# Patient Record
Sex: MALE | Race: WHITE | NOT HISPANIC OR LATINO | URBAN - METROPOLITAN AREA
[De-identification: names, ages, dates, MRNs, and addresses within clinical notes are randomized per-mention and may not be internally consistent; named-entity substitution may affect disease eponyms.]

---

## 2018-07-06 ENCOUNTER — HOSPITAL ENCOUNTER (OUTPATIENT)
Dept: NON INVASIVE DIAGNOSTICS | Facility: HOSPITAL | Age: 83
Discharge: HOME | End: 2018-07-06
Attending: INTERNAL MEDICINE | Admitting: INTERNAL MEDICINE
Payer: MEDICARE

## 2018-07-06 VITALS
RESPIRATION RATE: 18 BRPM | HEART RATE: 61 BPM | DIASTOLIC BLOOD PRESSURE: 64 MMHG | WEIGHT: 270 LBS | SYSTOLIC BLOOD PRESSURE: 137 MMHG | HEIGHT: 72 IN | BODY MASS INDEX: 36.57 KG/M2 | TEMPERATURE: 98.4 F | OXYGEN SATURATION: 95 %

## 2018-07-06 DIAGNOSIS — I42.9 CARDIOMYOPATHY (HCC): ICD-10-CM

## 2018-07-06 PROCEDURE — 33284 HB REMOVE PAT-ACTIVE HT RECORD: CPT | Performed by: INTERNAL MEDICINE

## 2018-07-06 PROCEDURE — 33284 PR RMVL IMPLANTABLE PT-ACTIVATED CAR EVENT RECORDER: CPT | Performed by: INTERNAL MEDICINE

## 2018-07-06 RX ORDER — LIDOCAINE HYDROCHLORIDE 10 MG/ML
INJECTION, SOLUTION INFILTRATION; PERINEURAL CODE/TRAUMA/SEDATION MEDICATION
Status: COMPLETED | OUTPATIENT
Start: 2018-07-06 | End: 2018-07-06

## 2018-07-06 RX ORDER — ASPIRIN AND DIPYRIDAMOLE 25; 200 MG/1; MG/1
1 CAPSULE, EXTENDED RELEASE ORAL 2 TIMES DAILY
COMMUNITY
Start: 2017-12-19

## 2018-07-06 RX ORDER — RAMIPRIL 10 MG/1
5 CAPSULE ORAL DAILY
COMMUNITY
Start: 2011-03-21

## 2018-07-06 RX ORDER — HYDROCHLOROTHIAZIDE 12.5 MG/1
CAPSULE, GELATIN COATED ORAL DAILY
COMMUNITY
Start: 2011-06-01

## 2018-07-06 RX ORDER — SIMVASTATIN 40 MG
40 TABLET ORAL
COMMUNITY
Start: 2011-04-19

## 2018-07-06 RX ORDER — METOPROLOL SUCCINATE 25 MG/1
25 TABLET, EXTENDED RELEASE ORAL
COMMUNITY
Start: 2011-01-24

## 2018-07-06 RX ADMIN — LIDOCAINE HYDROCHLORIDE 10 ML: 10 INJECTION, SOLUTION INFILTRATION; PERINEURAL at 13:21

## 2018-07-06 NOTE — H&P
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% per echo 8/2017  4  Hypertension  5  Hyperlipidemia  6  ZOFIA    Plan:  1  Loop recorder explantation      History of Present Illness   HPI:  Ehsan Mccord is a 80y o  year old male with a history of CVA with loop recorder implantation 2015, CAD with prior MI and LAD stent, ICM with LVEF 40%, HTN, and HLD  He typically follows with Dr Casey Barnett as an outpatient, and presents today for loop recorder explantation  Per reports, his device reached RRT 4/2018  Review of Systems  ROS as noted above, otherwise 12 point review of systems was performed and is negative  Historical Information   No past medical history on file  No past surgical history on file  Family History: No family history on file  Social History   History   Alcohol use Not on file     History   Drug use: Unknown     History   Smoking Status    Not on file   Smokeless Tobacco    Not on file         Meds/Allergies   all medications and allergies reviewed  Home Medications:   Prescriptions Prior to Admission   Medication    aspirin-dipyridamole (AGGRENOX)  mg per 12 hr capsule    folic acid-pyridoxine-cyanocobalamin (FOLBIC) 2 5-25-2 mg    hydrochlorothiazide (MICROZIDE) 12 5 mg capsule    metoprolol succinate (TOPROL-XL) 25 mg 24 hr tablet    ramipril (ALTACE) 10 MG capsule    sertraline (ZOLOFT) 50 mg tablet    simvastatin (ZOCOR) 40 mg tablet       No Known Allergies    Objective   Vitals: Blood pressure 158/66, pulse 68, temperature 97 5 °F (36 4 °C), temperature source Oral, resp  rate 18, height 6' (1 829 m), weight 122 kg (270 lb), SpO2 94 %    Orthostatic Blood Pressures      Most Recent Value   Blood Pressure  158/66 filed at 07/06/2018 1202   Patient Position - Orthostatic VS  Sitting filed at 07/06/2018 1202          No intake or output data in the 24 hours ending 07/06/18 1318    Invasive Devices          No matching active lines, drains, or airways          Physical Exam  GEN: NAD, alert and oriented, well appearing  SKIN: dry without significant lesions or rashes  HEENT: NCAT, PERRL, EOMs intact  NECK: No JVD appreciated  CARDIOVASCULAR: RRR with occasional ectopy, normal S1, S2 without rubs or gallops appreciated; soft MICAH  LUNGS: Clear to auscultation bilaterally without wheezes, rhonchi, or rales  ABDOMEN: Soft, nontender, nondistended  EXTREMITIES/VASCULAR: perfused without clubbing, cyanosis, or edema b/l  PSYCH: Normal mood and affect  NEURO: CN ll-Xll grossly intact      Lab Results: I have personally reviewed pertinent lab results  None noted              Imaging: I have personally reviewed pertinent reports  No results found for this or any previous visit      EKG/telemetry: NSR with PVCs        Code Status: No Order

## 2018-07-06 NOTE — DISCHARGE INSTRUCTIONS
Remove outermost pressure dressing tomorrow, 7/7/2018  Keep loop recorder incision dry for one week  Do not use lotions/powders/creams on incision  Remove remaining bandage 48 hours after procedure - if present, leave underlying steri-strips in place, they will either fall off on their own or will be removed at 2 week follow up appointment  Please call the office if you notice redness, swelling, bleeding, or drainage from incision or if you develop fevers

## 2018-10-01 ENCOUNTER — APPOINTMENT (OUTPATIENT)
Dept: LAB | Facility: HOSPITAL | Age: 83
End: 2018-10-01
Payer: MEDICARE

## 2018-10-01 ENCOUNTER — TRANSCRIBE ORDERS (OUTPATIENT)
Dept: ADMINISTRATIVE | Facility: HOSPITAL | Age: 83
End: 2018-10-01

## 2018-10-01 DIAGNOSIS — E66.9 OBESITY, UNSPECIFIED CLASSIFICATION, UNSPECIFIED OBESITY TYPE, UNSPECIFIED WHETHER SERIOUS COMORBIDITY PRESENT: ICD-10-CM

## 2018-10-01 DIAGNOSIS — E78.5 HYPERLIPIDEMIA, UNSPECIFIED HYPERLIPIDEMIA TYPE: ICD-10-CM

## 2018-10-01 DIAGNOSIS — I25.10 CAD IN NATIVE ARTERY: ICD-10-CM

## 2018-10-01 DIAGNOSIS — I10 ESSENTIAL HYPERTENSION, MALIGNANT: ICD-10-CM

## 2018-10-01 DIAGNOSIS — I25.10 CAD IN NATIVE ARTERY: Primary | ICD-10-CM

## 2018-10-01 LAB
25(OH)D3 SERPL-MCNC: 25.9 NG/ML (ref 30–100)
ALBUMIN SERPL BCP-MCNC: 4.1 G/DL (ref 3.5–5.7)
ALP SERPL-CCNC: 61 U/L (ref 55–165)
ALT SERPL W P-5'-P-CCNC: 15 U/L (ref 7–52)
ANION GAP SERPL CALCULATED.3IONS-SCNC: 8 MMOL/L (ref 4–13)
AST SERPL W P-5'-P-CCNC: 15 U/L (ref 13–39)
BASOPHILS # BLD AUTO: 0 THOUSANDS/ΜL (ref 0–0.1)
BASOPHILS NFR BLD AUTO: 1 % (ref 0–1)
BILIRUB SERPL-MCNC: 0.8 MG/DL (ref 0.2–1)
BNP SERPL-MCNC: 262 PG/ML (ref 1–100)
BUN SERPL-MCNC: 14 MG/DL (ref 7–25)
CALCIUM SERPL-MCNC: 9.5 MG/DL (ref 8.6–10.5)
CHLORIDE SERPL-SCNC: 101 MMOL/L (ref 98–107)
CHOLEST SERPL-MCNC: 139 MG/DL (ref 0–200)
CK SERPL-CCNC: 61 U/L (ref 30–308)
CO2 SERPL-SCNC: 33 MMOL/L (ref 21–31)
CREAT SERPL-MCNC: 1.04 MG/DL (ref 0.7–1.3)
EOSINOPHIL # BLD AUTO: 0.3 THOUSAND/ΜL (ref 0–0.61)
EOSINOPHIL NFR BLD AUTO: 5 % (ref 0–6)
ERYTHROCYTE [DISTWIDTH] IN BLOOD BY AUTOMATED COUNT: 13.7 % (ref 11.6–15.1)
ERYTHROCYTE [SEDIMENTATION RATE] IN BLOOD: 17 MM/HOUR (ref 0–10)
EST. AVERAGE GLUCOSE BLD GHB EST-MCNC: 128 MG/DL
GFR SERPL CREATININE-BSD FRML MDRD: 66 ML/MIN/1.73SQ M
GLUCOSE P FAST SERPL-MCNC: 118 MG/DL (ref 65–99)
HBA1C MFR BLD: 6.1 % (ref 4.2–6.3)
HCT VFR BLD AUTO: 44 % (ref 42–52)
HDLC SERPL-MCNC: 45 MG/DL (ref 40–60)
HGB BLD-MCNC: 14.6 G/DL (ref 12–17)
LDLC SERPL CALC-MCNC: 65 MG/DL (ref 0–100)
LDLC SERPL DIRECT ASSAY-MCNC: 80 MG/DL (ref 75–193)
LYMPHOCYTES # BLD AUTO: 1.5 THOUSANDS/ΜL (ref 0.6–4.47)
LYMPHOCYTES NFR BLD AUTO: 22 % (ref 14–44)
MAGNESIUM SERPL-MCNC: 1.8 MG/DL (ref 1.9–2.7)
MCH RBC QN AUTO: 30.6 PG (ref 26.8–34.3)
MCHC RBC AUTO-ENTMCNC: 33.1 G/DL (ref 31.4–37.4)
MCV RBC AUTO: 93 FL (ref 82–98)
MONOCYTES # BLD AUTO: 0.4 THOUSAND/ΜL (ref 0.17–1.22)
MONOCYTES NFR BLD AUTO: 7 % (ref 4–12)
NEUTROPHILS # BLD AUTO: 4.5 THOUSANDS/ΜL (ref 1.85–7.62)
NEUTS SEG NFR BLD AUTO: 66 % (ref 43–75)
NONHDLC SERPL-MCNC: 94 MG/DL
NRBC BLD AUTO-RTO: 0 /100 WBCS
PLATELET # BLD AUTO: 248 THOUSANDS/UL (ref 149–390)
PMV BLD AUTO: 7.3 FL (ref 8.9–12.7)
POTASSIUM SERPL-SCNC: 4.1 MMOL/L (ref 3.5–5.5)
PROT SERPL-MCNC: 7.3 G/DL (ref 6.4–8.9)
PSA SERPL-MCNC: 1.5 NG/ML (ref 0–4)
RBC # BLD AUTO: 4.76 MILLION/UL (ref 3.88–5.62)
SODIUM SERPL-SCNC: 142 MMOL/L (ref 134–143)
TRIGL SERPL-MCNC: 143 MG/DL (ref 44–166)
TSH SERPL DL<=0.05 MIU/L-ACNC: 0.93 UIU/ML (ref 0.45–5.33)
WBC # BLD AUTO: 6.8 THOUSAND/UL (ref 4.31–10.16)

## 2018-10-01 PROCEDURE — 80061 LIPID PANEL: CPT

## 2018-10-01 PROCEDURE — 82550 ASSAY OF CK (CPK): CPT

## 2018-10-01 PROCEDURE — 36415 COLL VENOUS BLD VENIPUNCTURE: CPT

## 2018-10-01 PROCEDURE — 85652 RBC SED RATE AUTOMATED: CPT

## 2018-10-01 PROCEDURE — 83735 ASSAY OF MAGNESIUM: CPT

## 2018-10-01 PROCEDURE — 84443 ASSAY THYROID STIM HORMONE: CPT

## 2018-10-01 PROCEDURE — 82306 VITAMIN D 25 HYDROXY: CPT

## 2018-10-01 PROCEDURE — 83036 HEMOGLOBIN GLYCOSYLATED A1C: CPT

## 2018-10-01 PROCEDURE — 84153 ASSAY OF PSA TOTAL: CPT

## 2018-10-01 PROCEDURE — 80053 COMPREHEN METABOLIC PANEL: CPT

## 2018-10-01 PROCEDURE — 85025 COMPLETE CBC W/AUTO DIFF WBC: CPT

## 2018-10-01 PROCEDURE — 83880 ASSAY OF NATRIURETIC PEPTIDE: CPT

## 2018-10-01 PROCEDURE — 83721 ASSAY OF BLOOD LIPOPROTEIN: CPT

## 2022-10-13 ENCOUNTER — APPOINTMENT (OUTPATIENT)
Dept: RADIOLOGY | Facility: HOSPITAL | Age: 87
DRG: 291 | End: 2022-10-13
Payer: MEDICARE

## 2022-10-13 ENCOUNTER — HOSPITAL ENCOUNTER (INPATIENT)
Facility: HOSPITAL | Age: 87
LOS: 4 days | Discharge: HOME HEALTH | DRG: 291 | End: 2022-10-17
Attending: EMERGENCY MEDICINE | Admitting: INTERNAL MEDICINE
Payer: MEDICARE

## 2022-10-13 DIAGNOSIS — I50.9 ACUTE EXACERBATION OF CHF (CONGESTIVE HEART FAILURE) (HCC): Primary | ICD-10-CM

## 2022-10-13 DIAGNOSIS — I50.21 ACUTE SYSTOLIC CONGESTIVE HEART FAILURE (HCC): ICD-10-CM

## 2022-10-13 DIAGNOSIS — I48.91 ATRIAL FIBRILLATION WITH RVR (HCC): ICD-10-CM

## 2022-10-13 DIAGNOSIS — I48.91 ATRIAL FIBRILLATION WITH RAPID VENTRICULAR RESPONSE (HCC): ICD-10-CM

## 2022-10-13 PROBLEM — I25.10 CAD (CORONARY ARTERY DISEASE), NATIVE CORONARY ARTERY: Status: ACTIVE | Noted: 2022-10-13

## 2022-10-13 PROBLEM — I10 BENIGN ESSENTIAL HTN: Status: ACTIVE | Noted: 2022-10-13

## 2022-10-13 PROBLEM — J96.11 CHRONIC RESPIRATORY FAILURE WITH HYPOXIA (HCC): Status: ACTIVE | Noted: 2022-10-13

## 2022-10-13 PROBLEM — Z86.73 HISTORY OF CVA (CEREBROVASCULAR ACCIDENT): Status: ACTIVE | Noted: 2022-10-13

## 2022-10-13 PROBLEM — R44.3 HALLUCINATIONS: Status: ACTIVE | Noted: 2022-10-13

## 2022-10-13 LAB
2HR DELTA HS TROPONIN: 3 NG/L
4HR DELTA HS TROPONIN: 0 NG/L
ALBUMIN SERPL BCP-MCNC: 3.7 G/DL (ref 3.5–5)
ALP SERPL-CCNC: 67 U/L (ref 34–104)
ALT SERPL W P-5'-P-CCNC: 136 U/L (ref 7–52)
ANION GAP SERPL CALCULATED.3IONS-SCNC: 11 MMOL/L (ref 4–13)
APTT PPP: 29 SECONDS (ref 23–37)
AST SERPL W P-5'-P-CCNC: 97 U/L (ref 13–39)
ATRIAL RATE: 144 BPM
BACTERIA UR QL AUTO: ABNORMAL /HPF
BASOPHILS # BLD AUTO: 0.04 THOUSANDS/ÂΜL (ref 0–0.1)
BASOPHILS NFR BLD AUTO: 1 % (ref 0–1)
BILIRUB SERPL-MCNC: 1.57 MG/DL (ref 0.2–1)
BILIRUB UR QL STRIP: NEGATIVE
BNP SERPL-MCNC: 1272 PG/ML (ref 0–100)
BUN SERPL-MCNC: 28 MG/DL (ref 5–25)
CALCIUM SERPL-MCNC: 9.3 MG/DL (ref 8.4–10.2)
CARDIAC TROPONIN I PNL SERPL HS: 24 NG/L
CARDIAC TROPONIN I PNL SERPL HS: 24 NG/L
CARDIAC TROPONIN I PNL SERPL HS: 27 NG/L
CHLORIDE SERPL-SCNC: 105 MMOL/L (ref 96–108)
CLARITY UR: CLEAR
CO2 SERPL-SCNC: 28 MMOL/L (ref 21–32)
COLOR UR: YELLOW
CREAT SERPL-MCNC: 1.29 MG/DL (ref 0.6–1.3)
EOSINOPHIL # BLD AUTO: 0.05 THOUSAND/ÂΜL (ref 0–0.61)
EOSINOPHIL NFR BLD AUTO: 1 % (ref 0–6)
ERYTHROCYTE [DISTWIDTH] IN BLOOD BY AUTOMATED COUNT: 14.8 % (ref 11.6–15.1)
FLUAV RNA RESP QL NAA+PROBE: NEGATIVE
FLUBV RNA RESP QL NAA+PROBE: NEGATIVE
GFR SERPL CREATININE-BSD FRML MDRD: 49 ML/MIN/1.73SQ M
GLUCOSE SERPL-MCNC: 137 MG/DL (ref 65–140)
GLUCOSE UR STRIP-MCNC: NEGATIVE MG/DL
HCT VFR BLD AUTO: 48.8 % (ref 36.5–49.3)
HGB BLD-MCNC: 15.2 G/DL (ref 12–17)
HGB UR QL STRIP.AUTO: NEGATIVE
HYALINE CASTS #/AREA URNS LPF: ABNORMAL /LPF
IMM GRANULOCYTES # BLD AUTO: 0.02 THOUSAND/UL (ref 0–0.2)
IMM GRANULOCYTES NFR BLD AUTO: 0 % (ref 0–2)
INR PPP: 1.32 (ref 0.84–1.19)
KETONES UR STRIP-MCNC: NEGATIVE MG/DL
LACTATE SERPL-SCNC: 0.9 MMOL/L (ref 0.5–2)
LACTATE SERPL-SCNC: 2.4 MMOL/L (ref 0.5–2)
LEUKOCYTE ESTERASE UR QL STRIP: ABNORMAL
LYMPHOCYTES # BLD AUTO: 1.06 THOUSANDS/ÂΜL (ref 0.6–4.47)
LYMPHOCYTES NFR BLD AUTO: 12 % (ref 14–44)
MAGNESIUM SERPL-MCNC: 1.8 MG/DL (ref 1.9–2.7)
MCH RBC QN AUTO: 31.1 PG (ref 26.8–34.3)
MCHC RBC AUTO-ENTMCNC: 31.1 G/DL (ref 31.4–37.4)
MCV RBC AUTO: 100 FL (ref 82–98)
MONOCYTES # BLD AUTO: 0.7 THOUSAND/ÂΜL (ref 0.17–1.22)
MONOCYTES NFR BLD AUTO: 8 % (ref 4–12)
MUCOUS THREADS UR QL AUTO: ABNORMAL
NEUTROPHILS # BLD AUTO: 6.91 THOUSANDS/ÂΜL (ref 1.85–7.62)
NEUTS SEG NFR BLD AUTO: 78 % (ref 43–75)
NITRITE UR QL STRIP: NEGATIVE
NON-SQ EPI CELLS URNS QL MICRO: ABNORMAL /HPF
NRBC BLD AUTO-RTO: 0 /100 WBCS
PH UR STRIP.AUTO: 5.5 [PH]
PLATELET # BLD AUTO: 235 THOUSANDS/UL (ref 149–390)
PMV BLD AUTO: 9.9 FL (ref 8.9–12.7)
POTASSIUM SERPL-SCNC: 3.9 MMOL/L (ref 3.5–5.3)
PROCALCITONIN SERPL-MCNC: <0.05 NG/ML
PROT SERPL-MCNC: 6.5 G/DL (ref 6.4–8.4)
PROT UR STRIP-MCNC: ABNORMAL MG/DL
PROTHROMBIN TIME: 16.3 SECONDS (ref 11.6–14.5)
QRS AXIS: -69 DEGREES
QRSD INTERVAL: 150 MS
QT INTERVAL: 396 MS
QTC INTERVAL: 582 MS
RBC # BLD AUTO: 4.89 MILLION/UL (ref 3.88–5.62)
RBC #/AREA URNS AUTO: ABNORMAL /HPF
RSV RNA RESP QL NAA+PROBE: NEGATIVE
SARS-COV-2 RNA RESP QL NAA+PROBE: NEGATIVE
SODIUM SERPL-SCNC: 144 MMOL/L (ref 135–147)
SP GR UR STRIP.AUTO: 1.02 (ref 1–1.03)
T WAVE AXIS: 115 DEGREES
UROBILINOGEN UR QL STRIP.AUTO: 0.2 E.U./DL
VENTRICULAR RATE: 130 BPM
WBC # BLD AUTO: 8.78 THOUSAND/UL (ref 4.31–10.16)
WBC #/AREA URNS AUTO: ABNORMAL /HPF

## 2022-10-13 PROCEDURE — 85610 PROTHROMBIN TIME: CPT | Performed by: EMERGENCY MEDICINE

## 2022-10-13 PROCEDURE — 0241U HB NFCT DS VIR RESP RNA 4 TRGT: CPT | Performed by: EMERGENCY MEDICINE

## 2022-10-13 PROCEDURE — 93010 ELECTROCARDIOGRAM REPORT: CPT | Performed by: INTERNAL MEDICINE

## 2022-10-13 PROCEDURE — 81001 URINALYSIS AUTO W/SCOPE: CPT | Performed by: EMERGENCY MEDICINE

## 2022-10-13 PROCEDURE — 84300 ASSAY OF URINE SODIUM: CPT | Performed by: NURSE PRACTITIONER

## 2022-10-13 PROCEDURE — 83880 ASSAY OF NATRIURETIC PEPTIDE: CPT | Performed by: EMERGENCY MEDICINE

## 2022-10-13 PROCEDURE — 87154 CUL TYP ID BLD PTHGN 6+ TRGT: CPT | Performed by: EMERGENCY MEDICINE

## 2022-10-13 PROCEDURE — 99223 1ST HOSP IP/OBS HIGH 75: CPT | Performed by: INTERNAL MEDICINE

## 2022-10-13 PROCEDURE — 83735 ASSAY OF MAGNESIUM: CPT | Performed by: EMERGENCY MEDICINE

## 2022-10-13 PROCEDURE — 93005 ELECTROCARDIOGRAM TRACING: CPT

## 2022-10-13 PROCEDURE — 83605 ASSAY OF LACTIC ACID: CPT | Performed by: EMERGENCY MEDICINE

## 2022-10-13 PROCEDURE — 85025 COMPLETE CBC W/AUTO DIFF WBC: CPT | Performed by: EMERGENCY MEDICINE

## 2022-10-13 PROCEDURE — 71045 X-RAY EXAM CHEST 1 VIEW: CPT

## 2022-10-13 PROCEDURE — 96374 THER/PROPH/DIAG INJ IV PUSH: CPT

## 2022-10-13 PROCEDURE — 80053 COMPREHEN METABOLIC PANEL: CPT | Performed by: EMERGENCY MEDICINE

## 2022-10-13 PROCEDURE — 82570 ASSAY OF URINE CREATININE: CPT | Performed by: NURSE PRACTITIONER

## 2022-10-13 PROCEDURE — 99285 EMERGENCY DEPT VISIT HI MDM: CPT

## 2022-10-13 PROCEDURE — 84540 ASSAY OF URINE/UREA-N: CPT | Performed by: NURSE PRACTITIONER

## 2022-10-13 PROCEDURE — 96375 TX/PRO/DX INJ NEW DRUG ADDON: CPT

## 2022-10-13 PROCEDURE — 87040 BLOOD CULTURE FOR BACTERIA: CPT | Performed by: EMERGENCY MEDICINE

## 2022-10-13 PROCEDURE — 84484 ASSAY OF TROPONIN QUANT: CPT | Performed by: EMERGENCY MEDICINE

## 2022-10-13 PROCEDURE — 85730 THROMBOPLASTIN TIME PARTIAL: CPT | Performed by: EMERGENCY MEDICINE

## 2022-10-13 PROCEDURE — 36415 COLL VENOUS BLD VENIPUNCTURE: CPT | Performed by: EMERGENCY MEDICINE

## 2022-10-13 PROCEDURE — 84145 PROCALCITONIN (PCT): CPT | Performed by: EMERGENCY MEDICINE

## 2022-10-13 PROCEDURE — 99291 CRITICAL CARE FIRST HOUR: CPT | Performed by: EMERGENCY MEDICINE

## 2022-10-13 RX ORDER — PRAVASTATIN SODIUM 40 MG
80 TABLET ORAL
Status: DISCONTINUED | OUTPATIENT
Start: 2022-10-13 | End: 2022-10-17 | Stop reason: HOSPADM

## 2022-10-13 RX ORDER — DILTIAZEM HYDROCHLORIDE 5 MG/ML
15 INJECTION INTRAVENOUS ONCE
Status: COMPLETED | OUTPATIENT
Start: 2022-10-13 | End: 2022-10-13

## 2022-10-13 RX ORDER — QUETIAPINE FUMARATE 25 MG/1
50 TABLET, FILM COATED ORAL
Status: DISCONTINUED | OUTPATIENT
Start: 2022-10-13 | End: 2022-10-17 | Stop reason: HOSPADM

## 2022-10-13 RX ORDER — ONDANSETRON 2 MG/ML
4 INJECTION INTRAMUSCULAR; INTRAVENOUS EVERY 6 HOURS PRN
Status: DISCONTINUED | OUTPATIENT
Start: 2022-10-13 | End: 2022-10-17 | Stop reason: HOSPADM

## 2022-10-13 RX ORDER — METOPROLOL TARTRATE 5 MG/5ML
5 INJECTION INTRAVENOUS
Status: DISCONTINUED | OUTPATIENT
Start: 2022-10-13 | End: 2022-10-13

## 2022-10-13 RX ORDER — METOPROLOL SUCCINATE 50 MG/1
100 TABLET, EXTENDED RELEASE ORAL DAILY
Status: DISCONTINUED | OUTPATIENT
Start: 2022-10-13 | End: 2022-10-15

## 2022-10-13 RX ORDER — FOLIC ACID 1 MG/1
1 TABLET ORAL DAILY
COMMUNITY

## 2022-10-13 RX ORDER — QUETIAPINE FUMARATE 25 MG/1
50 TABLET, FILM COATED ORAL
COMMUNITY
Start: 2022-08-23

## 2022-10-13 RX ORDER — FUROSEMIDE 10 MG/ML
40 INJECTION INTRAMUSCULAR; INTRAVENOUS ONCE
Status: COMPLETED | OUTPATIENT
Start: 2022-10-13 | End: 2022-10-13

## 2022-10-13 RX ORDER — PANTOPRAZOLE SODIUM 40 MG/1
40 TABLET, DELAYED RELEASE ORAL
Status: DISCONTINUED | OUTPATIENT
Start: 2022-10-14 | End: 2022-10-17 | Stop reason: HOSPADM

## 2022-10-13 RX ORDER — LISINOPRIL 20 MG/1
40 TABLET ORAL DAILY
Status: DISCONTINUED | OUTPATIENT
Start: 2022-10-13 | End: 2022-10-14

## 2022-10-13 RX ORDER — METOPROLOL TARTRATE 5 MG/5ML
5 INJECTION INTRAVENOUS ONCE
Status: COMPLETED | OUTPATIENT
Start: 2022-10-13 | End: 2022-10-13

## 2022-10-13 RX ORDER — FUROSEMIDE 10 MG/ML
40 INJECTION INTRAMUSCULAR; INTRAVENOUS 2 TIMES DAILY
Status: DISCONTINUED | OUTPATIENT
Start: 2022-10-13 | End: 2022-10-15

## 2022-10-13 RX ORDER — PANTOPRAZOLE SODIUM 40 MG/1
40 TABLET, DELAYED RELEASE ORAL DAILY
COMMUNITY

## 2022-10-13 RX ORDER — ACETAMINOPHEN 325 MG/1
650 TABLET ORAL EVERY 4 HOURS PRN
Status: DISCONTINUED | OUTPATIENT
Start: 2022-10-13 | End: 2022-10-17 | Stop reason: HOSPADM

## 2022-10-13 RX ORDER — ASPIRIN AND DIPYRIDAMOLE 25; 200 MG/1; MG/1
1 CAPSULE, EXTENDED RELEASE ORAL 2 TIMES DAILY
Status: DISCONTINUED | OUTPATIENT
Start: 2022-10-13 | End: 2022-10-14

## 2022-10-13 RX ORDER — METOPROLOL TARTRATE 5 MG/5ML
5 INJECTION INTRAVENOUS
Status: DISCONTINUED | OUTPATIENT
Start: 2022-10-13 | End: 2022-10-14

## 2022-10-13 RX ADMIN — LISINOPRIL 40 MG: 20 TABLET ORAL at 14:41

## 2022-10-13 RX ADMIN — PRAVASTATIN SODIUM 80 MG: 40 TABLET ORAL at 16:45

## 2022-10-13 RX ADMIN — ASPIRIN AND EXTENDED-RELEASE DIPYRIDAMOLE 1 CAPSULE: 25; 200 CAPSULE ORAL at 18:13

## 2022-10-13 RX ADMIN — FUROSEMIDE 40 MG: 10 INJECTION, SOLUTION INTRAMUSCULAR; INTRAVENOUS at 12:33

## 2022-10-13 RX ADMIN — METOPROLOL SUCCINATE 100 MG: 50 TABLET, EXTENDED RELEASE ORAL at 14:41

## 2022-10-13 RX ADMIN — FUROSEMIDE 40 MG: 10 INJECTION, SOLUTION INTRAMUSCULAR; INTRAVENOUS at 18:14

## 2022-10-13 RX ADMIN — METOPROLOL TARTRATE 5 MG: 1 INJECTION, SOLUTION INTRAVENOUS at 12:06

## 2022-10-13 RX ADMIN — QUETIAPINE FUMARATE 50 MG: 25 TABLET ORAL at 21:18

## 2022-10-13 RX ADMIN — FOLIC ACID-PYRIDOXINE-CYANOCOBALAMIN TAB 2.5-25-2 MG 1 TABLET: 2.5-25-2 TAB at 16:45

## 2022-10-13 RX ADMIN — APIXABAN 5 MG: 5 TABLET, FILM COATED ORAL at 18:13

## 2022-10-13 RX ADMIN — SERTRALINE HYDROCHLORIDE 100 MG: 50 TABLET ORAL at 14:41

## 2022-10-13 RX ADMIN — METOPROLOL TARTRATE 5 MG: 5 INJECTION INTRAVENOUS at 15:37

## 2022-10-13 RX ADMIN — DILTIAZEM HYDROCHLORIDE 15 MG: 5 INJECTION, SOLUTION INTRAVENOUS at 16:09

## 2022-10-13 NOTE — NURSING NOTE
Pt HR sustaining 120's  EKG shows Afib w/ BBB and left anterior fascicular block  Pt denies chest pain, palpations and SOB  PRN IV 5 mg Lopressor administered as ordered with no effect  [address] DO made aware  One time dose 15 mg IV Cardizem ordered and administered

## 2022-10-13 NOTE — ASSESSMENT & PLAN NOTE
· Patient notes that he is on 3L NC QHS chronically  · This was given to him by his cardiologist several years ago who he has not seen in several years as he retired  · He notes that he was using it more frequently (during the day) for symptoms relief but oxygen saturations remained in the 90s

## 2022-10-13 NOTE — ASSESSMENT & PLAN NOTE
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

## 2022-10-13 NOTE — ASSESSMENT & PLAN NOTE
Wt Readings from Last 3 Encounters:   10/13/22 111 kg (245 lb)   07/06/18 122 kg (270 lb)     · History of anterior MI  · RANJITH (2015): LVEF 60%; no more recent echos available for review   · NT=Pro-BNP: 1272  · CXR (10/13):  Moderate pulmonary edema, moderate right and small left pleural effusions  · Substitute Lisinopril for home Ramipril and continue Toprol as above  · Update echocardiogram as above  · Begin Lasix 40mg IV BID  · Monitor Is and Os and daily weights  · Fluid and Na restricted diet

## 2022-10-13 NOTE — ASSESSMENT & PLAN NOTE
· History of PAF with RANJITH and CVN in 2015  · Atrial fibrillation with HR as high as 132 noted in the ED  · [id number]: 6-7   · Check TSH and continue to trend HsTn  · Monitor on telemetry and check Echocardiogram   · Increase home Toprol to 100mg daily  · Lopressor 5mg IV PRN  · Discussed the risks and benefits of [address] and the patient and his son were in agreement  · Begin Eliquis 5mg BID  · Cardiology consult requested

## 2022-10-13 NOTE — ASSESSMENT & PLAN NOTE
· History of visual hallucinations- being followed by Neurology with [facility] outpatient  · Continue home Seroquel 50mg QHS

## 2022-10-13 NOTE — ED PROVIDER NOTES
History  Chief Complaint   Patient presents with   • Fatigue   • Weakness - Generalized   • Shortness of Breath     Patient is an 77-year-old male with history of CAD, mild ischemic cardiomyopathy, hypertension, who presents for evaluation of generalized weakness, shortness of breath  Patient says the symptoms have been progressing over the past week  The patient says that he wears 3 L of oxygen at home and has not had to increase it  He denies any chest pain, lightheadedness, dizziness, nausea, vomiting, abdominal pain  He denies any leg swelling  Denies any cough, fevers or chills  Prior to Admission Medications   Prescriptions Last Dose Informant Patient Reported? Taking? Cholecalciferol 50 MCG (2000 UT) CAPS   Yes No   Sig: Take 1 capsule by mouth 2 (two) times a day   QUEtiapine (SEROquel) 25 mg tablet   Yes Yes   Sig: Take 50 mg by mouth   aspirin-dipyridamole (AGGRENOX)  mg per 12 hr capsule   Yes No   Sig: Take 1 capsule by mouth 2 (two) times a day   folic acid (FOLVITE) 1 mg tablet   Yes No   Sig: Take 1 mg by mouth daily   folic acid-pyridoxine-cyanocobalamin (FOLBIC) 2 5-25-2 mg   Yes No   Sig: Take 1 tablet by mouth daily   hydrochlorothiazide (MICROZIDE) 12 5 mg capsule   Yes No   Sig: Take by mouth daily   metoprolol succinate (TOPROL-XL) 25 mg 24 hr tablet   Yes No   Sig: Take 25 mg by mouth   pantoprazole (PROTONIX) 40 mg tablet   Yes No   Sig: Take 40 mg by mouth daily   ramipril (ALTACE) 10 MG capsule   Yes No   Sig: Take 5 mg by mouth daily   sertraline (ZOLOFT) 50 mg tablet   Yes No   Sig: Take 100 mg by mouth daily   simvastatin (ZOCOR) 40 mg tablet   Yes No   Sig: Take 40 mg by mouth      Facility-Administered Medications: None       No past medical history on file  No past surgical history on file  No family history on file  I have reviewed and agree with the history as documented      E-Cigarette/Vaping     E-Cigarette/Vaping Substances     Social History Tobacco Use   • Smoking status: Never Smoker   • Smokeless tobacco: Never Used   Substance Use Topics   • Alcohol use: Not Currently   • Drug use: Never       Review of Systems   Constitutional: Negative for chills, fever and unexpected weight change  HENT: Negative for congestion, sore throat and trouble swallowing  Eyes: Negative for pain, discharge and itching  Respiratory: Positive for shortness of breath  Negative for cough, chest tightness and wheezing  Cardiovascular: Negative for chest pain, palpitations and leg swelling  Gastrointestinal: Negative for abdominal pain, blood in stool, diarrhea, nausea and vomiting  Endocrine: Negative for polyuria  Genitourinary: Negative for difficulty urinating, dysuria, frequency and hematuria  Musculoskeletal: Negative for arthralgias and back pain  Neurological: Positive for weakness (generalized)  Negative for dizziness, syncope, light-headedness and headaches  Physical Exam  Physical Exam  Vitals and nursing note reviewed  Constitutional:       General: He is not in acute distress  Appearance: He is well-developed  HENT:      Head: Normocephalic and atraumatic  Right Ear: External ear normal       Left Ear: External ear normal    Eyes:      Conjunctiva/sclera: Conjunctivae normal       Pupils: Pupils are equal, round, and reactive to light  Cardiovascular:      Rate and Rhythm: Tachycardia present  Rhythm irregular  Heart sounds: Normal heart sounds  No murmur heard  No friction rub  No gallop  Pulmonary:      Effort: Pulmonary effort is normal  No respiratory distress  Breath sounds: Normal breath sounds  No wheezing or rales  Abdominal:      General: Bowel sounds are normal  There is no distension  Palpations: Abdomen is soft  Tenderness: There is no abdominal tenderness  There is no guarding  Musculoskeletal:         General: No tenderness or deformity  Normal range of motion        Cervical back: Normal range of motion  Lymphadenopathy:      Cervical: No cervical adenopathy  Skin:     General: Skin is warm and dry  Neurological:      General: No focal deficit present  Mental Status: He is alert and oriented to person, place, and time  Mental status is at baseline  Cranial Nerves: No cranial nerve deficit  Sensory: No sensory deficit  Motor: No weakness or abnormal muscle tone     Psychiatric:         Behavior: Behavior normal          Vital Signs  ED Triage Vitals   Temperature Pulse Respirations Blood Pressure SpO2   10/13/22 1046 10/13/22 1046 10/13/22 1046 10/13/22 1047 10/13/22 1046   98 7 °F (37 1 °C) (!) 132 20 140/65 96 %      Temp Source Heart Rate Source Patient Position - Orthostatic VS BP Location FiO2 (%)   10/13/22 1046 10/13/22 1046 10/13/22 1100 10/13/22 1100 --   Oral Monitor Lying Left arm       Pain Score       10/13/22 1046       No Pain           Vitals:    10/13/22 1047 10/13/22 1100 10/13/22 1208 10/13/22 1230   BP: 140/65 130/75 123/97 (!) 133/115   Pulse:  (!) 122 (!) 127 (!) 118   Patient Position - Orthostatic VS:  Lying Lying Lying         Visual Acuity      ED Medications  Medications   metoprolol (LOPRESSOR) injection 5 mg (5 mg Intravenous Given 10/13/22 1206)   furosemide (LASIX) injection 40 mg (40 mg Intravenous Given 10/13/22 1233)       Diagnostic Studies  Results Reviewed     Procedure Component Value Units Date/Time    Lactic acid [[id number]] Updated: 10/13/22 1236    Lab Status: No result Specimen: Blood from Arm, Right     Magnesium [[id number]]     Lab Status: No result Specimen: Blood     FLU/RSV/COVID - if FLU/RSV clinically relevant [[id number]]  (Normal) Collected: 10/13/22 1133    Lab Status: Final result Specimen: Nares from Nasopharyngeal Swab Updated: 10/13/22 1221     SARS-CoV-2 Negative     INFLUENZA A PCR Negative     INFLUENZA B PCR Negative     RSV PCR Negative    Narrative:      FOR PEDIATRIC PATIENTS - copy/paste COVID Guidelines URL to browser: [email or web address]    SARS-CoV-2 assay is a Nucleic Acid Amplification assay intended for the  qualitative detection of nucleic acid from SARS-CoV-2 in nasopharyngeal  swabs  Results are for the presumptive identification of SARS-CoV-2 RNA  Positive results are indicative of infection with SARS-CoV-2, the virus  causing COVID-19, but do not rule out bacterial infection or co-infection  with other viruses  Laboratories within the [place] and its  territories are required to report all positive results to the appropriate  public health authorities  Negative results do not preclude SARS-CoV-2  infection and should not be used as the sole basis for treatment or other  patient management decisions  Negative results must be combined with  clinical observations, patient history, and epidemiological information  This test has not been FDA cleared or approved  This test has been authorized by FDA under an Emergency Use Authorization  (EUA)  This test is only authorized for the duration of time the  declaration that circumstances exist justifying the authorization of the  emergency use of an in vitro diagnostic tests for detection of SARS-CoV-2  virus and/or diagnosis of COVID-19 infection under section 564(b)(1) of  the Act, 21 U  S C  113CXC-2(I)(7), unless the authorization is terminated  or revoked sooner  The test has been validated but independent review by FDA  and CLIA is pending  Test performed using [employer] Gene[employer]pert: This RT-PCR assay targets N2,  a region unique to SARS-CoV-2  A conserved region in the E-gene was chosen  for pan-Sarbecovirus detection which includes SARS-CoV-2  According to CMS-2020-01-R, this platform meets the definition of high-throughput technology      Protime-INR [[id number]]  (Abnormal) Collected: 10/13/22 1133    Lab Status: Final result Specimen: Blood from Arm, Right Updated: 10/13/22 1212 Protime 16 3 seconds      INR 1 32    APTT [[id number]]  (Normal) Collected: 10/13/22 1133    Lab Status: Final result Specimen: Blood from Arm, Right Updated: 10/13/22 1212     PTT 29 seconds     HS Troponin I 2hr [[id number]]     Lab Status: No result Specimen: Blood     HS Troponin I 4hr [[id number]]     Lab Status: No result Specimen: Blood     HS Troponin 0hr (reflex protocol) [[id number]]  (Normal) Collected: 10/13/22 1133    Lab Status: Final result Specimen: Blood from Arm, Right Updated: 10/13/22 1208     hs TnI 0hr 24 ng/L     B-Type Natriuretic Peptide(BNP) AN, CA, EA Camp[facility] Only [[id number]]  (Abnormal) Collected: 10/13/22 1133    Lab Status: Final result Specimen: Blood from Arm, Right Updated: 10/13/22 1208     BNP 1,272 pg/mL     Comprehensive metabolic panel [[id number]]  (Abnormal) Collected: 10/13/22 1133    Lab Status: Final result Specimen: Blood from Arm, Right Updated: 10/13/22 1202     Sodium 144 mmol/L      Potassium 3 9 mmol/L      Chloride 105 mmol/L      CO2 28 mmol/L      ANION GAP 11 mmol/L      BUN 28 mg/dL      Creatinine 1 29 mg/dL      Glucose 137 mg/dL      Calcium 9 3 mg/dL      AST 97 U/L       U/L      Alkaline Phosphatase 67 U/L      Total Protein 6 5 g/dL      Albumin 3 7 g/dL      Total Bilirubin 1 57 mg/dL      eGFR 49 ml/min/1 73sq m     Narrative:      [place] guidelines for Chronic Kidney Disease (CKD):   •  Stage 1 with normal or high GFR (GFR > 90 mL/min/1 73 square meters)  •  Stage 2 Mild CKD (GFR = 60-89 mL/min/1 73 square meters)  •  Stage 3A Moderate CKD (GFR = 45-59 mL/min/1 73 square meters)  •  Stage 3B Moderate CKD (GFR = 30-44 mL/min/1 73 square meters)  •  Stage 4 Severe CKD (GFR = 15-29 mL/min/1 73 square meters)  •  Stage 5 End Stage CKD (GFR <15 mL/min/1 73 square meters)  Note: GFR calculation is accurate only with a steady state creatinine    Blood culture #2 [[id number]] Collected: 10/13/22 1144    Lab Status:  In process Specimen: Blood from Arm, Right Updated: 10/13/22 1147    CBC and differential [[id number]]  (Abnormal) Collected: 10/13/22 1133    Lab Status: Final result Specimen: Blood from Arm, Right Updated: 10/13/22 1146     WBC 8 78 Thousand/uL      RBC 4 89 Million/uL      Hemoglobin 15 2 g/dL      Hematocrit 48 8 %       fL      MCH 31 1 pg      MCHC 31 1 g/dL      RDW 14 8 %      MPV 9 9 fL      Platelets 750 Thousands/uL      nRBC 0 /100 WBCs      Neutrophils Relative 78 %      Immat GRANS % 0 %      Lymphocytes Relative 12 %      Monocytes Relative 8 %      Eosinophils Relative 1 %      Basophils Relative 1 %      Neutrophils Absolute 6 91 Thousands/µL      Immature Grans Absolute 0 02 Thousand/uL      Lymphocytes Absolute 1 06 Thousands/µL      Monocytes Absolute 0 70 Thousand/µL      Eosinophils Absolute 0 05 Thousand/µL      Basophils Absolute 0 04 Thousands/µL     Procalcitonin [[id number]] Collected: 10/13/22 1133    Lab Status: In process Specimen: Blood from Arm, Right Updated: 10/13/22 1138    Blood culture #1 [[id number]] Collected: 10/13/22 1133    Lab Status: In process Specimen: Blood from Arm, Right Updated: 10/13/22 1138    UA (URINE) with reflex to Scope [[id number]]     Lab Status: No result Specimen: Urine                  XR chest 1 view portable   Final Result by Giselle Baugh MD (10/13 1141)      Moderate pulmonary edema with moderate right and small left effusion                    Workstation performed: [id number]                    Procedures  ECG 12 Lead Documentation Only    Date/Time: 10/13/2022 11:21 AM  Performed by: Ashley Pettit DO  Authorized by: Ashley Pettit DO     Indications / Diagnosis:  Sob, weakness  ECG reviewed by me, the ED Provider: yes    Patient location:  ED  Previous ECG:     Previous ECG:  Compared to current    Comparison ECG info:  A fib, RBBB    Similarity:  Changes noted    Comparison to cardiac monitor: Yes    Interpretation:     Interpretation: abnormal Rate:     ECG rate:  130    ECG rate assessment: tachycardic    Rhythm:     Rhythm: atrial fibrillation    Ectopy:     Ectopy: none    QRS:     QRS axis:  Normal  Conduction:     Conduction: normal    ST segments:     ST segments:  Normal  T waves:     T waves: normal      CriticalCare Time  Performed by: Brendan Ahumada DO  Authorized by: Brendan Ahumada DO     Critical care provider statement:     Critical care time (minutes):  35    Critical care time was exclusive of:  Separately billable procedures and treating other patients and teaching time    Critical care was necessary to treat or prevent imminent or life-threatening deterioration of the following conditions:  Cardiac failure    Critical care was time spent personally by me on the following activities:  Blood draw for specimens, obtaining history from patient or surrogate, discussions with consultants, development of treatment plan with patient or surrogate, evaluation of patient's response to treatment, examination of patient, review of old charts, re-evaluation of patient's condition, ordering and review of laboratory studies, ordering and performing treatments and interventions, interpretation of cardiac output measurements and ordering and review of radiographic studies    I assumed direction of critical care for this patient from another provider in my specialty: no               ED Course                               SBIRT 22yo+    Flowsheet Row Most Recent Value   SBIRT (25 yo +)    In order to provide better care to our patients, we are screening all of our patients for alcohol and drug use  Would it be okay to ask you these screening questions? Yes Filed at: 10/13/2022 1058   Initial Alcohol Screen: US AUDIT-C     1  How often do you have a drink containing alcohol? 1 Filed at: 10/13/2022 1058   2  How many drinks containing alcohol do you have on a typical day you are drinking? 0 Filed at: 10/13/2022 1058   3a  Male UNDER 65:  How often do you have five or more drinks on one occasion? 0 Filed at: 10/13/2022 1058   3b  FEMALE Any Age, or MALE 65+: How often do you have 4 or more drinks on one occassion? 0 Filed at: 10/13/2022 1058   Audit-C Score 1 Filed at: 10/13/2022 1058   JUAN: How many times in the past year have you    Used an illegal drug or used a prescription medication for non-medical reasons? Never Filed at: 10/13/2022 1058                    MDM  Number of Diagnoses or Management Options  Acute exacerbation of CHF (congestive heart failure) (HCC)  Atrial fibrillation with rapid ventricular response ([facility] )  Diagnosis management comments: 80-year-old male presenting for generalized weakness, shortness of breath  Progressing over the past week  Has been requiring supplemental oxygen at home, usually only uses it at night  No chest pain  No cough, fevers, chills, abdominal pain  Patient in AFib with RVR on arrival   No history of AFib  Not on Lasix at home  Believe symptoms are likely secondary to CHF exacerbation  Will obtain cardiac workup, BMP, chest x-ray  Chest x-ray shows pulmonary edema, BMP elevated at 1700  EKG no ischemic changes    Patient admitted to step-down level to for AFib with RVR, CHF exacerbation      Disposition  Final diagnoses:   Acute exacerbation of CHF (congestive heart failure) ([facility] )   Atrial fibrillation with rapid ventricular response ([facility] )     Time reflects when diagnosis was documented in both MDM as applicable and the Disposition within this note     Time User Action Codes Description Comment    10/13/2022 12:43 PM Yaritza Vela [I50 9] Acute exacerbation of CHF (congestive heart failure) ([facility] )     10/13/2022 12:44 PM Vipul Smith [I48 91] Atrial fibrillation with rapid ventricular response [facility])       ED Disposition     ED Disposition   Admit    Condition   Stable    Date/Time   u Oct 13, 2022 12:43 PM    Comment   Case was discussed with DIXON and the patient's admission status was agreed to be Admission Status: inpatient status to the service of Dr Morris Nipple   Follow-up Information    None         Patient's Medications   Discharge Prescriptions    No medications on file       No discharge procedures on file      PDMP Review     None          ED Provider  Electronically Signed by           Luis Antonio Gar DO  10/13/22 3530

## 2022-10-13 NOTE — PLAN OF CARE
Problem: MOBILITY - ADULT  Goal: Maintain or return to baseline ADL function  Description: INTERVENTIONS:  -  Assess patient's ability to carry out ADLs; assess patient's baseline for ADL function and identify physical deficits which impact ability to perform ADLs (bathing, care of mouth/teeth, toileting, grooming, dressing, etc )  - Assess/evaluate cause of self-care deficits   - Assess range of motion  - Assess patient's mobility; develop plan if impaired  - Assess patient's need for assistive devices and provide as appropriate  - Encourage maximum independence but intervene and supervise when necessary  - Involve family in performance of ADLs  - Assess for home care needs following discharge   - Consider OT consult to assist with ADL evaluation and planning for discharge  - Provide patient education as appropriate  Outcome: Progressing  Goal: Maintains/Returns to pre admission functional level  Description: INTERVENTIONS:  - Perform BMAT or MOVE assessment daily    - Set and communicate daily mobility goal to care team and patient/family/caregiver     - Collaborate with rehabilitation services on mobility goals if consulted  - Perform Range of Motion 2  Problem: PAIN - ADULT  Goal: Verbalizes/displays adequate comfort level or baseline comfort level  Description: Interventions:  - Encourage patient to monitor pain and request assistance  - Assess pain using appropriate pain scale  - Administer analgesics based on type and severity of pain and evaluate response  - Implement non-pharmacological measures as appropriate and evaluate response  - Consider cultural and social influences on pain and pain management  - Notify physician/advanced practitioner if interventions unsuccessful or patient reports new pain  Outcome: Progressing     Problem: INFECTION - ADULT  Goal: Absence or prevention of progression during hospitalization  Description: INTERVENTIONS:  - Assess and monitor for signs and symptoms of infection  - Monitor lab/diagnostic results  - Monitor all insertion sites, i e  indwelling lines, tubes, and drains  - Monitor endotracheal if appropriate and nasal secretions for changes in amount and color  - [place] appropriate cooling/warming therapies per order  - Administer medications as ordered  - Instruct and encourage patient and family to use good hand hygiene technique  - Identify and instruct in appropriate isolation precautions for identified infection/condition  Outcome: Progressing  Goal: Absence of fever/infection during neutropenic period  Description: INTERVENTIONS:  - Monitor WBC    Outcome: Progressing     Problem: SAFETY ADULT  Goal: Maintain or return to baseline ADL function  Description: INTERVENTIONS:  -  Assess patient's ability to carry out ADLs; assess patient's baseline for ADL function and identify physical deficits which impact ability to perform ADLs (bathing, care of mouth/teeth, toileting, grooming, dressing, etc )  - Assess/evaluate cause of self-care deficits   - Assess range of motion  - Assess patient's mobility; develop plan if impaired  - Assess patient's need for assistive devices and provide as appropriate  - Encourage maximum independence but intervene and supervise when necessary  - Involve family in performance of ADLs  - Assess for home care needs following discharge   - Consider OT consult to assist with ADL evaluation and planning for discharge  - Provide patient education as appropriate  Outcome: Progressing  Goal: Maintains/Returns to pre admission functional level  Description: INTERVENTIONS:  - Perform BMAT or MOVE assessment daily    - Set and communicate daily mobility goal to care team and patient/family/caregiver     - Collaborate with rehabilitation services on mobility goals if consulted  - Perform Range of Motion 2  Problem: DISCHARGE PLANNING  Goal: Discharge to home or other facility with appropriate resources  Description: INTERVENTIONS:  - Identify barriers to discharge w/patient and caregiver  - Arrange for needed discharge resources and transportation as appropriate  - Identify discharge learning needs (meds, wound care, etc )  - Arrange for interpretive services to assist at discharge as needed  - Refer to Case Management Department for coordinating discharge planning if the patient needs post-hospital services based on physician/advanced practitioner order or complex needs related to functional status, cognitive ability, or social support system  Outcome: Progressing     Problem: Knowledge Deficit  Goal: Patient/family/caregiver demonstrates understanding of disease process, treatment plan, medications, and discharge instructions  Description: Complete learning assessment and assess knowledge base  Interventions:  - Provide teaching at level of understanding  - Provide teaching via preferred learning methods  Outcome: Progressing    times a day  - Reposition patient every 2 hours    - Dangle patient 3 times a day  - Stand patient 3 times a day  - Ambulate patient 3 times a day  - Out of bed to chair 3 times a day   - Out of bed for meals 3 times a day  - Out of bed for toileting  - Record patient progress and toleration of activity level   Outcome: Progressing  Goal: Patient will remain free of falls  Description: INTERVENTIONS:  - Educate patient/family on patient safety including physical limitations  - Instruct patient to call for assistance with activity   - Consult OT/PT to assist with strengthening/mobility   - Keep Call bell within reach  - Keep bed low and locked with side rails adjusted as appropriate  - Keep care items and personal belongings within reach  - Initiate and maintain comfort rounds  - Make Fall Risk Sign visible to staff  - Offer Toileting every 2 Hours, in advance of need  - Initiate/Maintain bed alarm  - Obtain necessary fall risk management equipment:   - Apply yellow socks and bracelet for high fall risk patients  - Consider moving patient to room near nurses station  Outcome: Progressing    times a day  - Reposition patient every 2 hours    - Dangle patient 3 times a day  - Stand patient 3 times a day  - Ambulate patient 3 times a day  - Out of bed to chair 3 times a day   - Out of bed for meals 3 times a day  - Out of bed for toileting  - Record patient progress and toleration of activity level   Outcome: Progressing

## 2022-10-13 NOTE — H&P
[address]  H&P- Maddie Gates 1935, 80 y o  male MRN: [id number]  Unit/Bed#: ED 24 Encounter: [id number]  Primary Care Provider: Werner Horn DO   Date and time admitted to hospital: 10/13/2022 10:51 AM    * Atrial fibrillation with RVR ([facility] [address] )  Assessment & Plan  · History of PAF with RANJITH and CVN in 2015  · Was previously on Warfarin but this was discontinued several years ago  · Atrial fibrillation with HR as high as 132 noted in the ED  · [id number]: 6-7   · Check TSH and continue to trend HsTn  · Monitor on telemetry and check Echocardiogram   · Increase home Toprol to 100mg daily  · Lopressor 5mg IV PRN  · Discussed the risks and benefits of [facility] and the patient and his son were in agreement  · Begin Eliquis 5mg BID  · Cardiology consult requested    Acute congestive heart failure ([facility] )  Assessment & Plan  Wt Readings from Last 3 Encounters:   10/13/22 111 kg (245 lb)   07/06/18 122 kg (270 lb)     · History of anterior MI  · RANJITH (2015): LVEF 60%; no more recent echos available for review   · NT-Pro-BNP: 1272  · CXR (10/13):  Moderate pulmonary edema, moderate right and small left pleural effusions  · Substitute Lisinopril for home Ramipril and continue Toprol as above  · Update echocardiogram as above  · Begin Lasix 40mg IV BID  · Monitor Is and Os and daily weights  · Fluid and Na restricted diet    Chronic respiratory failure with hypoxia (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  · Continue Aggrenox and Pravastatin as above    Benign essential HTN  Assessment & Plan  · Will home hold HCTZ as initiating Lasix  · Continue Lisinopril and Toprol as above     Hallucinations  Assessment & Plan  · History of visual hallucinations- being followed by Neurology with [facility] outpatient  · Continue home Seroquel 50mg QHS      VTE Pharmacologic Prophylaxis: VTE Score: 4 Moderate Risk (Score 3-4) - Pharmacological DVT Prophylaxis Ordered: apixaban (Eliquis)  Code Status: Level 1 - Full Code   Discussion with family: Updated  (son) via phone  Anticipated Length of Stay: Patient will be admitted on an inpatient basis with an anticipated length of stay of greater than 2 midnights secondary to atriall fibrillation with RVR and acute CHF  Total Time for Visit, including Counseling / Coordination of Care: 70 minutes Greater than 50% of this total time spent on direct patient counseling and coordination of care  Chief Complaint: Fatigue    History of Present Illness:  Faythe Hatchet is a 80 y o  male with a PMH of L MCA CVA, CAD, and PAF s/p RANJITH and CVN who presents for evaluation of fatigue  The patient and his son not persistent fatigue that has been progressively worsening over the last week  The patient has a history of visual hallucinations and is on Seroquel/ following with Neurology for this; as his Seroquel was recently increased to 75mg QHS, it was believed that his symptoms were related to this  Unfortunately, despite dose reduction, the patient had no relief of his symptoms  Aside from fatigue, the patient reported shortness of breath  He uses 3L NC chronically at bedtime but has been using this throughout the day for symptom relief  Though he had the sensation of shortness of breath, his oxygen saturations have remained in the 90s at home   He denies any associated chest pain or lower extremity swelling  His symptoms were constant and he did not associate exacerbation with lying flat or ambulation  In the ED, he was found to be in rapid atrial fibrillation with a HR in the 130s  Additionally, he was noted to have elevated BNP with associated pulmonary edema and pleural effusions on CXR  He was given 1 dose of IV Lopressor and Lasix and ultimately admitted to the medical floor for further observation and management  Review of Systems:  Review of Systems   Constitutional: Positive for fatigue  Negative for chills and fever  HENT: Negative for ear pain, sinus pressure and sore throat  Eyes: Negative for pain and visual disturbance  Respiratory: Positive for shortness of breath  Negative for cough and wheezing  Cardiovascular: Negative for chest pain, palpitations and leg swelling  Gastrointestinal: Negative for abdominal pain, constipation, diarrhea, nausea and vomiting  Genitourinary: Negative for dysuria and hematuria  Musculoskeletal: Negative for arthralgias, back pain and myalgias  Skin: Negative for color change and rash  Neurological: Negative for dizziness, seizures and syncope  Psychiatric/Behavioral: Positive for hallucinations  Negative for agitation and confusion  All other systems reviewed and are negative  Past Medical and Surgical History:   No past medical history on file  No past surgical history on file  Meds/Allergies:  Prior to Admission medications    Medication Sig Start Date End Date Taking?  Authorizing Provider   QUEtiapine (SEROquel) 25 mg tablet Take 50 mg by mouth 8/23/22  Yes Historical Provider, MD   aspirin-dipyridamole (AGGRENOX)  mg per 12 hr capsule Take 1 capsule by mouth 2 (two) times a day 12/19/17   Historical Provider, MD   Cholecalciferol 50 MCG (2000 UT) CAPS Take 1 capsule by mouth 2 (two) times a day    Historical Provider, MD   folic acid (FOLVITE) 1 mg tablet Take 1 mg by mouth daily    Historical Provider, MD   folic acid-pyridoxine-cyanocobalamin (FOLBIC) 2 5-25-2 mg Take 1 tablet by mouth daily 8/29/12   Historical Provider, MD   hydrochlorothiazide (MICROZIDE) 12 5 mg capsule Take by mouth daily 6/1/11   Historical Provider, MD   metoprolol succinate (TOPROL-XL) 25 mg 24 hr tablet Take 25 mg by mouth 1/24/11   Historical Provider, MD   pantoprazole (PROTONIX) 40 mg tablet Take 40 mg by mouth daily    Historical Provider, MD   ramipril (ALTACE) 10 MG capsule Take 5 mg by mouth daily 3/21/11   Historical Provider, MD   sertraline (ZOLOFT) 50 mg tablet Take 100 mg by mouth daily 7/5/11   Historical Provider, MD   simvastatin (ZOCOR) 40 mg tablet Take 40 mg by mouth 4/19/11   Historical Provider, MD     I have reviewed home medications with patient family member  Allergies: No Known Allergies    Social History:  Marital Status: /Civil Union   Patient Pre-hospital Living Situation: Home, With other family member: sons  Patient Pre-hospital Level of Mobility: walks with cane  Patient Pre-hospital Diet Restrictions: None    Substance Use History:   Social History     Substance and Sexual Activity   Alcohol Use Not Currently     Social History     Tobacco Use   Smoking Status Never Smoker   Smokeless Tobacco Never Used     Social History     Substance and Sexual Activity   Drug Use Never       Family History:  No family history on file  Physical Exam:     Vitals:   Blood Pressure: (!) 133/115 (10/13/22 1230)  Pulse: (!) 118 (10/13/22 1230)  Temperature: 98 7 °F (37 1 °C) (10/13/22 1046)  Temp Source: Oral (10/13/22 1046)  Respirations: (!) 25 (10/13/22 1230)  Height: 6' (182 9 cm) (10/13/22 1046)  Weight - Scale: 111 kg (245 lb) (10/13/22 1046)  SpO2: 92 % (10/13/22 1230)    Physical Exam  Vitals and nursing note reviewed  Constitutional:       General: He is not in acute distress  Appearance: Normal appearance  He is obese  HENT:      Head: Normocephalic and atraumatic        Mouth/Throat:      Mouth: Mucous membranes are moist       Pharynx: Oropharynx is clear  Eyes:      Extraocular Movements: Extraocular movements intact  Conjunctiva/sclera: Conjunctivae normal    Cardiovascular:      Rate and Rhythm: Tachycardia present  Rhythm irregular  Pulses: Normal pulses  Heart sounds: Normal heart sounds  Pulmonary:      Effort: Pulmonary effort is normal       Breath sounds: Rhonchi present  No wheezing  Abdominal:      General: Bowel sounds are normal  There is no distension  Palpations: Abdomen is soft  Tenderness: There is no abdominal tenderness  Musculoskeletal:         General: Normal range of motion  Cervical back: Normal range of motion and neck supple  Right lower leg: No edema  Left lower leg: No edema  Skin:     General: Skin is warm and dry  Neurological:      General: No focal deficit present  Mental Status: He is alert and oriented to person, place, and time  Mental status is at baseline     Psychiatric:         Mood and Affect: Mood normal          Behavior: Behavior normal          Judgment: Judgment normal         Lab Results:  Results from last 7 days   Lab Units 10/13/22  1133   WBC Thousand/uL 8 78   HEMOGLOBIN g/dL 15 2   HEMATOCRIT % 48 8   PLATELETS Thousands/uL 235   NEUTROS PCT % 78*   LYMPHS PCT % 12*   MONOS PCT % 8   EOS PCT % 1     Results from last 7 days   Lab Units 10/13/22  1133   SODIUM mmol/L 144   POTASSIUM mmol/L 3 9   CHLORIDE mmol/L 105   CO2 mmol/L 28   BUN mg/dL 28*   CREATININE mg/dL 1 29   ANION GAP mmol/L 11   CALCIUM mg/dL 9 3   ALBUMIN g/dL 3 7   TOTAL BILIRUBIN mg/dL 1 57*   ALK PHOS U/L 67   ALT U/L 136*   AST U/L 97*   GLUCOSE RANDOM mg/dL 137     Results from last 7 days   Lab Units 10/13/22  1133   INR  1 32*             Results from last 7 days   Lab Units 10/13/22  1133   PROCALCITONIN ng/ml <0 05       Imaging: Reviewed radiology reports from this admission including: chest xray  XR chest 1 view portable Final Result by Lacie Landrum MD (10/13 1141)      Moderate pulmonary edema with moderate right and small left effusion  Workstation performed: [id number]             EKG and Other Studies Reviewed on Admission:   · EKG: Atrial fibrillation    ** Please Note: This note has been constructed using a voice recognition system   **

## 2022-10-14 ENCOUNTER — APPOINTMENT (INPATIENT)
Dept: NON INVASIVE DIAGNOSTICS | Facility: HOSPITAL | Age: 87
DRG: 291 | End: 2022-10-14
Payer: MEDICARE

## 2022-10-14 PROBLEM — N17.9 AKI (ACUTE KIDNEY INJURY) (HCC): Status: ACTIVE | Noted: 2022-10-14

## 2022-10-14 PROBLEM — I50.21 ACUTE SYSTOLIC CONGESTIVE HEART FAILURE (HCC): Status: ACTIVE | Noted: 2022-10-13

## 2022-10-14 LAB
ANION GAP SERPL CALCULATED.3IONS-SCNC: 13 MMOL/L (ref 4–13)
AORTIC ROOT: 3.6 CM
AORTIC VALVE MEAN VELOCITY: 10.7 M/S
APICAL FOUR CHAMBER EJECTION FRACTION: 12 %
ASCENDING AORTA: 3.4 CM
ATRIAL RATE: 105 BPM
AV AREA BY CONTINUOUS VTI: 1.9 CM2
AV AREA PEAK VELOCITY: 1.8 CM2
AV LVOT MEAN GRADIENT: 1 MMHG
AV LVOT PEAK GRADIENT: 4 MMHG
AV MEAN GRADIENT: 6 MMHG
AV PEAK GRADIENT: 11 MMHG
AV VALVE AREA: 1.94 CM2
AV VELOCITY RATIO: 0.56
BUN SERPL-MCNC: 33 MG/DL (ref 5–25)
CALCIUM SERPL-MCNC: 9.1 MG/DL (ref 8.4–10.2)
CHLORIDE SERPL-SCNC: 105 MMOL/L (ref 96–108)
CO2 SERPL-SCNC: 26 MMOL/L (ref 21–32)
CREAT SERPL-MCNC: 1.6 MG/DL (ref 0.6–1.3)
CREAT UR-MCNC: 64.2 MG/DL
DOP CALC AO PEAK VEL: 1.69 M/S
DOP CALC AO VTI: 24.76 CM
DOP CALC LVOT AREA: 3.14 CM2
DOP CALC LVOT DIAMETER: 2 CM
DOP CALC LVOT PEAK VEL VTI: 15.26 CM
DOP CALC LVOT PEAK VEL: 0.95 M/S
DOP CALC LVOT STROKE INDEX: 20.1 ML/M2
DOP CALC LVOT STROKE VOLUME: 47.92
ERYTHROCYTE [DISTWIDTH] IN BLOOD BY AUTOMATED COUNT: 14.8 % (ref 11.6–15.1)
FRACTIONAL SHORTENING: 3 (ref 28–44)
GFR SERPL CREATININE-BSD FRML MDRD: 38 ML/MIN/1.73SQ M
GLUCOSE SERPL-MCNC: 105 MG/DL (ref 65–140)
HCT VFR BLD AUTO: 48.2 % (ref 36.5–49.3)
HGB BLD-MCNC: 15.1 G/DL (ref 12–17)
INTERVENTRICULAR SEPTUM IN DIASTOLE (PARASTERNAL SHORT AXIS VIEW): 1.1 CM
INTERVENTRICULAR SEPTUM: 1.1 CM (ref 0.6–1.1)
LAAS-AP2: 30.9 CM2
LAAS-AP4: 38.2 CM2
LEFT ATRIUM SIZE: 4.5 CM
LEFT INTERNAL DIMENSION IN SYSTOLE: 6.1 CM (ref 2.1–4)
LEFT VENTRICULAR INTERNAL DIMENSION IN DIASTOLE: 6.3 CM (ref 3.5–6)
LEFT VENTRICULAR POSTERIOR WALL IN END DIASTOLE: 1.1 CM
LEFT VENTRICULAR STROKE VOLUME: 20 ML
LVSV (TEICH): 20 ML
MAGNESIUM SERPL-MCNC: 1.7 MG/DL (ref 1.9–2.7)
MCH RBC QN AUTO: 31.5 PG (ref 26.8–34.3)
MCHC RBC AUTO-ENTMCNC: 31.3 G/DL (ref 31.4–37.4)
MCV RBC AUTO: 101 FL (ref 82–98)
PLATELET # BLD AUTO: 243 THOUSANDS/UL (ref 149–390)
PMV BLD AUTO: 10.3 FL (ref 8.9–12.7)
POTASSIUM SERPL-SCNC: 4 MMOL/L (ref 3.5–5.3)
QRS AXIS: -36 DEGREES
QRSD INTERVAL: 154 MS
QT INTERVAL: 350 MS
QTC INTERVAL: 498 MS
RBC # BLD AUTO: 4.79 MILLION/UL (ref 3.88–5.62)
RIGHT ATRIUM AREA SYSTOLE A4C: 29.6 CM2
RIGHT VENTRICLE ID DIMENSION: 4.9 CM
SL CV LEFT ATRIUM LENGTH A2C: 5.9 CM
SL CV LV EF: 10
SL CV PED ECHO LEFT VENTRICLE DIASTOLIC VOLUME (MOD BIPLANE) 2D: 205 ML
SL CV PED ECHO LEFT VENTRICLE SYSTOLIC VOLUME (MOD BIPLANE) 2D: 185 ML
SODIUM 24H UR-SCNC: 79 MOL/L
SODIUM SERPL-SCNC: 144 MMOL/L (ref 135–147)
T WAVE AXIS: 145 DEGREES
TSH SERPL DL<=0.05 MIU/L-ACNC: 0.92 UIU/ML (ref 0.45–4.5)
UUN 24H UR-MCNC: 417 MG/DL
VENTRICULAR RATE: 122 BPM
WBC # BLD AUTO: 8.93 THOUSAND/UL (ref 4.31–10.16)

## 2022-10-14 PROCEDURE — 99223 1ST HOSP IP/OBS HIGH 75: CPT | Performed by: INTERNAL MEDICINE

## 2022-10-14 PROCEDURE — 93010 ELECTROCARDIOGRAM REPORT: CPT | Performed by: INTERNAL MEDICINE

## 2022-10-14 PROCEDURE — 93306 TTE W/DOPPLER COMPLETE: CPT

## 2022-10-14 PROCEDURE — 99233 SBSQ HOSP IP/OBS HIGH 50: CPT | Performed by: INTERNAL MEDICINE

## 2022-10-14 PROCEDURE — 80048 BASIC METABOLIC PNL TOTAL CA: CPT | Performed by: INTERNAL MEDICINE

## 2022-10-14 PROCEDURE — 93306 TTE W/DOPPLER COMPLETE: CPT | Performed by: INTERNAL MEDICINE

## 2022-10-14 PROCEDURE — 85027 COMPLETE CBC AUTOMATED: CPT | Performed by: INTERNAL MEDICINE

## 2022-10-14 PROCEDURE — 99223 1ST HOSP IP/OBS HIGH 75: CPT | Performed by: NURSE PRACTITIONER

## 2022-10-14 PROCEDURE — 83735 ASSAY OF MAGNESIUM: CPT | Performed by: INTERNAL MEDICINE

## 2022-10-14 PROCEDURE — 84443 ASSAY THYROID STIM HORMONE: CPT | Performed by: INTERNAL MEDICINE

## 2022-10-14 RX ORDER — ASPIRIN 81 MG/1
81 TABLET ORAL DAILY
Status: DISCONTINUED | OUTPATIENT
Start: 2022-10-15 | End: 2022-10-17

## 2022-10-14 RX ORDER — MAGNESIUM SULFATE HEPTAHYDRATE 40 MG/ML
2 INJECTION, SOLUTION INTRAVENOUS ONCE
Status: COMPLETED | OUTPATIENT
Start: 2022-10-14 | End: 2022-10-14

## 2022-10-14 RX ADMIN — APIXABAN 2.5 MG: 5 TABLET, FILM COATED ORAL at 09:00

## 2022-10-14 RX ADMIN — METOPROLOL TARTRATE 5 MG: 5 INJECTION INTRAVENOUS at 02:32

## 2022-10-14 RX ADMIN — METOPROLOL SUCCINATE 100 MG: 50 TABLET, EXTENDED RELEASE ORAL at 10:08

## 2022-10-14 RX ADMIN — APIXABAN 2.5 MG: 5 TABLET, FILM COATED ORAL at 17:46

## 2022-10-14 RX ADMIN — SERTRALINE HYDROCHLORIDE 100 MG: 50 TABLET ORAL at 09:01

## 2022-10-14 RX ADMIN — DILTIAZEM HYDROCHLORIDE 2.5 MG/HR: 5 INJECTION, SOLUTION INTRAVENOUS at 07:52

## 2022-10-14 RX ADMIN — PANTOPRAZOLE SODIUM 40 MG: 40 TABLET, DELAYED RELEASE ORAL at 05:04

## 2022-10-14 RX ADMIN — QUETIAPINE FUMARATE 50 MG: 25 TABLET ORAL at 22:04

## 2022-10-14 RX ADMIN — FUROSEMIDE 40 MG: 10 INJECTION, SOLUTION INTRAMUSCULAR; INTRAVENOUS at 17:46

## 2022-10-14 RX ADMIN — PRAVASTATIN SODIUM 80 MG: 40 TABLET ORAL at 17:46

## 2022-10-14 RX ADMIN — MAGNESIUM SULFATE HEPTAHYDRATE 2 G: 40 INJECTION, SOLUTION INTRAVENOUS at 09:19

## 2022-10-14 RX ADMIN — FOLIC ACID-PYRIDOXINE-CYANOCOBALAMIN TAB 2.5-25-2 MG 1 TABLET: 2.5-25-2 TAB at 10:08

## 2022-10-14 RX ADMIN — FUROSEMIDE 40 MG: 10 INJECTION, SOLUTION INTRAMUSCULAR; INTRAVENOUS at 09:02

## 2022-10-14 NOTE — PROGRESS NOTES
[address]  Progress Note Shawnee Butt 1935, 80 y o  male MRN: [id number]  Unit/Bed#: ICU 05-01 Encounter: [id number]  Primary Care Provider: Jose Alejandro Grimes DO   Date and time admitted to hospital: 10/13/2022 10:51 AM    * Atrial fibrillation with RVR ([facility] [address] )  Assessment & Plan  · History of PAF with RANJITH and CVN in 2015  · Atrial fibrillation with HR that remains in the 120s despite up-titration of home beta-blocker  · [id number]: 6-7   · Continue Toprol to 100mg daily  · Initiate Diltiazem gtt for tighter HR control  · Will decrease Eliquis to 2 5mg BID given renal impairment  · Cardiology following    Acute systolic congestive heart failure [facility])  Assessment & Plan  Wt Readings from Last 3 Encounters:   10/14/22 113 kg (249 lb)   07/06/18 122 kg (270 lb)     · History of anterior MI  · BNP: 1272  · CXR (10/13): Moderate pulmonary edema, moderate right and small left pleural effusions  · TTE (10/14): LVEF 10-15%  There is severe global hypokinesis  Unable to assess diastolic function due to atrial fibrillation  Right ventricular cavity size is mildly dilated  The left atrium is moderately dilated  The right atrium is moderately dilated    · Continue Toprol as above; Hold ACE-I given DANIEL (resume when appropriate)  · Continue Lasix 40mg IV BID  · Monitor Is and Os and daily weights; Fluid and Na restricted diet  · Cardiology following        DANIEL (acute kidney injury) ([facility] )  Assessment & Plan  · Creatinine has increased to 1 6 this AM  · This is in the setting of rapid atrial fibrillation and acute systolic heart failure with severely reduced EF  · Will continue diuresis with careful monitoring of labs  · Discontinue Lisinopril at this time  · Nephrology following     CAD (coronary artery disease), native coronary artery  Assessment & Plan  · Continue ASA 81mg, Toprol as above, and substitute Pravastatin for home Simvastatin     History of CVA (cerebrovascular accident)  Assessment & Plan  · 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  · Continue ASA, Eliquis, and Pravastatin as above    Chronic respiratory failure with hypoxia (HCC)  Assessment & Plan  · Patient notes that he is on 3L NC QHS chronically  · Continue to monitor respiratory status/oxygen requirements while admitted  · Management for CHF as documented above       VTE Pharmacologic Prophylaxis: VTE Score: 4 Moderate Risk (Score 3-4) - Pharmacological DVT Prophylaxis Ordered: apixaban (Eliquis)  Patient Centered Rounds: I performed bedside rounds with nursing staff today  Discussions with Specialists or Other Care Team Provider: Cardiology, Nursing, and CM  Education and Discussions with Family / Patient: Updated  (son) at bedside  Time Spent for Care: 45 minutes  More than 50% of total time spent on counseling and coordination of care as described above  Current Length of Stay: 1 day(s)  Current Patient Status: Inpatient   Certification Statement: The patient will continue to require additional inpatient hospital stay due to atrial fibrillation with RVR and acute systolic HF  Discharge Plan: TBD based on clinical improvement  Code Status: Level 1 - Full Code    Subjective:   Patient resting in bed  He denies significant shortness of breath but appears to have increased work of breathing  Remains in rapid a fib  No further significant over night events reported  Objective:     Vitals:   Temp (24hrs), Av 5 °F (36 4 °C), Min:97 2 °F (36 2 °C), Max:97 6 °F (36 4 °C)    Temp:  [97 2 °F (36 2 °C)-97 6 °F (36 4 °C)] 97 6 °F (36 4 °C)  HR:  [] 94  Resp:  [15-40] 22  BP: ()/(60-99) 121/80  SpO2:  [89 %-95 %] 93 %  Body mass index is 33 77 kg/m²  Input and Output Summary (last 24 hours):      Intake/Output Summary (Last 24 hours) at 10/14/2022 1444  Last data filed at 10/14/2022 1201  Gross per 24 hour   Intake 718 ml   Output 1460 ml   Net -742 ml       Physical Exam:   Physical Exam  Vitals and nursing note reviewed  Constitutional:       Appearance: Normal appearance  He is obese  HENT:      Head: Normocephalic and atraumatic  Mouth/Throat:      Mouth: Mucous membranes are moist       Pharynx: Oropharynx is clear  Eyes:      Extraocular Movements: Extraocular movements intact  Conjunctiva/sclera: Conjunctivae normal    Cardiovascular:      Rate and Rhythm: Tachycardia present  Rhythm irregular  Pulses: Normal pulses  Heart sounds: Normal heart sounds  Pulmonary:      Effort: Pulmonary effort is normal       Breath sounds: Rhonchi present  No wheezing  Comments: 3L NC  Abdominal:      General: Bowel sounds are normal  There is no distension  Palpations: Abdomen is soft  Tenderness: There is no abdominal tenderness  Musculoskeletal:         General: Normal range of motion  Cervical back: Normal range of motion and neck supple  Skin:     General: Skin is warm and dry  Neurological:      General: No focal deficit present  Mental Status: He is alert and oriented to person, place, and time  Mental status is at baseline     Psychiatric:         Mood and Affect: Mood normal          Behavior: Behavior normal          Judgment: Judgment normal           Labs:  Results from last 7 days   Lab Units 10/14/22  0514 10/13/22  1133   WBC Thousand/uL 8 93 8 78   HEMOGLOBIN g/dL 15 1 15 2   HEMATOCRIT % 48 2 48 8   PLATELETS Thousands/uL 243 235   NEUTROS PCT %  --  78*   LYMPHS PCT %  --  12*   MONOS PCT %  --  8   EOS PCT %  --  1     Results from last 7 days   Lab Units 10/14/22  0514 10/13/22  1133   SODIUM mmol/L 144 144   POTASSIUM mmol/L 4 0 3 9   CHLORIDE mmol/L 105 105   CO2 mmol/L 26 28   BUN mg/dL 33* 28*   CREATININE mg/dL 1 60* 1 29   ANION GAP mmol/L 13 11   CALCIUM mg/dL 9 1 9 3   ALBUMIN g/dL  --  3 7   TOTAL BILIRUBIN mg/dL  --  1 57*   ALK PHOS U/L  --  67   ALT U/L  --  136*   AST U/L  --  97*   GLUCOSE RANDOM mg/dL 105 137 Results from last 7 days   Lab Units 10/13/22  1133   INR  1 32*             Results from last 7 days   Lab Units 10/13/22  1644 10/13/22  1416 10/13/22  1133   LACTIC ACID mmol/L 0 9 2 4*  --    PROCALCITONIN ng/ml  --   --  <0 05       Lines/Drains:  Invasive Devices  Report    Peripheral Intravenous Line  Duration           Peripheral IV 10/13/22 Left Hand 1 day                  Telemetry:  Telemetry Orders (From admission, onward)             48 Hour Telemetry Monitoring  Continuous x 48 hours        References:    Telemetry Guidelines   Question:  Reason for 48 Hour Telemetry  Answer:  Acute Decompensated CHF (continuous diuretic infusion or total diuretic dose > 200 mg daily, associated electrolyte derangement, ionotropic drip, history of ventricular arrhythmia, or new EF <35%)                 Telemetry Reviewed: Atrial fibrillation  HR averaging 120  Indication for Continued Telemetry Use: Arrthymias requiring medical therapy             Imaging: Reviewed radiology reports from this admission including: ECHO    Recent Cultures (last 7 days):   Results from last 7 days   Lab Units 10/13/22  1144 10/13/22  1133   BLOOD CULTURE  Received in Microbiology Lab  Culture in Progress  Received in Microbiology Lab  Culture in Progress         Last 24 Hours Medication List:   Current Facility-Administered Medications   Medication Dose Route Frequency Provider Last Rate   • acetaminophen  650 mg Oral Q4H PRN Lana Wenin Witt, DO     • apixaban  2 5 mg Oral BID Marilu Gilmore, DO     • [START ON 10/15/2022] aspirin  81 mg Oral Daily Marilu Gilmore, DO     • diltiazem  1-15 mg/hr Intravenous Titrated [facility], DO 10 mg/hr (11/03/73 9861)   • folic acid-pyridoxine-cyanocobalamin  1 tablet Oral Daily [facility], DO     • furosemide  40 mg Intravenous BID Lanabernabe Wenin Witt, DO     • metoprolol succinate  100 mg Oral Daily [facility], DO     • ondansetron  4 mg Intravenous Q6H PRN Lana Bloom Witt, DO     • pantoprazole  40 mg Oral Early Morning Hudson Lord Witt, DO     • pravastatin  80 mg Oral Daily With [address] Marin, DO     • QUEtiapine  50 mg Oral HS Bob Lantigua, DO     • sertraline  100 mg Oral Daily Bob Lantigua, DO          Today, Patient Was Seen By: Bob Lantigua    **Please Note: This note may have been constructed using a voice recognition system  **

## 2022-10-14 NOTE — ASSESSMENT & PLAN NOTE
· Creatinine has increased to 1 6 this AM  · This is in the setting of rapid atrial fibrillation and acute systolic heart failure with severely reduced EF  · Will continue diuresis with careful monitoring of labs  · Discontinue Lisinopril at this time  · Nephrology following

## 2022-10-14 NOTE — ASSESSMENT & PLAN NOTE
-thought possibly related to atrial fibrillation in the past however loop explantation in 2018  -qualifies for oral anticoagulation at this time however with newly elevated creatinine and age greater than 80 will reduce Eliquis to 2 5 mg daily at this time but if renal function improves will reinitiate a 5 mg twice daily at that time

## 2022-10-14 NOTE — ASSESSMENT & PLAN NOTE
-rates still appear on controlled in the 120s on telemetry  -will initiate Cardizem infusion continue metoprolol therapy at this time however will need to be careful and monitor on telemetry with uptitration  - As patient's creatinine is 1 6 today will need to reduce Eliquis to 2 5 mg twice daily however if patient's renal function does improve can then consider re-initiation of 5 mg twice daily at that time

## 2022-10-14 NOTE — ASSESSMENT & PLAN NOTE
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

## 2022-10-14 NOTE — CONSULTS
[address] [facility] 1935, 80 y o  male MRN: [id number]  Unit/Bed#: ICU 05-01 Encounter: [id number]  Primary Care Provider: Raudel Arias DO   Date and time admitted to hospital: 10/13/2022 10:51 AM    Inpatient consult to Cardiology  Consult performed by: Tiffanie Connell DO  Consult ordered by: Jah Packer DO          Benign essential HTN  Assessment & Plan  -continue monitor on medical therapy    History of CVA (cerebrovascular accident)  Assessment & Plan  -thought possibly related to atrial fibrillation in the past however loop explantation in 2018  -qualifies for oral anticoagulation at this time however with newly elevated creatinine and age greater than 80 will reduce Eliquis to 2 5 mg daily at this time but if renal function improves will reinitiate a 5 mg twice daily at that time  CAD (coronary artery disease), native coronary artery  Assessment & Plan  -with history of MI and LAD stenting in the past  -as patient is going to be on anticoagulation with Eliquis going forward could discontinue Aggrenox and consider aspirin 81 mg daily pending how patient does and overall bleeding risk  -can continue metoprolol however patient's blood pressure she does not tolerate as he appeared to improve with heart rates after Cardizem bolusing may be a better candidate for Cardizem at that time      Acute congestive heart failure (HCC)  Assessment & Plan  Wt Readings from Last 3 Encounters:   10/14/22 113 kg (249 lb)   07/06/18 122 kg (270 lb)       -LVEF significantly reduced 10-15% on transthoracic echocardiogram 10/14/2022  -unfortunately as patient's renal function increase significantly with diuresis would recommend holding until patient can be seen and evaluated by Nephrology at this time  -as there is concern that component of his significant cardiomyopathy he is tachy mediated will attempt better rate control strategy initiate Cardizem infusion however can continue metoprolol therapy as well   -will recommend patient be on salt fluid restricted diet and would monitor strict I&Os if patient allows  -will need to continue monitor patient very closely at this time  * Atrial fibrillation with RVR (HCC)  Assessment & Plan  -rates still appear on controlled in the 120s on telemetry  -will initiate Cardizem infusion continue metoprolol therapy at this time however will need to be careful and monitor on telemetry with uptitration  - As patient's creatinine is 1 6 today will need to reduce Eliquis to 2 5 mg twice daily however if patient's renal function does improve can then consider re-initiation of 5 mg twice daily at that time      Other summary comments:   -in the setting of transthoracic echocardiogram showing severely reduced LVEF approximately 10-15% concern for possible tachy mediated cardiomyopathy however cannot completely exclude component of ischemic cardiomyopathy as patient has had issues with this in the past with known coronary artery disease   -will recommend initiation of Cardizem infusion to assist with better rate control strategy at this time and would continue anticoagulation   -as patient's renal function worsened with  IV diuresis will recommend holding at this time until patient can be seen and evaluated by Nephrology and will appreciate their assistance however patient does still appear to have JVD on exam however only minimal lower extremity edema is present   -I was able to have long discussion with patient's son Heidi Acevedo by phone and he is going to speak with his sister about overall long-term plans but is still okay with less proceeding with medical therapy at this time   -would recommend monitoring patient on telemetry, continue to follow strict I&Os if patient allows and monitoring renal function electrolytes with goal potassium 4 0, magnesium 2 0       HPI: Suhas Auguste is a 80y o  year old male with questionable history of atrial fibrillation, CVA in 2015 with loop recorder implantation at that time, prior CAD with MI in LAD stenting to that with documented ischemic cardiomyopathy however EF initially 40% that improved on RANJITH in 2018 along with hypertension, hyperlipidemia, loop recorder explantation in April of 2018 as it was and does life battery who has not followed with a cardiologist in many years after Dr Yohana Adler retired and presented to [place] [id number] for evaluation of fatigue and generalized weakness that have been worsening over the last several weeks  -per patient at this time he denies any active chest pain, palpitations, lightheadedness or dizziness, loss of consciousness or shortness of breath  He states that apart from being fatigued from not sleeping well last night he feels reasonably well and notes that his son brought him to the hospital due to fatigue and weakness over the last couple days   -I was able to call and speak with patient's son Bereket Guerra who notes that the patient has been having issues with visual hallucinations and is placed on Seroquel by Neurology over the last couple weeks and has been having issues with worsening fatigue and shortness of breath on exertion over the last several weeks as well  They had been checking his oxygen numbers and to assist with his shortness of breath had been using 3 L nasal cannula at home which had not been improving his symptoms in the patient's son notes that over the last week or so he has been sleeping approximately 16 hours a day   -emergency department patient was found to be in atrial fibrillation with rapid ventricular response with bundle-branch block and was admitted along with chest x-ray showing pulmonary edema pleural effusions with elevated BNP was given IV diuretics        EKG:   -currently atrial fibrillation on telemetry with bundle-branch block heart rate 120 beats per minute    MOST  RECENT CARDIAC IMAGING:   -transthoracic echocardiogram 10/14/2022 showing left ventricular systolic function severely reduced estimated LVEF 10-15%      Review of Systems:   Review of Systems   Constitutional: Positive for fatigue  Negative for chills, diaphoresis and fever  HENT: Negative for trouble swallowing and voice change  Eyes: Negative for pain and redness  Respiratory: Positive for shortness of breath (Dyspnea on exertion)  Negative for wheezing  Cardiovascular: Negative for chest pain, palpitations and leg swelling  Gastrointestinal: Negative for abdominal pain, constipation, diarrhea, nausea and vomiting  Genitourinary: Negative for dysuria  Musculoskeletal: Positive for arthralgias  Negative for neck pain  Skin: Negative for rash  Neurological: Positive for weakness ( generalized weakness)  Negative for dizziness, syncope and headaches  Psychiatric/Behavioral: Positive for hallucinations ( intermittent)  Negative for agitation  Historical Information   History reviewed  No pertinent past medical history  History reviewed  No pertinent surgical history  Social History     Substance and Sexual Activity   Alcohol Use Not Currently     Social History     Substance and Sexual Activity   Drug Use Never     Social History     Tobacco Use   Smoking Status Never Smoker   Smokeless Tobacco Never Used       Family History:   History reviewed  No pertinent family history      Meds/Allergies   all current active meds have been reviewed  Medications Prior to Admission   Medication   • QUEtiapine (SEROquel) 25 mg tablet   • aspirin-dipyridamole (AGGRENOX)  mg per 12 hr capsule   • Cholecalciferol 50 MCG (5138 UT) CAPS   • folic acid (FOLVITE) 1 mg tablet   • folic acid-pyridoxine-cyanocobalamin (FOLBIC) 2 5-25-2 mg   • hydrochlorothiazide (MICROZIDE) 12 5 mg capsule   • metoprolol succinate (TOPROL-XL) 25 mg 24 hr tablet   • pantoprazole (PROTONIX) 40 mg tablet   • ramipril (ALTACE) 10 MG capsule   • sertraline (ZOLOFT) 50 mg tablet   • simvastatin (ZOCOR) 40 mg tablet       No Known Allergies    Objective   Vitals: Blood pressure 137/94, pulse (!) 122, temperature (!) 97 4 °F (36 3 °C), temperature source Oral, resp  rate (!) 32, height 6' (1 829 m), weight 113 kg (249 lb), SpO2 92 %  , Body mass index is 33 77 kg/m² ,   Orthostatic Blood Pressures    Flowsheet Row Most Recent Value   Blood Pressure 137/94 filed at 10/14/2022 0736   Patient Position - Orthostatic VS Lying filed at 10/13/2022 8737          Systolic (32SQI), OVZ:054 , Min:96 , ZMA:448     Diastolic (67RQO), WLU:96, Min:62, Max:115      Physical Exam:  Physical Exam  Vitals reviewed  Constitutional:       General: He is not in acute distress  Appearance: He is obese  He is not diaphoretic  HENT:      Head: Normocephalic and atraumatic  Comments: Nasal cannula oxygen in place  Eyes:      General:         Right eye: No discharge  Left eye: No discharge  Neck:      Comments: Trachea midline, JVD present  Cardiovascular:      Rate and Rhythm: Tachycardia present  Comments: Irregularly irregular rate and rhythm  Pulmonary:      Effort: No respiratory distress  Comments: Decreased breath sounds bilaterally, crackles in lung bases  Chest:      Chest wall: No tenderness  Musculoskeletal:      Right lower leg: Edema (trace) present  Left lower leg: Edema (trace) present  Skin:     General: Skin is warm and dry  Neurological:      Mental Status: He is alert        Comments: Awake, alert, able to answer simple questions questions appropriately   Psychiatric:      Comments: Pleasant mood, pleasant affect           Lab Results:     Troponins:    Results from last 7 days   Lab Units 10/13/22  1644 10/13/22  1416 10/13/22  1133   HS TNI 0HR ng/L  --   --  24   HS TNI 2HR ng/L  --  27  --    HSTNI D2 ng/L  --  3  --    HS TNI 4HR ng/L 24  --   --    HSTNI D4 ng/L 0  --   --      BNP:   Results from last 6 Months   Lab Units 10/13/22  1133 BNP pg/mL 1,272*       CBC :   Results from last 7 days   Lab Units 10/14/22  0514 10/13/22  1133   WBC Thousand/uL 8 93 8 78   HEMOGLOBIN g/dL 15 1 15 2   HEMATOCRIT % 48 2 48 8   MCV fL 101* 100*   PLATELETS Thousands/uL 243 235     TSH:     CMP:   Results from last 7 days   Lab Units 10/14/22  0514 10/13/22  1133   POTASSIUM mmol/L 4 0 3 9   CHLORIDE mmol/L 105 105   CO2 mmol/L 26 28   BUN mg/dL 33* 28*   CREATININE mg/dL 1 60* 1 29   AST U/L  --  97*   ALT U/L  --  136*   EGFR ml/min/1 73sq m 38 49     Lipid Profile:     Coags:   Results from last 7 days   Lab Units 10/13/22  1133   INR  1 32*

## 2022-10-14 NOTE — ASSESSMENT & PLAN NOTE
-with history of MI and LAD stenting in the past  -as patient is going to be on anticoagulation with Eliquis going forward could discontinue Aggrenox and consider aspirin 81 mg daily pending how patient does and overall bleeding risk  -can continue metoprolol however patient's blood pressure she does not tolerate as he appeared to improve with heart rates after Cardizem bolusing may be a better candidate for Cardizem at that time

## 2022-10-14 NOTE — ASSESSMENT & PLAN NOTE
Wt Readings from Last 3 Encounters:   10/14/22 113 kg (249 lb)   07/06/18 122 kg (270 lb)     · History of anterior MI  · BNP: 1272  · CXR (10/13): Moderate pulmonary edema, moderate right and small left pleural effusions  · TTE (10/14): LVEF 10-15%  There is severe global hypokinesis  Unable to assess diastolic function due to atrial fibrillation  Right ventricular cavity size is mildly dilated  The left atrium is moderately dilated  The right atrium is moderately dilated    · Continue Toprol as above; Hold ACE-I given DANIEL (resume when appropriate)  · Continue Lasix 40mg IV BID  · Monitor Is and Os and daily weights; Fluid and Na restricted diet  · Cardiology following

## 2022-10-14 NOTE — CASE MANAGEMENT
Case Management Assessment    Patient name Edilson Díaz  Location ICU 05/ICU 05- MRN [id number]  : 1935 Date 10/14/2022       Current Admission Date: 10/13/2022  Current Admission Diagnosis:Atrial fibrillation with RVR [facility])   Patient Active Problem List    Diagnosis Date Noted   • DANIEL (acute kidney injury) ([facility] [address] ) 10/14/2022   • Atrial fibrillation with RVR ([facility] [address] )    • Acute systolic congestive heart failure ([facility] [address] ) 10/13/2022   • CAD (coronary artery disease), native coronary artery 10/13/2022   • History of CVA (cerebrovascular accident) 10/13/2022   • Benign essential HTN 10/13/2022   • Hallucinations 10/13/2022   • Chronic respiratory failure with hypoxia ([facility] [address] ) 10/13/2022      LOS (days): 1  Geometric Mean LOS (GMLOS) (days): 2 30  Days to GMLOS:1 2     OBJECTIVE:    Risk of Unplanned Readmission Score: 15 09     Current admission status: Inpatient    Preferred Pharmacy:   [address] [place] [address] [id number] [facility] [address]  [address] [place] [id number]  Phone: [phone] Fax: [phone]    Primary Care Provider: Kristina Caceres DO    Primary Insurance: MEDICARE  Secondary Insurance: [address] [place] [address]    ASSESSMENT:  Active Health Care Proxies    There are no active Health Care Proxies on file  Advance Directives  Does patient have a Health Care POA?: Yes  Does patient have Advance Directives?: Yes  Advance Directives: Living will, Power of  for health care  Primary Contact: Tae Shelton son    Readmission Root Cause  30 Day Readmission: No    Patient Information  Admitted from[de-identified] Home  Mental Status: Alert  During Assessment patient was accompanied by: Not accompanied during assessment  Assessment information provided by[de-identified] Patient  Primary Caregiver: Self  Support Systems: Andrew  [place] of Residence: [address] [place] [address] do you live in?: [email or web address] Street entry access options  Select all that apply : Stairs  Number of steps to enter home  : 1  Do the steps have railings?: No  Type of Current Residence: [place]  In the last 12 months, was there a time when you were not able to pay the mortgage or rent on time?: No  In the last 12 months, how many places have you lived?: 1  In the last 12 months, was there a time when you did not have a steady place to sleep or slept in a shelter (including now)?: No  Homeless/housing insecurity resource given?: N/A  Living Arrangements: Lives w/ Son  Is patient a ?: No    Activities of Daily Living Prior to Admission  Functional Status: Independent  Completes ADLs independently?: Yes  Ambulates independently?: Yes  Does patient use assisted devices?: Yes  Assisted Devices (DME) used: Straight Cane  Does patient currently own DME?: Yes  What DME does the patient currently own?: Portable Oxygen concentrator, Home Oxygen concentrator (Levonne Craw)  Does patient have a history of Outpatient Therapy (PT/OT)?: No  Does the patient have a history of Short-Term Rehab?: No  Does patient have a history of HHC?: No  [facility] No    Patient Information Continued  Income Source: Pension/correction  Does patient have prescription coverage?: Yes  Within the past 12 months, you worried that your food would run out before you got the money to buy more : Never true  Within the past 12 months, the food you bought just didn't last and you didn't have money to get more : Never true  Food insecurity resource given?: N/A  Does patient receive dialysis treatments?: No  Does patient have a history of Mental Health Diagnosis?: No    PHQ 2/9 Screening   Reviewed PHQ 2/9 Depression Screening Score?: No    Means of Transportation  Means of Transport to Appts[de-identified] Family transport  In the past 12 months, has lack of transportation kept you from medical appointments or from getting medications?: No  In the past 12 months, has lack of transportation kept you from meetings, work, or from getting things needed for daily living?: No  Was application for public transport provided?: N/A

## 2022-10-14 NOTE — CONSULTS
CONSULTATION-NEPHROLOGY   Lin Tay 80 y o  male MRN: [id number]  Unit/Bed#: ICU 05-01 Encounter: [id number]        Assessment and Plan:    1  Acute kidney injury (POA) atop chronic kidney disease  · Etiology is likely prerenal due to decreased perfusion from atrial fibrillation with rapid ventricular response and volume overloaded state, decompensated heart failure in at risk for cardiorenal syndrome  Obstruction less likely but has not been ruled out  · Check urine chemistries  · Check PVR and follow urinary retention protocol  · Continue diuresis with Lasix 40 mg IV b i d  and escalate if necessary  Patient has not lost any weight  · Appreciate cardiology's assistance with maximizing hemodynamics  · Continue to avoid potential nephrotoxins  2  Stage 2 chronic kidney disease with baseline creatinine around 0 9-1 1 mg/dL  · Etiology likely age-related nephron loss, underlying cardiorenal syndrome, hypertensive nephrosclerosis  3  Acute on chronic heart failure reduced ejection fraction  · New finding EF 10-15% on echocardiogram   Monitor response with Lasix 40 mg IV b i d   Goal net-1 5-2 L in 24 hours  Borderline hypernatremia, will liberalize fluid restriction and add sodium restriction  Continue daily weight and strict I&O  · Currently not a candidate for Ace/Arb  4  Atrial fibrillation with rapid ventricular response  · On Cardizem infusion per Cardiology  5  Hypertension the setting of CKD  · Blood pressure low normal   Maintain mean arterial pressure greater than 65 mmHg  6   Hypomagnesemia  · To receive magnesium per primary team    HPI:    Lin Tay is a 80 y o  male with CKD 2, CVA in 2015 status post loop recorder, CAD with history of MI and THUAN, previous EF 40% attributed to ischemic cardiomyopathy, questionable history of atrial fibrillation, hypertension, obesity presented 10/13 for fatigue and hallucinations admitted to ICU for atrial fibrillation with rapid ventricular response, pulmonary edema and pleural effusions on chest x-ray, acute kidney injury  Updated echocardiogram revealed significantly reduced LVEF 10-15%  He was evaluated by Cardiology  A nephrology consultation was requested to assist with management  Upon discussion with the patient he is a poor historian  He states he was feeling strange and confused prior to coming in  He admits to some shortness of breath on exam   He has no other physical complaints  From a Nephrology perspective, does not follow with a nephrologist   Has stage 2 chronic kidney disease with baseline creatinine around 0 9-1 1 mg/dL  Reason for Consult:  Acute kidney injury atop chronic kidney disease    Review of Systems:  A complete 10-point review of systems was performed  Aside from what was mentioned in the HPI, it is otherwise negative  Historical Information   History reviewed  No pertinent past medical history  History reviewed  No pertinent surgical history  Social History   Social History     Substance and Sexual Activity   Alcohol Use Not Currently     Social History     Substance and Sexual Activity   Drug Use Never     Social History     Tobacco Use   Smoking Status Never Smoker   Smokeless Tobacco Never Used       Family History:   History reviewed  No pertinent family history      Medications:  Pertinent medications were reviewed  Current Facility-Administered Medications   Medication Dose Route Frequency Provider Last Rate   • acetaminophen  650 mg Oral Q4H PRN [facility], DO     • apixaban  2 5 mg Oral BID Corey Monterroso, DO     • [START ON 10/15/2022] aspirin  81 mg Oral Daily Corey Monterroso DO     • diltiazem  1-15 mg/hr Intravenous Titrated [facility], DO 10 mg/hr (72/18/27 1073)   • folic acid-pyridoxine-cyanocobalamin  1 tablet Oral Daily [facility], DO     • furosemide  40 mg Intravenous BID [facility], DO     • magnesium sulfate  2 g Intravenous Once Cholo Hooks DO     • metoprolol succinate  100 mg Oral Daily [facility], DO     • ondansetron  4 mg Intravenous Q6H PRN [facility], DO     • pantoprazole  40 mg Oral Early Morning [facility], DO     • pravastatin  80 mg Oral Daily [address], DO     • QUEtiapine  50 mg Oral HS [facility], DO     • sertraline  100 mg Oral Daily [facility], DO           No Known Allergies      Vitals:   /60 (BP Location: Right arm)   Pulse (!) 122   Temp (!) 97 4 °F (36 3 °C) (Oral)   Resp (!) 32   Ht 6' (1 829 m)   Wt 113 kg (249 lb)   SpO2 92%   BMI 33 77 kg/m²   Body mass index is 33 77 kg/m²  SpO2: 92 %,   SpO2 Activity: At Rest,   O2 Device: Nasal cannula      Intake/Output Summary (Last 24 hours) at 10/14/2022 0941  Last data filed at 10/14/2022 0800  Gross per 24 hour   Intake 420 ml   Output 1460 ml   Net -1040 ml     Invasive Devices  Report    Peripheral Intravenous Line  Duration           Peripheral IV 10/13/22 Left Hand 1 day                Physical Exam:  General: conscious, cooperative, in no acute distress  Eyes: conjunctivae pink, anicteric sclerae  ENT: lips and mucous membranes moist  Neck: supple, no JVD, no masses  Chest:  Diminished breath sounds bilateral, no crackles, ronchus or wheezings  CVS: S1 & S2, tachycardic rate, regular rhythm  Abdomen: soft, non-tender, non-distended, normoactive bowel sounds obese  Extremities:  Moderate edema of both legs  Skin: no rash  Neuro: awake, alert, oriented hard of hearing      Diagnostic Data:  Lab: I have personally reviewed pertinent lab results  ,   CBC:  Results from last 7 days   Lab Units 10/14/22  0514   WBC Thousand/uL 8 93   HEMOGLOBIN g/dL 15 1   HEMATOCRIT % 48 2   PLATELETS Thousands/uL 243      CMP:   Lab Results   Component Value Date    SODIUM 144 10/14/2022    K 4 0 10/14/2022     10/14/2022    CO2 26 10/14/2022    BUN 33 (H) 10/14/2022    CREATININE 1 60 (H) 10/14/2022    CALCIUM 9 1 10/14/2022    AST 97 (H) 10/13/2022     (H) 10/13/2022    ALKPHOS 67 10/13/2022    EGFR 38 10/14/2022   ,   PT/INR:   Lab Results   Component Value Date    INR 1 32 (H) 10/13/2022   ,   Magnesium: No components found for: MAG,  Phosphorous: No results found for: PHOS    Microbiology:  @LABRCNTIP,(urinecx:7)@        Kelly Hay    Portions of the record may have been created with voice recognition software  Occasional wrong word or "sound a like" substitutions may have occurred due to the inherent limitations of voice recognition software  Read the chart carefully and recognize, using context, where substitutions have occurred

## 2022-10-14 NOTE — ASSESSMENT & PLAN NOTE
· History of PAF with RANJITH and CVN in 2015  · Atrial fibrillation with HR that remains in the 120s despite up-titration of home beta-blocker  · [id number]: 6-7   · Continue Toprol to 100mg daily  · Initiate Diltiazem gtt for tighter HR control  · Will decrease Eliquis to 2 5mg BID given renal impairment  · Cardiology following

## 2022-10-14 NOTE — ASSESSMENT & PLAN NOTE
Wt Readings from Last 3 Encounters:   10/14/22 113 kg (249 lb)   07/06/18 122 kg (270 lb)       -LVEF significantly reduced 10-15% on transthoracic echocardiogram 10/14/2022  -unfortunately as patient's renal function increase significantly with diuresis would recommend holding until patient can be seen and evaluated by Nephrology at this time  -as there is concern that component of his significant cardiomyopathy he is tachy mediated will attempt better rate control strategy initiate Cardizem infusion however can continue metoprolol therapy as well   -will recommend patient be on salt fluid restricted diet and would monitor strict I&Os if patient allows  -will need to continue monitor patient very closely at this time

## 2022-10-14 NOTE — PLAN OF CARE
Problem: MOBILITY - ADULT  Goal: Maintain or return to baseline ADL function  Description: INTERVENTIONS:  -  Assess patient's ability to carry out ADLs; assess patient's baseline for ADL function and identify physical deficits which impact ability to perform ADLs (bathing, care of mouth/teeth, toileting, grooming, dressing, etc )  - Assess/evaluate cause of self-care deficits   - Assess range of motion  - Assess patient's mobility; develop plan if impaired  - Assess patient's need for assistive devices and provide as appropriate  - Encourage maximum independence but intervene and supervise when necessary  - Involve family in performance of ADLs  - Assess for home care needs following discharge   - Consider OT consult to assist with ADL evaluation and planning for discharge  - Provide patient education as appropriate  Outcome: Progressing  Goal: Maintains/Returns to pre admission functional level  Description: INTERVENTIONS:  - Perform BMAT or MOVE assessment daily    - Set and communicate daily mobility goal to care team and patient/family/caregiver  - Collaborate with rehabilitation services on mobility goals if consulted  - Perform Range of Motion 3 times a day  - Reposition patient every 3 hours    - Dangle patient 3 times a day  - Stand patient 3 times a day  - Ambulate patient 3 times a day  - Out of bed to chair 3 times a day   - Out of bed for meals 3 times a day  - Out of bed for toileting  - Record patient progress and toleration of activity level   Outcome: Progressing     Problem: PAIN - ADULT  Goal: Verbalizes/displays adequate comfort level or baseline comfort level  Description: Interventions:  - Encourage patient to monitor pain and request assistance  - Assess pain using appropriate pain scale  - Administer analgesics based on type and severity of pain and evaluate response  - Implement non-pharmacological measures as appropriate and evaluate response  - Consider cultural and social influences on pain and pain management  - Notify physician/advanced practitioner if interventions unsuccessful or patient reports new pain  Outcome: Progressing     Problem: INFECTION - ADULT  Goal: Absence or prevention of progression during hospitalization  Description: INTERVENTIONS:  - Assess and monitor for signs and symptoms of infection  - Monitor lab/diagnostic results  - Monitor all insertion sites, i e  indwelling lines, tubes, and drains  - Monitor endotracheal if appropriate and nasal secretions for changes in amount and color  - Jackson appropriate cooling/warming therapies per order  - Administer medications as ordered  - Instruct and encourage patient and family to use good hand hygiene technique  - Identify and instruct in appropriate isolation precautions for identified infection/condition  Outcome: Progressing  Goal: Absence of fever/infection during neutropenic period  Description: INTERVENTIONS:  - Monitor WBC    Outcome: Progressing     Problem: SAFETY ADULT  Goal: Maintain or return to baseline ADL function  Description: INTERVENTIONS:  -  Assess patient's ability to carry out ADLs; assess patient's baseline for ADL function and identify physical deficits which impact ability to perform ADLs (bathing, care of mouth/teeth, toileting, grooming, dressing, etc )  - Assess/evaluate cause of self-care deficits   - Assess range of motion  - Assess patient's mobility; develop plan if impaired  - Assess patient's need for assistive devices and provide as appropriate  - Encourage maximum independence but intervene and supervise when necessary  - Involve family in performance of ADLs  - Assess for home care needs following discharge   - Consider OT consult to assist with ADL evaluation and planning for discharge  - Provide patient education as appropriate  Outcome: Progressing  Goal: Maintains/Returns to pre admission functional level  Description: INTERVENTIONS:  - Perform BMAT or MOVE assessment daily    - Set and communicate daily mobility goal to care team and patient/family/caregiver  - Collaborate with rehabilitation services on mobility goals if consulted  - Perform Range of Motion 3 times a day  - Reposition patient every 3 hours    - Dangle patient 3 times a day  - Stand patient 3 times a day  - Ambulate patient 3 times a day  - Out of bed to chair 3 times a day   - Out of bed for meals 3 times a day  - Out of bed for toileting  - Record patient progress and toleration of activity level   Outcome: Progressing  Goal: Patient will remain free of falls  Description: INTERVENTIONS:  - Educate patient/family on patient safety including physical limitations  - Instruct patient to call for assistance with activity   - Consult OT/PT to assist with strengthening/mobility   - Keep Call bell within reach  - Keep bed low and locked with side rails adjusted as appropriate  - Keep care items and personal belongings within reach  - Initiate and maintain comfort rounds  - Make Fall Risk Sign visible to staff  - Offer Toileting every 2 Hours, in advance of need  - Initiate/Maintain chair alarm  - Obtain necessary fall risk management equipment alarms   - video monitor   - Apply yellow socks and bracelet for high fall risk patients  - Consider moving patient to room near nurses station  Outcome: Progressing     Problem: DISCHARGE PLANNING  Goal: Discharge to home or other facility with appropriate resources  Description: INTERVENTIONS:  - Identify barriers to discharge w/patient and caregiver  - Arrange for needed discharge resources and transportation as appropriate  - Identify discharge learning needs (meds, wound care, etc )  - Arrange for interpretive services to assist at discharge as needed  - Refer to Case Management Department for coordinating discharge planning if the patient needs post-hospital services based on physician/advanced practitioner order or complex needs related to functional status, cognitive ability, or social support system  Outcome: Progressing     Problem: Knowledge Deficit  Goal: Patient/family/caregiver demonstrates understanding of disease process, treatment plan, medications, and discharge instructions  Description: Complete learning assessment and assess knowledge base    Interventions:  - Provide teaching at level of understanding  - Provide teaching via preferred learning methods  Outcome: Progressing     Problem: Prexisting or High Potential for Compromised Skin Integrity  Goal: Skin integrity is maintained or improved  Description: INTERVENTIONS:  - Identify patients at risk for skin breakdown  - Assess and monitor skin integrity  - Assess and monitor nutrition and hydration status  - Monitor labs   - Assess for incontinence   - Turn and reposition patient  - Assist with mobility/ambulation  - Relieve pressure over bony prominences  - Avoid friction and shearing  - Provide appropriate hygiene as needed including keeping skin clean and dry  - Evaluate need for skin moisturizer/barrier cream  - Collaborate with interdisciplinary team   - Patient/family teaching  - Consider wound care consult   Outcome: Progressing     Problem: Potential for Falls  Goal: Patient will remain free of falls  Description: INTERVENTIONS:  - Educate patient/family on patient safety including physical limitations  - Instruct patient to call for assistance with activity   - Consult OT/PT to assist with strengthening/mobility   - Keep Call bell within reach  - Keep bed low and locked with side rails adjusted as appropriate  - Keep care items and personal belongings within reach  - Initiate and maintain comfort rounds  - Make Fall Risk Sign visible to staff  - Apply yellow socks and bracelet for high fall risk patients  - Consider moving patient to room near nurses station  Outcome: Progressing     Problem: Nutrition/Hydration-ADULT  Goal: Nutrient/Hydration intake appropriate for improving, restoring or maintaining nutritional needs  Description: Monitor and assess patient's nutrition/hydration status for malnutrition  Collaborate with interdisciplinary team and initiate plan and interventions as ordered  Monitor patient's weight and dietary intake as ordered or per policy  Utilize nutrition screening tool and intervene as necessary  Determine patient's food preferences and provide high-protein, high-caloric foods as appropriate       INTERVENTIONS:  - Monitor oral intake, urinary output, labs, and treatment plans  - Assess nutrition and hydration status and recommend course of action  - Evaluate amount of meals eaten  - Assist patient with eating if necessary   - Allow adequate time for meals  - Recommend/ encourage appropriate diets, oral nutritional supplements, and vitamin/mineral supplements  - Order, calculate, and assess calorie counts as needed  - Recommend, monitor, and adjust tube feedings and TPN/PPN based on assessed needs  - Assess need for intravenous fluids  - Provide specific nutrition/hydration education as appropriate  - Include patient/family/caregiver in decisions related to nutrition  Outcome: Progressing

## 2022-10-14 NOTE — ASSESSMENT & PLAN NOTE
· Patient notes that he is on 3L NC QHS chronically  · Continue to monitor respiratory status/oxygen requirements while admitted  · Management for CHF as documented above

## 2022-10-14 NOTE — NURSING NOTE
Awake and generally oriented  Does not know exact date  Periods of confusion with hearing deficit  Denies hallucinations at this time  Denies pain  Appears mildly distressed with labored respirations and upper airway wheezes  However, when asked pt states he feels good  Poor historian  Defers decisions to family  Moves fair in bed with moderate to normal strength  C/o mild dizziness  Heart tones are distant with no atrial kick  Pulses are weak with no peripheral edema noted  Skin is warm with moisture to myron area  Lungs are decreased with fine crackles throughout  Respirations are labored at rest   Tolerating O2 via NC with good SpO2  Abdomen is large and soft with hypoactive bowel sounds  No BM  Poor appetite  Refused breakfast   No void  Bladder scan unable to find bladder  IV site intact  Myron area is moist and reddened  Cleansed with bath wipes

## 2022-10-15 LAB
ANION GAP SERPL CALCULATED.3IONS-SCNC: 9 MMOL/L (ref 4–13)
ATRIAL RATE: 326 BPM
BUN SERPL-MCNC: 41 MG/DL (ref 5–25)
CALCIUM SERPL-MCNC: 9.2 MG/DL (ref 8.4–10.2)
CHLORIDE SERPL-SCNC: 103 MMOL/L (ref 96–108)
CO2 SERPL-SCNC: 33 MMOL/L (ref 21–32)
CREAT SERPL-MCNC: 1.88 MG/DL (ref 0.6–1.3)
ERYTHROCYTE [DISTWIDTH] IN BLOOD BY AUTOMATED COUNT: 14.8 % (ref 11.6–15.1)
GFR SERPL CREATININE-BSD FRML MDRD: 31 ML/MIN/1.73SQ M
GLUCOSE SERPL-MCNC: 109 MG/DL (ref 65–140)
HCT VFR BLD AUTO: 47.4 % (ref 36.5–49.3)
HGB BLD-MCNC: 14.8 G/DL (ref 12–17)
MAGNESIUM SERPL-MCNC: 2 MG/DL (ref 1.9–2.7)
MCH RBC QN AUTO: 31.4 PG (ref 26.8–34.3)
MCHC RBC AUTO-ENTMCNC: 31.2 G/DL (ref 31.4–37.4)
MCV RBC AUTO: 100 FL (ref 82–98)
PLATELET # BLD AUTO: 219 THOUSANDS/UL (ref 149–390)
PMV BLD AUTO: 10.3 FL (ref 8.9–12.7)
POTASSIUM SERPL-SCNC: 3.4 MMOL/L (ref 3.5–5.3)
QRS AXIS: -71 DEGREES
QRSD INTERVAL: 160 MS
QT INTERVAL: 480 MS
QTC INTERVAL: 495 MS
RBC # BLD AUTO: 4.72 MILLION/UL (ref 3.88–5.62)
S EPIDERMIDIS DNA BLD POS QL NAA+NON-PRB: DETECTED
SODIUM SERPL-SCNC: 145 MMOL/L (ref 135–147)
T WAVE AXIS: 116 DEGREES
VENTRICULAR RATE: 64 BPM
WBC # BLD AUTO: 8.52 THOUSAND/UL (ref 4.31–10.16)

## 2022-10-15 PROCEDURE — 93010 ELECTROCARDIOGRAM REPORT: CPT | Performed by: INTERNAL MEDICINE

## 2022-10-15 PROCEDURE — 85027 COMPLETE CBC AUTOMATED: CPT | Performed by: INTERNAL MEDICINE

## 2022-10-15 PROCEDURE — 99233 SBSQ HOSP IP/OBS HIGH 50: CPT | Performed by: INTERNAL MEDICINE

## 2022-10-15 PROCEDURE — 80048 BASIC METABOLIC PNL TOTAL CA: CPT | Performed by: NURSE PRACTITIONER

## 2022-10-15 PROCEDURE — 83735 ASSAY OF MAGNESIUM: CPT | Performed by: INTERNAL MEDICINE

## 2022-10-15 RX ORDER — FUROSEMIDE 40 MG/1
40 TABLET ORAL DAILY
Status: DISCONTINUED | OUTPATIENT
Start: 2022-10-16 | End: 2022-10-17 | Stop reason: HOSPADM

## 2022-10-15 RX ORDER — FUROSEMIDE 10 MG/ML
40 INJECTION INTRAMUSCULAR; INTRAVENOUS
Status: DISCONTINUED | OUTPATIENT
Start: 2022-10-15 | End: 2022-10-15

## 2022-10-15 RX ORDER — METOPROLOL TARTRATE 5 MG/5ML
5 INJECTION INTRAVENOUS EVERY 6 HOURS PRN
Status: DISCONTINUED | OUTPATIENT
Start: 2022-10-15 | End: 2022-10-17 | Stop reason: HOSPADM

## 2022-10-15 RX ORDER — METOPROLOL SUCCINATE 50 MG/1
200 TABLET, EXTENDED RELEASE ORAL DAILY
Status: DISCONTINUED | OUTPATIENT
Start: 2022-10-16 | End: 2022-10-16

## 2022-10-15 RX ORDER — POTASSIUM CHLORIDE 20 MEQ/1
40 TABLET, EXTENDED RELEASE ORAL ONCE
Status: COMPLETED | OUTPATIENT
Start: 2022-10-15 | End: 2022-10-15

## 2022-10-15 RX ADMIN — ASPIRIN 81 MG: 81 TABLET, COATED ORAL at 08:19

## 2022-10-15 RX ADMIN — POTASSIUM CHLORIDE 40 MEQ: 1500 TABLET, EXTENDED RELEASE ORAL at 08:19

## 2022-10-15 RX ADMIN — QUETIAPINE FUMARATE 50 MG: 25 TABLET ORAL at 21:36

## 2022-10-15 RX ADMIN — APIXABAN 2.5 MG: 5 TABLET, FILM COATED ORAL at 17:08

## 2022-10-15 RX ADMIN — FOLIC ACID-PYRIDOXINE-CYANOCOBALAMIN TAB 2.5-25-2 MG 1 TABLET: 2.5-25-2 TAB at 08:22

## 2022-10-15 RX ADMIN — PANTOPRAZOLE SODIUM 40 MG: 40 TABLET, DELAYED RELEASE ORAL at 05:49

## 2022-10-15 RX ADMIN — APIXABAN 2.5 MG: 5 TABLET, FILM COATED ORAL at 08:19

## 2022-10-15 RX ADMIN — SERTRALINE HYDROCHLORIDE 100 MG: 50 TABLET ORAL at 08:19

## 2022-10-15 RX ADMIN — FUROSEMIDE 40 MG: 10 INJECTION, SOLUTION INTRAMUSCULAR; INTRAVENOUS at 11:00

## 2022-10-15 RX ADMIN — PRAVASTATIN SODIUM 80 MG: 40 TABLET ORAL at 17:08

## 2022-10-15 RX ADMIN — METOPROLOL SUCCINATE 100 MG: 50 TABLET, EXTENDED RELEASE ORAL at 08:19

## 2022-10-15 RX ADMIN — METOPROLOL TARTRATE 5 MG: 5 INJECTION INTRAVENOUS at 23:29

## 2022-10-15 NOTE — ASSESSMENT & PLAN NOTE
· Creatinine has increased to 1 88 this AM  · This is in the setting of rapid atrial fibrillation and acute systolic heart failure with severely reduced EF  · Continue to hold home Lisinopril  · Lasix de-escalated to PO as above  · Nephrology following

## 2022-10-15 NOTE — NURSING NOTE
Pt noted on monitor to have an eight run of vtach  Dr Rafael Delgadillo notified and aware  No new orders at this time  Vitals entered in Epic  Pt reported no c/o chest pain or discomfort  Pt resting in bed  Call bell with in reach, will continue to monitor

## 2022-10-15 NOTE — PLAN OF CARE
Problem: MOBILITY - ADULT  Goal: Maintain or return to baseline ADL function  Description: INTERVENTIONS:  -  Assess patient's ability to carry out ADLs; assess patient's baseline for ADL function and identify physical deficits which impact ability to perform ADLs (bathing, care of mouth/teeth, toileting, grooming, dressing, etc )  - Assess/evaluate cause of self-care deficits   - Assess range of motion  - Assess patient's mobility; develop plan if impaired  - Assess patient's need for assistive devices and provide as appropriate  - Encourage maximum independence but intervene and supervise when necessary  - Involve family in performance of ADLs  - Assess for home care needs following discharge   - Consider OT consult to assist with ADL evaluation and planning for discharge  - Provide patient education as appropriate  Outcome: Progressing     Problem: INFECTION - ADULT  Goal: Absence or prevention of progression during hospitalization  Description: INTERVENTIONS:  - Assess and monitor for signs and symptoms of infection  - Monitor lab/diagnostic results  - Monitor all insertion sites, i e  indwelling lines, tubes, and drains  - Monitor endotracheal if appropriate and nasal secretions for changes in amount and color  - [place] appropriate cooling/warming therapies per order  - Administer medications as ordered  - Instruct and encourage patient and family to use good hand hygiene technique  - Identify and instruct in appropriate isolation precautions for identified infection/condition  Outcome: Progressing     Problem: SAFETY ADULT  Goal: Maintain or return to baseline ADL function  Description: INTERVENTIONS:  -  Assess patient's ability to carry out ADLs; assess patient's baseline for ADL function and identify physical deficits which impact ability to perform ADLs (bathing, care of mouth/teeth, toileting, grooming, dressing, etc )  - Assess/evaluate cause of self-care deficits   - Assess range of motion  - Assess patient's mobility; develop plan if impaired  - Assess patient's need for assistive devices and provide as appropriate  - Encourage maximum independence but intervene and supervise when necessary  - Involve family in performance of ADLs  - Assess for home care needs following discharge   - Consider OT consult to assist with ADL evaluation and planning for discharge  - Provide patient education as appropriate  Outcome: Progressing

## 2022-10-15 NOTE — PROGRESS NOTES
2300: Assumed care of patient from [address] [place] [address]  Pt sleeping comfortably, call bell within reach  2400: Assessment completed  Pt denies pain  No requests at this time  0130: Pt rang call bell, needs assistance with using urinal  Pt voided approx 75 ml clear yellow urine in urinal while sitting on side of bed  Assisted back to bed & repositioned for comfort  0209: Received critical lab result of positive blood culture 1 of 2  Notified ELANA Wise  0550: Pt attempted to use urinal, but could only void a few ml's  Bladder scanned for 475      0625: Pt straight cathed for 675 ml clear yellow urine  Pt tolerated procedure well

## 2022-10-15 NOTE — PROGRESS NOTES
Cardiology Progress Note - Karen Mays 80 y o  male MRN: [id number]    Unit/Bed#: ICU 05-01 Encounter: [id number]          Subjective:   Patient seen and examined  Resting comfortably  No complaints this AM      Hospital Course:   Karen Mays is a 80y o  year old male with a history of Atrial Fibrillation, CVA (2015), CAD with MI and PCI of LAD with documented ICM and EF of 40% (2015 in [facility] TTE), HTN, HLD, Loop Recorder explantation (2018) who presented to [place] for evaluation of fatigue, generalized weakness, SOB/NIELSEN which had been progressively worsening over the past few weeks PTA  Echo during this admission was notable for significantly reduced EF of 10-15% raising concern for tachy-mediated cardiomyopathy given presence of AF RVR on admission  Patient states that he has not seen a Cardiologists in a number of years  Vitals: Blood pressure 126/77, pulse (!) 114, temperature (!) 97 1 °F (36 2 °C), temperature source Tympanic, resp  rate (!) 27, height 6' (1 829 m), weight 112 kg (247 lb 12 8 oz), SpO2 96 %  , Body mass index is 33 61 kg/m² ,   Orthostatic Blood Pressures      Flowsheet Row Most Recent Value   Blood Pressure 126/77 filed at 10/15/2022 0700   Patient Position - Orthostatic VS Lying filed at 10/13/2022 1800              Intake/Output Summary (Last 24 hours) at 10/15/2022 0926  Last data filed at 10/15/2022 5965  Gross per 24 hour   Intake 829 04 ml   Output 1500 ml   Net -670 96 ml       Review of System:  Review of system was conducted and was negative except for as stated in the subjective course      Physical Exam:    GEN: Karen Mays appears well, alert and oriented x 3, pleasant and cooperative   HEENT:  Normocephalic, atraumatic, anicteric, moist mucous membranes  NECK: +JVD  HEART: irreg rhythm, irreg rate, normal S1 and S2, no murmurs, clicks, gallops or rubs   LUNGS: crackles in bilateral LLFs, respiration nonlabored on NC  ABDOMEN:  Normoactive bowel sounds, soft, no tenderness, no distention  EXTREMITIES: peripheral pulses palpable; 1+ edema  NEURO: no gross focal findings; cranial nerves grossly intact   SKIN:  Dry, intact, warm to touch      Current Facility-Administered Medications:     acetaminophen (TYLENOL) tablet 650 mg, 650 mg, Oral, Q4H PRN, [facility],     apixaban Odalys Awais) tablet 2 5 mg, 2 5 mg, Oral, BID, [facility], , 2 5 mg at 10/15/22 2219    aspirin (ECOTRIN LOW STRENGTH) EC tablet 81 mg, 81 mg, Oral, Daily, [facility], 81 mg at 10/15/22 0819    diltiazem (CARDIZEM) 125 mg in sodium chloride 0 9 % 125 mL infusion, 1-15 mg/hr, Intravenous, Titrated, [facility], Stopped at 98/54/97 6258    folic acid-pyridoxine-cyanocobalamin 2 5-25-2 mg per tablet 1 tablet, 1 tablet, Oral, Daily, [facility], 1 tablet at 10/15/22 0182    metoprolol succinate (TOPROL-XL) 24 hr tablet 100 mg, 100 mg, Oral, Daily, [facility], 100 mg at 10/15/22 0819    ondansetron (ZOFRAN) injection 4 mg, 4 mg, Intravenous, Q6H PRN, [facility]    pantoprazole (PROTONIX) EC tablet 40 mg, 40 mg, Oral, Early Morning, Kathy Padilla [facility], 40 mg at 10/15/22 0549    pravastatin (PRAVACHOL) tablet 80 mg, 80 mg, Oral, Daily With Dinner, Mick Breanna, DO, 80 mg at 10/14/22 1746    QUEtiapine (SEROquel) tablet 50 mg, 50 mg, Oral, HS, [facility], 50 mg at 10/14/22 2204    sertraline (ZOLOFT) tablet 100 mg, 100 mg, Oral, Daily, [facility], 100 mg at 10/15/22 7080    Labs & Results:  Results from last 7 days   Lab Units 10/13/22  1644 10/13/22  1416 10/13/22  1133   HS TNI 0HR ng/L  --   --  24   HS TNI 2HR ng/L  --  27  --    HS TNI 4HR ng/L 24  --   --          Results from last 7 days   Lab Units 10/15/22  0604 10/14/22  0514 10/13/22  1133   POTASSIUM mmol/L 3 4* 4 0 3 9   CO2 mmol/L 33* 26 28   CHLORIDE mmol/L 103 105 105   BUN mg/dL 41* 33* 28*   CREATININE mg/dL 1 88* 1 60* 1 29     Results from last 7 days   Lab Units 10/15/22  0604 10/14/22  0514 10/13/22  1133   HEMOGLOBIN g/dL 14 8 15 1 15 2   HEMATOCRIT % 47 4 48 2 48 8   PLATELETS Thousands/uL 219 243 235           Telemetry:   Personally reviewed by Evaristo Garcia:   Atrial Fibrillation with rates in 90s-100s    VTE Prophylaxis: Heparin        Assessment:  Principal Problem:    Atrial fibrillation with RVR (HCC)  Active Problems:    Acute systolic congestive heart failure (HCC)    CAD (coronary artery disease), native coronary artery    History of CVA (cerebrovascular accident)    Chronic respiratory failure with hypoxia ([facility] )    DANIEL (acute kidney injury) ([facility] )        1  Acute Biventricular HFrEF (10-15%) Exacerbation (unclear chronicity), NYHA Class III, ACC/AHA Stage C  --> Etiology: Presumably tachy-mediated in setting of underlying atrial fibrillation vs  Component of progressive CAD  2  Atrial Fibrillation with RVR  --> [id number]: 7 (Age, CHF, HTN, CVA, MI)  2  History of CAD with MI s/p PCI of LAD  3  CVA s/p ILR (2015) which was subsequently explanted in 2018 due to end of battery life  --> Presumably 2/2 AF  4  Hypertension  5  Acute Kidney Injury on CKD2 presumably 2/2 Cardiorenal Syndrome    Plan:  - c/w Eliquis 2 5mg PO BID  - c/w Aspirin 81mg pO Daily  - c/w Metoprolol Succ  100mg PO Daily  - c/w Pravachol 80mg PO QHS  - Given severely reduced EF, would avoid Cardizem due to risk of further cardiac decompensation  Currently off ggt  --> If requiring additional Afib control, would opt for Amiodarone instread  - Replete electrolytes PRN with Mg/Phos/K goals of 2/3/4 respectively  - GDMT as tolerated once renal function improves (Entresto)  - Daily Weights and I/O's Q6H  - Start Lasix 40mg PO Daily  - Can consider repeating TTE prior to discharger or in outpatient setting to evaluate improvement in EF now that rates are better controlled    Thank you for involving us in the care of your patient        Evaristo Jose, MD  Cardiology      [facility]/ [facility] Everywhere records reviewed:     ** Please Note: Fluency DirectDictation voice to text software may have been used in the creation of this document   **

## 2022-10-15 NOTE — PROGRESS NOTES
[address]  Progress Note [place] Beam 1935, 80 y o  male MRN: [id number]  Unit/Bed#: ICU 05-01 Encounter: [id number]  Primary Care Provider: Juanito Casper DO   Date and time admitted to hospital: 10/13/2022 10:51 AM    * Atrial fibrillation with RVR ([facility] )  Assessment & Plan  · History of PAF with RANJITH and CVN in 2015  · Atrial fibrillation with HR better controlled  · [id number]: 6-7   · Diltiazem gtt discontinued with HR between   · Plan to increase Toprol to 200mg daily tomorrow  · If further rate control is needed, Amiodarone recommended given reduced EF  · Continue Eliquis to 2 5mg BID given renal impairment  · Cardiology following    Acute systolic congestive heart failure [facility])  Assessment & Plan  Wt Readings from Last 3 Encounters:   10/15/22 112 kg (247 lb 12 8 oz)   07/06/18 122 kg (270 lb)     · History of anterior MI  · BNP: 1272  · CXR (10/13): Moderate pulmonary edema, moderate right and small left pleural effusions  · TTE (10/14): LVEF 10-15%  There is severe global hypokinesis  Unable to assess diastolic function due to atrial fibrillation  Right ventricular cavity size is mildly dilated  The left atrium is moderately dilated  The right atrium is moderately dilated    · Continue Toprol as above; Hold ACE-I given DANIEL (resume when appropriate)  · De-escalate Lasix to 40mg PO daily  · Monitor Is and Os and daily weights; Fluid and Na restricted diet  · Cardiology following        DANIEL (acute kidney injury) ([facility] )  Assessment & Plan  · Creatinine has increased to 1 88 this AM  · This is in the setting of rapid atrial fibrillation and acute systolic heart failure with severely reduced EF  · Continue to hold home Lisinopril  · Lasix de-escalated to PO as above  · Nephrology following     CAD (coronary artery disease), native coronary artery  Assessment & Plan  · Continue ASA 81mg, Toprol as above, and substitute Pravastatin for home Simvastatin     History of CVA (cerebrovascular accident)  Assessment & Plan  · 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  · Continue ASA, Eliquis, and Pravastatin as above    Chronic respiratory failure with hypoxia (HCC)  Assessment & Plan  · Patient notes that he is on 3L NC QHS chronically  · Continue to monitor respiratory status/oxygen requirements while admitted  · Management for CHF as documented above       VTE Pharmacologic Prophylaxis: VTE Score: 4 Moderate Risk (Score 3-4) - Pharmacological DVT Prophylaxis Ordered: apixaban (Eliquis)  Patient Centered Rounds: I performed bedside rounds with nursing staff today  Discussions with Specialists or Other Care Team Provider: Cardiology, Nephrology, Nursing    Education and Discussions with Family / Patient: Updated  (son) via phone  Time Spent for Care: 30 minutes  More than 50% of total time spent on counseling and coordination of care as described above  Current Length of Stay: 2 day(s)  Current Patient Status: Inpatient   Certification Statement: The patient will continue to require additional inpatient hospital stay due to atrial fibrillation with RVR and acute decompensated HF  Discharge Plan: Anticipate discharge in 24-48 hrs to home  Code Status: Level 1 - Full Code    Subjective:   Patient resting comfortably in bed  He reports significant improvement in his symptoms since admission  No significant over night events reported by nursing  Objective:     Vitals:   Temp (24hrs), Av 4 °F (36 3 °C), Min:97 1 °F (36 2 °C), Max:97 9 °F (36 6 °C)    Temp:  [97 1 °F (36 2 °C)-97 9 °F (36 6 °C)] 97 1 °F (36 2 °C)  HR:  [] 95  Resp:  [17-36] 21  BP: ()/() 115/54  SpO2:  [90 %-97 %] 97 %  Body mass index is 33 61 kg/m²  Input and Output Summary (last 24 hours):      Intake/Output Summary (Last 24 hours) at 10/15/2022 1528  Last data filed at 10/15/2022 1200  Gross per 24 hour   Intake 927 21 ml   Output 1500 ml   Net -572 79 ml Physical Exam:   Physical Exam  Vitals and nursing note reviewed  Constitutional:       Appearance: Normal appearance  He is obese  HENT:      Head: Normocephalic and atraumatic  Mouth/Throat:      Mouth: Mucous membranes are moist       Pharynx: Oropharynx is clear  Eyes:      Extraocular Movements: Extraocular movements intact  Conjunctiva/sclera: Conjunctivae normal    Cardiovascular:      Rate and Rhythm: Tachycardia present  Rhythm irregular  Pulses: Normal pulses  Heart sounds: Normal heart sounds  Pulmonary:      Effort: Pulmonary effort is normal       Breath sounds: Normal breath sounds  No wheezing  Comments: 3L NC  Abdominal:      General: Bowel sounds are normal  There is no distension  Palpations: Abdomen is soft  Tenderness: There is no abdominal tenderness  Musculoskeletal:         General: Normal range of motion  Cervical back: Normal range of motion and neck supple  Skin:     General: Skin is warm and dry  Neurological:      General: No focal deficit present  Mental Status: He is alert and oriented to person, place, and time  Psychiatric:         Mood and Affect: Mood normal          Behavior: Behavior normal         Labs:  Results from last 7 days   Lab Units 10/15/22  0604 10/14/22  0514 10/13/22  1133   WBC Thousand/uL 8 52   < > 8 78   HEMOGLOBIN g/dL 14 8   < > 15 2   HEMATOCRIT % 47 4   < > 48 8   PLATELETS Thousands/uL 219   < > 235   NEUTROS PCT %  --   --  78*   LYMPHS PCT %  --   --  12*   MONOS PCT %  --   --  8   EOS PCT %  --   --  1    < > = values in this interval not displayed       Results from last 7 days   Lab Units 10/15/22  0604 10/14/22  0514 10/13/22  1133   SODIUM mmol/L 145   < > 144   POTASSIUM mmol/L 3 4*   < > 3 9   CHLORIDE mmol/L 103   < > 105   CO2 mmol/L 33*   < > 28   BUN mg/dL 41*   < > 28*   CREATININE mg/dL 1 88*   < > 1 29   ANION GAP mmol/L 9   < > 11   CALCIUM mg/dL 9 2   < > 9 3   ALBUMIN g/dL --   --  3 7   TOTAL BILIRUBIN mg/dL  --   --  1 57*   ALK PHOS U/L  --   --  67   ALT U/L  --   --  136*   AST U/L  --   --  97*   GLUCOSE RANDOM mg/dL 109   < > 137    < > = values in this interval not displayed  Results from last 7 days   Lab Units 10/13/22  1133   INR  1 32*             Results from last 7 days   Lab Units 10/13/22  1644 10/13/22  1416 10/13/22  1133   LACTIC ACID mmol/L 0 9 2 4*  --    PROCALCITONIN ng/ml  --   --  <0 05       Lines/Drains:  Invasive Devices  Report    Peripheral Intravenous Line  Duration           Peripheral IV 10/13/22 Left Hand 2 days                  Telemetry:  Telemetry Orders (From admission, onward)             48 Hour Telemetry Monitoring  Continuous x 48 hours        References:    Telemetry Guidelines   Question:  Reason for 48 Hour Telemetry  Answer:  Arrhythmias Requiring Medical Therapy (eg  SVT, Vtach/fib, Bradycardia, Uncontrolled A-fib)                 Telemetry Reviewed: Atrial fibrillation  HR averaging   Indication for Continued Telemetry Use: Arrthymias requiring medical therapy      Imaging: No new imaging       Recent Cultures (last 7 days):   Results from last 7 days   Lab Units 10/13/22  1144 10/13/22  1133   BLOOD CULTURE  No Growth at 24 hrs   --    GRAM STAIN RESULT   --  Gram positive cocci in clusters*       Last 24 Hours Medication List:   Current Facility-Administered Medications   Medication Dose Route Frequency Provider Last Rate   • acetaminophen  650 mg Oral Q4H PRN [facility], DO     • apixaban  2 5 mg Oral BID Grisel Alberto, DO     • aspirin  81 mg Oral Daily Grisel Alberto, DO     • folic acid-pyridoxine-cyanocobalamin  1 tablet Oral Daily [facility] Yandy Burch, DO     • [START ON 10/16/2022] furosemide  40 mg Oral Daily [facility] Yandy Burch DO     • [START ON 10/16/2022] metoprolol succinate  200 mg Oral Daily [facility] Risa Witt, DO     • ondansetron  4 mg Intravenous Q6H PRN [facility], DO     • pantoprazole  40 mg Oral Early Morning Tami Stuart Witt, DO     • pravastatin  80 mg Oral Daily With [address], DO     • QUEtiapine  50 mg Oral HS [address] [facility], DO     • sertraline  100 mg Oral Daily [address] [facility], DO          Today, Patient Was Seen By: [address] [facility]    **Please Note: This note may have been constructed using a voice recognition system  **

## 2022-10-15 NOTE — PROGRESS NOTES
Progress Note - Nephrology   Comfort Weldon 80 y o  male MRN: [id number]  Unit/Bed#: ICU 05-01 Encounter: [id number]    A/P:  1  Acute kidney injury on top kidney disease   Creatinine continues to increase, this is permissive due to the patient's need for aggressive diuresis in setting of acute congestive heart failure  Agree with deescalation of diuretics at this time according to Cardiology recommendations  Continue avoid potential nephrotoxins and work on optimize the patient's overall hemodynamics  2  Chronic kidney disease stage 2 with baseline creatinine between 0 9-1 1 mg/dL  3  Hypokalemia   Patient provided with supplements, continue monitor potassium and magnesium levels and offer additional supplementation as indicated  4  Hypomagnesemia   Continue to monitor magnesium levels in at supplementation as indicated  5  Borderline hypernatremia   Patient is very close to being dehydrated in the setting of volume overload  Will discontinue fluid restriction given the clinical circumstances as this is no longer necessary or indicated when there is no hyponatremia  6  Acute on chronic systolic congestive heart failure with ejection fraction of 10-15%   As mentioned above, patient to have deescalation diuretics, continue to monitor, continue with low-sodium diet  7  Hypertension setting of chronic kidney disease   Blood pressures have been appropriate, continue current medications      Follow up reason for today's visit:  Acute kidney injury/chronic kidney disease/multiple electrolyte abnormalities    Atrial fibrillation with RVR [facility])    Patient Active Problem List   Diagnosis   • Atrial fibrillation with RVR (HCC)   • Acute systolic congestive heart failure (HCC)   • CAD (coronary artery disease), native coronary artery   • History of CVA (cerebrovascular accident)   • Benign essential HTN   • Hallucinations   • Chronic respiratory failure with hypoxia ([facility] [address] )   • DANIEL (acute kidney injury) ([facility] [address] ) Subjective:   Patient feeling significantly improved over last 24 hours  He is eating drinking appropriately no nausea vomiting, breathing is    Objective:     Vitals: Blood pressure 115/54, pulse 95, temperature (!) 97 1 °F (36 2 °C), temperature source Tympanic, resp  rate 21, height 6' (1 829 m), weight 112 kg (247 lb 12 8 oz), SpO2 97 %  ,Body mass index is 33 61 kg/m²  Weight (last 2 days)     Date/Time Weight    10/15/22 0600 112 (247 8)     Weight: everything off bed at 10/15/22 0600    10/14/22 0736 113 (249)    10/14/22 0515 113 (249 12)    10/13/22 1520 112 (247 8)    10/13/22 1046 111 (245)            Intake/Output Summary (Last 24 hours) at 10/15/2022 1205  Last data filed at 10/15/2022 0624  Gross per 24 hour   Intake 492 21 ml   Output 1500 ml   Net -1007 79 ml     I/O last 3 completed shifts: In: 1009 [P O :918;  I V :91]  Out: 1810 [Urine:1810]         Physical Exam: /54   Pulse 95   Temp (!) 97 1 °F (36 2 °C) (Tympanic)   Resp 21   Ht 6' (1 829 m)   Wt 112 kg (247 lb 12 8 oz) Comment: everything off bed  SpO2 97%   BMI 33 61 kg/m²     General Appearance:    Alert, cooperative, no distress, appears stated age   Head:    Normocephalic, without obvious abnormality, atraumatic   Eyes:    Conjunctiva/corneas clear   Ears:    Normal external ears   Nose:   Nares normal, septum midline, mucosa normal, no drainage    or sinus tenderness   Throat:   Lips, mucosa, and tongue normal; teeth and gums normal   Neck:   Supple   Back:     Symmetric, no curvature, ROM normal, no CVA tenderness   Lungs:     Reduced bilaterally but otherwise   Chest wall:    No tenderness or deformity   Heart:    Regular rate and rhythm, S1 and S2 normal, no murmur, rub   or gallop   Abdomen:     Soft, non-tender, bowel sounds active   Extremities:   Extremities normal, atraumatic, no cyanosis, mild bilateral lower extremity edema   Skin:   Skin color, texture, turgor normal, no rashes or lesions   Lymph nodes: Cervical normal   Neurologic:   CNII-XII intact            Lab, Imaging and other studies: I have personally reviewed pertinent labs  CBC:   Lab Results   Component Value Date    WBC 8 52 10/15/2022    HGB 14 8 10/15/2022    HCT 47 4 10/15/2022     (H) 10/15/2022     10/15/2022    MCH 31 4 10/15/2022    MCHC 31 2 (L) 10/15/2022    RDW 14 8 10/15/2022    MPV 10 3 10/15/2022     CMP:   Lab Results   Component Value Date    K 3 4 (L) 10/15/2022     10/15/2022    CO2 33 (H) 10/15/2022    BUN 41 (H) 10/15/2022    CREATININE 1 88 (H) 10/15/2022    CALCIUM 9 2 10/15/2022    EGFR 31 10/15/2022         Results from last 7 days   Lab Units 10/15/22  0604 10/14/22  0514 10/13/22  1133   POTASSIUM mmol/L 3 4* 4 0 3 9   CHLORIDE mmol/L 103 105 105   CO2 mmol/L 33* 26 28   BUN mg/dL 41* 33* 28*   CREATININE mg/dL 1 88* 1 60* 1 29   CALCIUM mg/dL 9 2 9 1 9 3   ALK PHOS U/L  --   --  67   ALT U/L  --   --  136*   AST U/L  --   --  97*         Phosphorus: No results found for: PHOS  Magnesium:   Lab Results   Component Value Date    MG 2 0 10/15/2022     Urinalysis: No results found for: COLORU, CLARITYU, SPECGRAV, PHUR, LEUKOCYTESUR, NITRITE, PROTEINUA, GLUCOSEU, KETONESU, BILIRUBINUR, BLOODU  Ionized Calcium: No results found for: CAION  Coagulation: No results found for: PT, INR, APTT  Troponin: No results found for: TROPONINI  ABG: No results found for: PHART, ZRT2GIZ, PO2ART, FMV0WTT, U2FZWZIQ, BEART, SOURCE  Radiology review:     IMAGING  Procedure: XR chest 1 view portable    Result Date: 10/13/2022  Narrative: CHEST INDICATION:   weakness  COMPARISON:  None EXAM PERFORMED/VIEWS:  XR CHEST PORTABLE FINDINGS: Heart at upper limit of normal in size  Moderate pulmonary edema with moderate right and small left effusion  No pneumothorax  Osseous structures appear within normal limits for patient age  Impression: Moderate pulmonary edema with moderate right and small left effusion   Workstation performed: [id number]     Procedure: Echo complete    Result Date: 10/14/2022  Narrative: •  Left Ventricle: Left ventricular cavity size is dilated  Wall thickness is normal  The left ventricular ejection fraction is 10-15%  Systolic function is severely reduced  There is severe global hypokinesis  Unable to assess diastolic function due to atrial fibrillation  •  Right Ventricle: Right ventricular cavity size is mildly dilated  Systolic function is reduced  Abnormal tricuspid annular plane systolic excursion (TAPSE) < 1 7 cm  •  Left Atrium: The atrium is moderately dilated  •  Right Atrium: The atrium is moderately dilated  •  Aortic Valve: The leaflets are thickened  The leaflets are moderately calcified  There is moderately reduced mobility  There is mild stenosis however may be underestimated in the setting of significantly reduced left ventricular function  •  Mitral Valve: There is annular calcification  There is mild regurgitation  •  Tricuspid Valve: There is mild regurgitation  •  Pulmonic Valve: There is mild regurgitation         Current Facility-Administered Medications   Medication Dose Route Frequency   • acetaminophen (TYLENOL) tablet 650 mg  650 mg Oral Q4H PRN   • apixaban (ELIQUIS) tablet 2 5 mg  2 5 mg Oral BID   • aspirin (ECOTRIN LOW STRENGTH) EC tablet 81 mg  81 mg Oral Daily   • diltiazem (CARDIZEM) 125 mg in sodium chloride 0 9 % 125 mL infusion  1-15 mg/hr Intravenous Titrated   • folic acid-pyridoxine-cyanocobalamin 2 5-25-2 mg per tablet 1 tablet  1 tablet Oral Daily   • furosemide (LASIX) injection 40 mg  40 mg Intravenous BID (diuretic)   • metoprolol succinate (TOPROL-XL) 24 hr tablet 100 mg  100 mg Oral Daily   • ondansetron (ZOFRAN) injection 4 mg  4 mg Intravenous Q6H PRN   • pantoprazole (PROTONIX) EC tablet 40 mg  40 mg Oral Early Morning   • pravastatin (PRAVACHOL) tablet 80 mg  80 mg Oral Daily With Dinner   • QUEtiapine (SEROquel) tablet 50 mg  50 mg Oral HS   • sertraline (ZOLOFT) tablet 100 mg  100 mg Oral Daily     Medications Discontinued During This Encounter   Medication Reason   • metoprolol (LOPRESSOR) injection 5 mg    • metoprolol (LOPRESSOR) injection 5 mg    • lisinopril (ZESTRIL) tablet 40 mg    • apixaban (ELIQUIS) tablet 5 mg    • aspirin-dipyridamole (AGGRENOX)  mg per 12 hr capsule 1 capsule    • furosemide (LASIX) injection 40 mg        Gareth Rae      This progress note was produced in part using a dictation device which may document imprecise wording from author's original intent

## 2022-10-15 NOTE — ASSESSMENT & PLAN NOTE
Wt Readings from Last 3 Encounters:   10/15/22 112 kg (247 lb 12 8 oz)   07/06/18 122 kg (270 lb)     · History of anterior MI  · BNP: 1272  · CXR (10/13): Moderate pulmonary edema, moderate right and small left pleural effusions  · TTE (10/14): LVEF 10-15%  There is severe global hypokinesis  Unable to assess diastolic function due to atrial fibrillation  Right ventricular cavity size is mildly dilated  The left atrium is moderately dilated  The right atrium is moderately dilated    · Continue Toprol as above; Hold ACE-I given DANIEL (resume when appropriate)  · De-escalate Lasix to 40mg PO daily  · Monitor Is and Os and daily weights; Fluid and Na restricted diet  · Cardiology following

## 2022-10-15 NOTE — ASSESSMENT & PLAN NOTE
· History of PAF with RANJITH and CVN in 2015  · Atrial fibrillation with HR better controlled  · [id number]: 6-7   · Diltiazem gtt discontinued with HR between   · Plan to increase Toprol to 200mg daily tomorrow  · If further rate control is needed, Amiodarone recommended given reduced EF  · Continue Eliquis to 2 5mg BID given renal impairment  · Cardiology following

## 2022-10-15 NOTE — PLAN OF CARE
Problem: MOBILITY - ADULT  Goal: Maintain or return to baseline ADL function  Description: INTERVENTIONS:  -  Assess patient's ability to carry out ADLs; assess patient's baseline for ADL function and identify physical deficits which impact ability to perform ADLs (bathing, care of mouth/teeth, toileting, grooming, dressing, etc )  - Assess/evaluate cause of self-care deficits   - Assess range of motion  - Assess patient's mobility; develop plan if impaired  - Assess patient's need for assistive devices and provide as appropriate  - Encourage maximum independence but intervene and supervise when necessary  - Involve family in performance of ADLs  - Assess for home care needs following discharge   - Consider OT consult to assist with ADL evaluation and planning for discharge  - Provide patient education as appropriate  Outcome: Not Progressing     Problem: INFECTION - ADULT  Goal: Absence or prevention of progression during hospitalization  Description: INTERVENTIONS:  - Assess and monitor for signs and symptoms of infection  - Monitor lab/diagnostic results  - Monitor all insertion sites, i e  indwelling lines, tubes, and drains  - Monitor endotracheal if appropriate and nasal secretions for changes in amount and color  - [place] appropriate cooling/warming therapies per order  - Administer medications as ordered  - Instruct and encourage patient and family to use good hand hygiene technique  - Identify and instruct in appropriate isolation precautions for identified infection/condition  Outcome: Not Progressing

## 2022-10-16 ENCOUNTER — HOME HEALTH ADMISSION (OUTPATIENT)
Dept: HOME HEALTH SERVICES | Facility: HOME HEALTHCARE | Age: 87
End: 2022-10-16

## 2022-10-16 PROBLEM — R78.81 BACTEREMIA: Status: ACTIVE | Noted: 2022-10-16

## 2022-10-16 LAB
ANION GAP SERPL CALCULATED.3IONS-SCNC: 6 MMOL/L (ref 4–13)
BUN SERPL-MCNC: 39 MG/DL (ref 5–25)
CALCIUM SERPL-MCNC: 9.3 MG/DL (ref 8.4–10.2)
CHLORIDE SERPL-SCNC: 104 MMOL/L (ref 96–108)
CO2 SERPL-SCNC: 37 MMOL/L (ref 21–32)
CREAT SERPL-MCNC: 1.83 MG/DL (ref 0.6–1.3)
ERYTHROCYTE [DISTWIDTH] IN BLOOD BY AUTOMATED COUNT: 15.1 % (ref 11.6–15.1)
GFR SERPL CREATININE-BSD FRML MDRD: 32 ML/MIN/1.73SQ M
GLUCOSE SERPL-MCNC: 104 MG/DL (ref 65–140)
HCT VFR BLD AUTO: 47.2 % (ref 36.5–49.3)
HGB BLD-MCNC: 14.5 G/DL (ref 12–17)
MCH RBC QN AUTO: 31.6 PG (ref 26.8–34.3)
MCHC RBC AUTO-ENTMCNC: 30.7 G/DL (ref 31.4–37.4)
MCV RBC AUTO: 103 FL (ref 82–98)
PLATELET # BLD AUTO: 214 THOUSANDS/UL (ref 149–390)
PMV BLD AUTO: 10.1 FL (ref 8.9–12.7)
POTASSIUM SERPL-SCNC: 3.5 MMOL/L (ref 3.5–5.3)
RBC # BLD AUTO: 4.59 MILLION/UL (ref 3.88–5.62)
SODIUM SERPL-SCNC: 147 MMOL/L (ref 135–147)
WBC # BLD AUTO: 7.06 THOUSAND/UL (ref 4.31–10.16)

## 2022-10-16 PROCEDURE — 99232 SBSQ HOSP IP/OBS MODERATE 35: CPT | Performed by: INTERNAL MEDICINE

## 2022-10-16 PROCEDURE — 97162 PT EVAL MOD COMPLEX 30 MIN: CPT

## 2022-10-16 PROCEDURE — 99232 SBSQ HOSP IP/OBS MODERATE 35: CPT | Performed by: STUDENT IN AN ORGANIZED HEALTH CARE EDUCATION/TRAINING PROGRAM

## 2022-10-16 PROCEDURE — 99233 SBSQ HOSP IP/OBS HIGH 50: CPT | Performed by: INTERNAL MEDICINE

## 2022-10-16 PROCEDURE — 80048 BASIC METABOLIC PNL TOTAL CA: CPT | Performed by: INTERNAL MEDICINE

## 2022-10-16 PROCEDURE — 85027 COMPLETE CBC AUTOMATED: CPT | Performed by: INTERNAL MEDICINE

## 2022-10-16 RX ORDER — DIGOXIN 0.25 MG/ML
250 INJECTION INTRAMUSCULAR; INTRAVENOUS ONCE
Status: DISCONTINUED | OUTPATIENT
Start: 2022-10-16 | End: 2022-10-16

## 2022-10-16 RX ORDER — METOPROLOL SUCCINATE 50 MG/1
100 TABLET, EXTENDED RELEASE ORAL DAILY
Status: DISCONTINUED | OUTPATIENT
Start: 2022-10-17 | End: 2022-10-17 | Stop reason: HOSPADM

## 2022-10-16 RX ORDER — METOPROLOL SUCCINATE 200 MG/1
200 TABLET, EXTENDED RELEASE ORAL DAILY
Qty: 30 TABLET | Refills: 0 | Status: SHIPPED | OUTPATIENT
Start: 2022-10-17 | End: 2022-10-16

## 2022-10-16 RX ORDER — POTASSIUM CHLORIDE 20 MEQ/1
40 TABLET, EXTENDED RELEASE ORAL ONCE
Status: COMPLETED | OUTPATIENT
Start: 2022-10-16 | End: 2022-10-16

## 2022-10-16 RX ORDER — ASPIRIN 81 MG/1
81 TABLET ORAL DAILY
Qty: 30 TABLET | Refills: 0 | Status: SHIPPED | OUTPATIENT
Start: 2022-10-17 | End: 2022-11-16

## 2022-10-16 RX ORDER — DIGOXIN 0.25 MG/ML
250 INJECTION INTRAMUSCULAR; INTRAVENOUS EVERY 6 HOURS
Status: COMPLETED | OUTPATIENT
Start: 2022-10-16 | End: 2022-10-17

## 2022-10-16 RX ORDER — FUROSEMIDE 40 MG/1
40 TABLET ORAL DAILY
Qty: 30 TABLET | Refills: 0 | Status: SHIPPED | OUTPATIENT
Start: 2022-10-17 | End: 2022-11-16

## 2022-10-16 RX ADMIN — APIXABAN 2.5 MG: 5 TABLET, FILM COATED ORAL at 17:48

## 2022-10-16 RX ADMIN — DIGOXIN 250 MCG: 0.25 INJECTION INTRAMUSCULAR; INTRAVENOUS at 15:00

## 2022-10-16 RX ADMIN — FOLIC ACID-PYRIDOXINE-CYANOCOBALAMIN TAB 2.5-25-2 MG 1 TABLET: 2.5-25-2 TAB at 08:36

## 2022-10-16 RX ADMIN — ASPIRIN 81 MG: 81 TABLET, COATED ORAL at 08:31

## 2022-10-16 RX ADMIN — QUETIAPINE FUMARATE 50 MG: 25 TABLET ORAL at 21:22

## 2022-10-16 RX ADMIN — SERTRALINE HYDROCHLORIDE 100 MG: 50 TABLET ORAL at 08:31

## 2022-10-16 RX ADMIN — METOPROLOL SUCCINATE 200 MG: 50 TABLET, EXTENDED RELEASE ORAL at 08:31

## 2022-10-16 RX ADMIN — PRAVASTATIN SODIUM 80 MG: 40 TABLET ORAL at 17:48

## 2022-10-16 RX ADMIN — PANTOPRAZOLE SODIUM 40 MG: 40 TABLET, DELAYED RELEASE ORAL at 06:24

## 2022-10-16 RX ADMIN — POTASSIUM CHLORIDE 40 MEQ: 1500 TABLET, EXTENDED RELEASE ORAL at 08:31

## 2022-10-16 RX ADMIN — DIGOXIN 250 MCG: 0.25 INJECTION INTRAMUSCULAR; INTRAVENOUS at 21:15

## 2022-10-16 RX ADMIN — FUROSEMIDE 40 MG: 40 TABLET ORAL at 08:31

## 2022-10-16 RX ADMIN — APIXABAN 2.5 MG: 5 TABLET, FILM COATED ORAL at 08:31

## 2022-10-16 NOTE — ASSESSMENT & PLAN NOTE
· Creatinine stable at 1 83  · This is in the setting of rapid atrial fibrillation and acute systolic heart failure with severely reduced EF  · Continue to hold home Lisinopril  · Continue diuretic therapy as above  · Nephrology following

## 2022-10-16 NOTE — ASSESSMENT & PLAN NOTE
Wt Readings from Last 3 Encounters:   10/16/22 114 kg (252 lb 5 1 oz)   07/06/18 122 kg (270 lb)     · History of anterior MI  · BNP: 1272  · CXR (10/13): Moderate pulmonary edema, moderate right and small left pleural effusions  · TTE (10/14): LVEF 10-15%  There is severe global hypokinesis  Unable to assess diastolic function due to atrial fibrillation  Right ventricular cavity size is mildly dilated  The left atrium is moderately dilated  The right atrium is moderately dilated    · Continue Toprol as above; Hold ACE-I given DANIEL (resume when appropriate)  · Continue Lasix to 40mg PO daily  · Monitor Is and Os and daily weights; Fluid and Na restricted diet  · Cardiology following

## 2022-10-16 NOTE — PHYSICAL THERAPY NOTE
Physical Therapy Evaluation   Time in: 1324  Time out: 1348  Total evaluation time: 24 minutes    Patient's Name: Comfort Weldon    Admitting Diagnosis  Fatigue [R53 83]  Weakness [R53 1]  Acute exacerbation of CHF (congestive heart failure) ([facility] [address] ) [I50 9]  Atrial fibrillation with rapid ventricular response ([facility] [address] ) [I48 91]    Problem List  Patient Active Problem List   Diagnosis    Atrial fibrillation with RVR ([facility] [address] )    Acute systolic congestive heart failure ([facility] [address] )    CAD (coronary artery disease), native coronary artery    History of CVA (cerebrovascular accident)    Benign essential HTN    Hallucinations    Chronic respiratory failure with hypoxia ([facility] [address] )    DANIEL (acute kidney injury) ([facility] [address] )    Bacteremia       Past Medical History  History reviewed  No pertinent past medical history  Past Surgical History  History reviewed  No pertinent surgical history  PT performed at least 2 patient identifiers during session: Name and wristband  10/16/22 1329   PT Last Visit   PT Visit Date 10/16/22   Note Type   Note type Evaluation   Pain Assessment   Pain Assessment Tool 0-10   Pain Score No Pain   Restrictions/Precautions   Weight Bearing Precautions Per Order No   Other Precautions Chair Alarm;Telemetry; Fall Risk;Multiple lines;Hard of hearing;Visual impairment   Home Living   Type of [address] [place] [address] One level;Performs ADLs on one level; Able to live on main level with bedroom/bathroom;Stairs to enter without rails  (1 GEENA)   Bathroom Shower/Tub Tub/shower unit   Bathroom Toilet Standard   Bathroom Equipment Grab bars in shower   [address]  (Hurry cane)   Prior Function   Level of [place] Independent with ADLs   Lives With [facility] Help From Family   IADLs Family/Friend/Other provides transportation; Family/Friend/Other provides meals; Family/Friend/Other provides medication management   Falls in the last 6 months 1 to 4  (1 mechanical fall)   Vocational Retired   Comments pt does not drive   General   Family/Caregiver Present Yes  (2 sons)   Cognition   Arousal/Participation Alert   Orientation Level Oriented to person;Oriented to place  (did not assess time or situation)   Following Commands Follows one step commands without difficulty  (increased volume required)   Comments pt agreeable to PT session   Subjective   Subjective "I feel better using the walker"   RLE Assessment   RLE Assessment WFL  (at least 3+/5 throughout witnessed with OOB activity)   LLE Assessment   LLE Assessment WFL  (at least 3+/5 throughout witnessed with OOB activity)   Vision-Basic Assessment   Current Vision   (pt's son reports pt not wearing glasses presently)   Visual History Cataracts  (L eye cataract surgery completed, R eye is scheduled for catarct surgery)   Coordination   Movements are Fluid and Coordinated 1   Sensation WFL   Bed Mobility   Supine to Sit Unable to assess  (DNT as pt seated OOB in chair upon arrival)   Additional Comments HR at rest on tele prior to mobility =  bpm; SpO2 on RA = 94-95%   Transfers   Sit to Stand 5  Supervision   Additional items Assist x 1; Armrests; Increased time required   Stand to Sit 5  Supervision   Additional items Assist x 1; Armrests   Toilet transfer 5  Supervision   Additional items Standard toilet; Increased time required  (+ grab bar usage on L, pt endorses countertop support could be used at home)   Ambulation/Elevation   Gait pattern Narrow COSME; Foward flexed;Knees flexed; Step through pattern   Gait Assistance 5  Supervision   Additional items Assist x 1   Assistive Device Rolling walker;SPC   Distance 110 ft c RW; 20 ft c SPC; remainder ~50 ft with RW usage  Pt demonstrated and also verbalized improved safety and comfort with level surface gt using RW  Pt encouraged to use RW upon return to home environment  PT endorses and understands RW at this time     Stair Management Assistance Not tested   Ambulation/Elevation Additional Comments Pt denies any lightheadedness, dizziness, chest pain or pressure during and following all activity; BP following ambulation 106/55, +   Balance   Static Sitting Good   Dynamic Sitting Fair +   Static Standing Fair   Dynamic Standing Fair -   Ambulatory Fair -  (improved dynamic and ambulatory balance noted with use of RW over [facility])   Endurance Deficit   Endurance Deficit No   Activity Tolerance   Activity Tolerance Patient tolerated treatment well;Treatment limited secondary to medical complications (Comment)  (increased HR)   Lillian Gomez made aware of outcomes/recs   Nurse Made Aware Yes, ANA Morrison verbalized pt appropriate for PT session, made aware of outcomes/recs   Assessment   Prognosis Good   Problem List Impaired balance;Decreased mobility; Impaired judgement;Decreased strength; Impaired vision; Impaired hearing   Assessment Pt is 80 y o  male seen for moderate-complexity PT evaluation on 10/16/2022 s/p admit to [facility] on 10/13/2022 w/ Atrial fibrillation with RVR ([address] )  PT was consulted to assess pt's functional mobility and d/c needs  Order placed for PT eval and tx, w/ up as tolerated order  PTA, pt lives c son in 1 SH c 1 GEENA, amb c cane at baseline, (I) ADL & mobility performance, support from son who lives local c med management  At time of eval, pt performs transfers from multiple surfaces at SUP level, amb x household distances c SUP, improved gt sequencing and safety using RW  Upon evaluation, pt presenting with impaired functional mobility d/t decreased strength, impaired balance, decreased mobility, impaired judgement, impaired vision and impaired hearing  Pertinent PMHx and current co-morbidities affecting pt's physical performance at time of assessment include: CHF, DANIEL, CAD, h/o CVA, chronic respiratory failure c hypoxia, AFib  Personal factors affecting pt at time of eval include: increased age   The following objective measures performed on IE also reveal limitations: AM-PAC 6-Clicks: 99/70  Pt's clinical presentation is currently evolving seen in pt's presentation of ongoing medical assessment and improving medical status since admission & HR improvement following administration of medications  Overall, pt's rehab potential and prognosis to return to PLOF is good as impacted by objective findings, warranting pt to receive further skilled PT interventions to address identified impairments, activity limitation(s), and participation restriction(s)  Goal for patient is to return home  Pt to benefit from continued PT tx to address deficits as defined above and maximize level of functional independent mobility and consistency in order for pt to prevent future falls and ensure safety in home environment  From PT/mobility standpoint, recommendation at time of d/c would be home with home health rehabilitation pending progress in order to facilitate return to PLOF  Barriers to Discharge None  (pt has good support from son in community)   Goals   Patient Goals "I would like to go home"   [facility] Expiration Date 10/26/22   Short Term Goal #1 In 7-10 days: Increase bilateral LE strength 1/2 grade to facilitate independent mobility, Perform all transfers modified independent to improve independence, Ambulate > 150 ft  with least restrictive assistive device modified independent w/o LOB and w/ normalized gait pattern 100% of the time, Navigate 1-2 stairs modified independent without handrail to facilitate return to previous living environment, Increase all balance 1/2 grade to decrease risk for falls and Complete exercise program independently   PT Treatment Day 0   Plan   Treatment/Interventions Functional transfer training;LE strengthening/ROM; Elevations; Therapeutic exercise;Patient/family training;Gait training;Spoke to nursing;Equipment eval/education;Spoke to MD   PT Frequency 3-5x/wk   Recommendation   PT Discharge Recommendation Home with home health rehabilitation   Equipment Recommended Eladio Duvall; Other (Comment)  ([facility])   Walker Package Recommended Wheeled walker   Change/add to Jogli? No   Additional Comments Pt's raw score on the AM-PAC Basic Mobility inpatient short form is 20, standardized score is 43 99  Patients at this level are likely to benefit from DC to home with no services, however, please refer to therapist recommendation for safe DC planning  AM-PAC Basic Mobility Inpatient   Turning in Bed Without Bedrails 4   Lying on Back to Sitting on Edge of Flat Bed 4   Moving Bed to Chair 3   Standing Up From Chair 3   Walk in Room 3   Climb 3-5 Stairs 3   Basic Mobility Inpatient Raw Score 20   Basic Mobility Standardized Score 43 99   Highest Level Of Mobility   JH-HLM Goal 6: Walk 10 steps or more   JH-HLM Achieved 7: Walk 25 feet or more   End of Consult   Patient Position at End of Consult Bedside chair; All needs within reach       Alec Saldana, PT, DPT

## 2022-10-16 NOTE — PROGRESS NOTES
[address]  Progress Note Odalis Spotted 1935, 80 y o  male MRN: [id number]  Unit/Bed#: ICU 05-01 Encounter: [id number]  Primary Care Provider: Senait Hodge DO   Date and time admitted to hospital: 10/13/2022 10:51 AM    * Atrial fibrillation with RVR ([facility] )  Assessment & Plan  · History of PAF with RANJITH and CVN in 2015  · Atrial fibrillation with HR better controlled  · [id number]: 6-7   · After further discussion with Cardiology, will decrease Toprol to 100mg daily and load with Digoxin 250mcg IV x4 doses then plan to transition to 125mcg daily   · Continue Eliquis to 2 5mg BID given renal impairment  · Cardiology following    Acute systolic congestive heart failure ([facility] )  Assessment & Plan  Wt Readings from Last 3 Encounters:   10/16/22 114 kg (252 lb 5 1 oz)   07/06/18 122 kg (270 lb)     · History of anterior MI  · BNP: 1272  · CXR (10/13): Moderate pulmonary edema, moderate right and small left pleural effusions  · TTE (10/14): LVEF 10-15%  There is severe global hypokinesis  Unable to assess diastolic function due to atrial fibrillation  Right ventricular cavity size is mildly dilated  The left atrium is moderately dilated  The right atrium is moderately dilated    · Continue Toprol as above; Hold ACE-I given DANIEL (resume when appropriate)  · Continue Lasix to 40mg PO daily  · Monitor Is and Os and daily weights; Fluid and Na restricted diet  · Cardiology following    Bacteremia  Assessment & Plan  · BClx (10/13): 1/2 sets positive for coagulase negative staph  · Likely a contaminant; no abx therapy is indicated at this time    DANIEL (acute kidney injury) ([facility] )  Assessment & Plan  · Creatinine stable at 1 83  · This is in the setting of rapid atrial fibrillation and acute systolic heart failure with severely reduced EF  · Continue to hold home Lisinopril  · Continue diuretic therapy as above  · Nephrology following     CAD (coronary artery disease), native coronary artery  Assessment & Plan  · Continue ASA 81mg, Toprol as above, and substitute Pravastatin for home Simvastatin     History of CVA (cerebrovascular accident)  Assessment & Plan  · 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  · Continue ASA, Eliquis, and Pravastatin as above    Chronic respiratory failure with hypoxia (HCC)  Assessment & Plan  · Patient notes that he is on 3L NC QHS chronically  · Continue to monitor respiratory status/oxygen requirements while admitted  · Management for CHF as documented above       VTE Pharmacologic Prophylaxis: VTE Score: 4 Moderate Risk (Score 3-4) - Pharmacological DVT Prophylaxis Ordered: apixaban (Eliquis)  Patient Centered Rounds: I performed bedside rounds with nursing staff today  Discussions with Specialists or Other Care Team Provider: Cardiology, Nephrology, PT/T, Nursing, and CM    Education and Discussions with Family / Patient: Updated  (son) at bedside  Time Spent for Care: 45 minutes  More than 50% of total time spent on counseling and coordination of care as described above  Current Length of Stay: 3 day(s)  Current Patient Status: Inpatient   Certification Statement: The patient will continue to require additional inpatient hospital stay due to awaiting [facility] set-up and titrating medications for atrial fibrillation  Discharge Plan: Anticipate discharge tomorrow to home with home services  Code Status: Level 1 - Full Code    Subjective:   Patient sitting up in bedside chair without any acute complaints  I personally ambulated him in the room with rolling walker and he did fairly well  Nursing additionally ambulated the patient on the winkler with rolling walker and he did 170 ft without significant weakness or instability      Objective:     Vitals:   Temp (24hrs), Av °F (36 1 °C), Min:96 6 °F (35 9 °C), Max:97 3 °F (36 3 °C)    Temp:  [96 6 °F (35 9 °C)-97 3 °F (36 3 °C)] 96 6 °F (35 9 °C)  HR:  [] 124  Resp:  [17-45] 33  BP: ()/(52-89) 119/75  SpO2:  [93 %-98 %] 97 %  Body mass index is 34 22 kg/m²  Input and Output Summary (last 24 hours): Intake/Output Summary (Last 24 hours) at 10/16/2022 1405  Last data filed at 10/16/2022 1301  Gross per 24 hour   Intake 375 ml   Output 625 ml   Net -250 ml       Physical Exam:   Physical Exam  Vitals and nursing note reviewed  Constitutional:       General: He is not in acute distress  Appearance: Normal appearance  He is obese  HENT:      Head: Normocephalic and atraumatic  Mouth/Throat:      Mouth: Mucous membranes are moist       Pharynx: Oropharynx is clear  Eyes:      Extraocular Movements: Extraocular movements intact  Conjunctiva/sclera: Conjunctivae normal    Cardiovascular:      Rate and Rhythm: Tachycardia present  Rhythm irregular  Pulses: Normal pulses  Heart sounds: Normal heart sounds  Pulmonary:      Effort: Pulmonary effort is normal  No respiratory distress  Breath sounds: Normal breath sounds  No wheezing  Abdominal:      General: Bowel sounds are normal  There is no distension  Palpations: Abdomen is soft  Tenderness: There is no abdominal tenderness  Musculoskeletal:         General: Normal range of motion  Cervical back: Normal range of motion and neck supple  Skin:     General: Skin is warm and dry  Neurological:      General: No focal deficit present  Mental Status: He is alert and oriented to person, place, and time  Mental status is at baseline     Psychiatric:         Mood and Affect: Mood normal          Behavior: Behavior normal          Judgment: Judgment normal           Labs:  Results from last 7 days   Lab Units 10/16/22  0500 10/14/22  0514 10/13/22  1133   WBC Thousand/uL 7 06   < > 8 78   HEMOGLOBIN g/dL 14 5   < > 15 2   HEMATOCRIT % 47 2   < > 48 8   PLATELETS Thousands/uL 214   < > 235   NEUTROS PCT %  --   --  78*   LYMPHS PCT %  --   --  12*   MONOS PCT %  --   --  8   EOS PCT %  --   --  1    < > = values in this interval not displayed  Results from last 7 days   Lab Units 10/16/22  0500 10/14/22  0514 10/13/22  1133   SODIUM mmol/L 147   < > 144   POTASSIUM mmol/L 3 5   < > 3 9   CHLORIDE mmol/L 104   < > 105   CO2 mmol/L 37*   < > 28   BUN mg/dL 39*   < > 28*   CREATININE mg/dL 1 83*   < > 1 29   ANION GAP mmol/L 6   < > 11   CALCIUM mg/dL 9 3   < > 9 3   ALBUMIN g/dL  --   --  3 7   TOTAL BILIRUBIN mg/dL  --   --  1 57*   ALK PHOS U/L  --   --  67   ALT U/L  --   --  136*   AST U/L  --   --  97*   GLUCOSE RANDOM mg/dL 104   < > 137    < > = values in this interval not displayed  Results from last 7 days   Lab Units 10/13/22  1133   INR  1 32*             Results from last 7 days   Lab Units 10/13/22  1644 10/13/22  1416 10/13/22  1133   LACTIC ACID mmol/L 0 9 2 4*  --    PROCALCITONIN ng/ml  --   --  <0 05       Lines/Drains:  Invasive Devices  Report    Peripheral Intravenous Line  Duration           Peripheral IV 10/13/22 Left Hand 3 days                  Telemetry:  Telemetry Orders (From admission, onward)             48 Hour Telemetry Monitoring  Continuous x 48 hours        References:    Telemetry Guidelines   Question:  Reason for 48 Hour Telemetry  Answer:  Arrhythmias Requiring Medical Therapy (eg  SVT, Vtach/fib, Bradycardia, Uncontrolled A-fib)                 Telemetry Reviewed: Atrial fibrillation  HR averaging   Indication for Continued Telemetry Use: Arrthymias requiring medical therapy      Imaging: No new imaging  Recent Cultures (last 7 days):   Results from last 7 days   Lab Units 10/13/22  1144 10/13/22  1133   BLOOD CULTURE  No Growth at 48 hrs   Staphylococcus coagulase negative*   GRAM STAIN RESULT   --  Gram positive cocci in clusters*       Last 24 Hours Medication List:   Current Facility-Administered Medications   Medication Dose Route Frequency Provider Last Rate   • acetaminophen  650 mg Oral Q4H PRN Maria T Mistry DO     • apixaban  2 5 mg Oral BID Jesusita Slipper, DO     • aspirin  81 mg Oral Daily Jesusita Slipper, DO     • folic acid-pyridoxine-cyanocobalamin  1 tablet Oral Daily [facility], DO     • furosemide  40 mg Oral Daily [facility], DO     • metoprolol  5 mg Intravenous Q6H PRN Emma Quach PA-C     • metoprolol succinate  200 mg Oral Daily [facility], DO     • ondansetron  4 mg Intravenous Q6H PRN [facility], DO     • pantoprazole  40 mg Oral Early Morning [facility], DO     • pravastatin  80 mg Oral Daily With [address], DO     • QUEtiapine  50 mg Oral HS [facility], DO     • sertraline  100 mg Oral Daily [address] [facility], DO          Today, Patient Was Seen By: [address] [facility]    **Please Note: This note may have been constructed using a voice recognition system  **

## 2022-10-16 NOTE — PROGRESS NOTES
Cardiology Progress Note - Bharati Aragon 80 y o  male MRN: [id number]    Unit/Bed#: ICU 05-01 Encounter: [id number]          Subjective:   Patient seen and examined  No complaints this AM  Volume status is improving, I/O 460/1370 with net negative -Ul  Connie Boyer Course:   Bharati Aragon is a 80y o  year old male with a history of Atrial Fibrillation, CVA (2015), CAD with MI and PCI of LAD with documented ICM and EF of 40% (2015 in [facility] TTE), HTN, HLD, Loop Recorder explantation (2018) who presented to [facility] for evaluation of fatigue, generalized weakness, SOB/NIELSEN which had been progressively worsening over the past few weeks PTA  Echo during this admission was notable for significantly reduced EF of 10-15% raising concern for tachy-mediated cardiomyopathy given presence of AF RVR on admission  Patient states that he has not seen a Cardiologists in a number of years  Vitals: Blood pressure 119/75, pulse (!) 120, temperature (!) 96 6 °F (35 9 °C), temperature source Tympanic, resp  rate (!) 33, height 6' (1 829 m), weight 114 kg (252 lb 5 1 oz), SpO2 97 %  , Body mass index is 34 22 kg/m² ,   Orthostatic Blood Pressures    Flowsheet Row Most Recent Value   Blood Pressure 119/75 filed at 10/16/2022 0800   Patient Position - Orthostatic VS Sitting filed at 10/16/2022 0800            Intake/Output Summary (Last 24 hours) at 10/16/2022 0826  Last data filed at 10/15/2022 2136  Gross per 24 hour   Intake 280 ml   Output 1370 ml   Net -1090 ml       Review of System:  Review of system was conducted and was negative except for as stated in the subjective course      Physical Exam:    GEN: Bharati Aragon appears well, alert and oriented x 3, pleasant and cooperative   HEENT:  Normocephalic, atraumatic, anicteric, moist mucous membranes  NECK: No JVD  HEART: irreg rhythm, irreg rate, normal S1 and S2, no murmurs, clicks, gallops or rubs   LUNGS: crackles in bilateral LLFs (improved), respiration nonlabored on NC  ABDOMEN:  Normoactive bowel sounds, soft, no tenderness, no distention  EXTREMITIES: peripheral pulses palpable; 1+ edema  NEURO: no gross focal findings; cranial nerves grossly intact   SKIN:  Dry, intact, warm to touch      Current Facility-Administered Medications:   •  acetaminophen (TYLENOL) tablet 650 mg, 650 mg, Oral, Q4H PRN, [facility], DO  •  apixaban Loanne Stack) tablet 2 5 mg, 2 5 mg, Oral, BID, Leeann Greek, DO, 2 5 mg at 10/15/22 1708  •  aspirin (ECOTRIN LOW STRENGTH) EC tablet 81 mg, 81 mg, Oral, Daily, Leeann Greek, DO, 81 mg at 19/51/87 9019  •  folic acid-pyridoxine-cyanocobalamin 2 5-25-2 mg per tablet 1 tablet, 1 tablet, Oral, Daily, [facility], , 1 tablet at 10/15/22 5436  •  furosemide (LASIX) tablet 40 mg, 40 mg, Oral, Daily, [facility],   •  metoprolol (LOPRESSOR) injection 5 mg, 5 mg, Intravenous, Q6H PRN, Lisa Joyce PA-C, 5 mg at 10/15/22 2329  •  metoprolol succinate (TOPROL-XL) 24 hr tablet 200 mg, 200 mg, Oral, Daily, [facility],   •  [facility]ansetron [facility]) injection 4 mg, 4 mg, Intravenous, Q6H PRN, [facility],   •  pantoprazole (PROTONIX) EC tablet 40 mg, 40 mg, Oral, Early Morning, [facility], DO, 40 mg at 10/16/22 8893  •  potassium chloride (K-DUR,KLOR-CON) CR tablet 40 mEq, 40 mEq, Oral, Once, [facility],   •  pravastatin (PRAVACHOL) tablet 80 mg, 80 mg, Oral, Daily With Dinner, [facility], DO, 80 mg at 10/15/22 1708  •  QUEtiapine (SEROquel) tablet 50 mg, 50 mg, Oral, HS, [facility], DO, 50 mg at 10/15/22 2136  •  sertraline (ZOLOFT) tablet 100 mg, 100 mg, Oral, Daily, Jim Prater Witt, , 100 mg at 10/15/22 1874    Labs & Results:  Results from last 7 days   Lab Units 10/13/22  1644 10/13/22  1416 10/13/22  1133   HS TNI 0HR ng/L  --   --  24   HS TNI 2HR ng/L  --  27  --    HS TNI 4HR ng/L 24  --   --          Results from last 7 days   Lab Units 10/16/22  0500 10/15/22  0604 10/14/22  0514   POTASSIUM mmol/L 3 5 3 4* 4 0   CO2 mmol/L 37* 33* 26   CHLORIDE mmol/L 104 103 105   BUN mg/dL 39* 41* 33*   CREATININE mg/dL 1 83* 1 88* 1 60*     Results from last 7 days   Lab Units 10/16/22  0500 10/15/22  0604 10/14/22  0514   HEMOGLOBIN g/dL 14 5 14 8 15 1   HEMATOCRIT % 47 2 47 4 48 2   PLATELETS Thousands/uL 214 219 243           Telemetry:   Personally reviewed by Nima Luna:   Atrial Fibrillation with rates in 110s to 120s    Short burst of NSVT, 8 beats        VTE Prophylaxis: Heparin        Assessment:  Principal Problem:    Atrial fibrillation with RVR ([facility])  Active Problems:    Acute systolic congestive heart failure (HCC)    CAD (coronary artery disease), native coronary artery    History of CVA (cerebrovascular accident)    Chronic respiratory failure with hypoxia ([facility] )    DANIEL (acute kidney injury) ([facility] )        1  Acute Biventricular HFrEF (10-15%) Exacerbation (unclear chronicity), NYHA Class III, ACC/AHA Stage C  --> Etiology: Presumably tachy-mediated in setting of underlying atrial fibrillation vs  Component of progressive CAD  2  Atrial Fibrillation with RVR  --> [id number]: 7 (Age, CHF, HTN, CVA, MI)  2  History of CAD with MI s/p PCI of LAD  3  CVA s/p ILR (2015) which was subsequently explanted in 2018 due to end of battery life  --> Presumably 2/2 AF  4  Hypertension  5  Acute Kidney Injury on CKD2 presumably 2/2 Cardiorenal Syndrome    Plan:  - c/w Eliquis 2 5mg PO BID  - c/w Aspirin 81mg pO Daily  - Short burst of NSVT noted (see above), c/w Metoprolol Succ  100mg PO Daily  - Start Digoxin Load: 250mcg IV x4, 125mcg PO Daily thereafter   --> Patient should get a Digoxin Level on Thursday next week (Please advise patient to not take medication that day until after labs are drawn to avoid false elevation)  - c/w Pravachol 80mg PO QHS  - Given severely reduced EF, would avoid Cardizem due to risk of further cardiac decompensation  Currently off ggt    --> If requiring additional Afib control, would opt for Amiodarone instread  - Replete electrolytes PRN with Mg/Phos/K goals of 2/3/4 respectively  - GDMT as tolerated once renal function improves (Entresto)  - Daily Weights and I/O's Q6H  - c/w Lasix 40mg PO Daily  - Outpatient follow up  Office will contact patient with date and time  Thank you for involving us in the care of your patient  Massimo Aiken MD  Cardiology      [facility]/ [facility]/Care Everywhere records reviewed:     ** Please Note: Fluency DirectDictation voice to text software may have been used in the creation of this document   **

## 2022-10-16 NOTE — CASE MANAGEMENT
Case Management Discharge Planning Note    Patient name Baylee Mcintosh  Location ICU 05/ICU 05- MRN [id number]  : 1935 Date 10/16/2022       Current Admission Date: 10/13/2022  Current Admission Diagnosis:Atrial fibrillation with RVR [facility])   Patient Active Problem List    Diagnosis Date Noted   • Bacteremia 10/16/2022   • DANIEL (acute kidney injury) ([facility] ) 10/14/2022   • Atrial fibrillation with RVR ([facility] )    • Acute systolic congestive heart failure ([facility] ) 10/13/2022   • CAD (coronary artery disease), native coronary artery 10/13/2022   • History of CVA (cerebrovascular accident) 10/13/2022   • Benign essential HTN 10/13/2022   • Hallucinations 10/13/2022   • Chronic respiratory failure with hypoxia ([facility] ) 10/13/2022      LOS (days): 3  Geometric Mean LOS (GMLOS) (days): 3 40  Days to GMLOS:0 3     OBJECTIVE:  Risk of Unplanned Readmission Score: 13 89         Current admission status: Inpatient   Preferred Pharmacy:   [address] [place] [address] [address]   [facility] [address]  Phone: [phone] Fax: [phone]    Primary Care Provider: Juanito Casper DO    Primary Insurance: MEDICARE  Secondary Insurance: [facility]    DISCHARGE DETAILS:    Discharge planning discussed with[de-identified] son was called at 13:01 pm and 13:08 and 14:26pm  Freedom of Choice: Yes  Comments - Freedom of Choice: recommendation is for hhc-   son gave permission to send a blanket referral for hhc   he reqiuested that the walker and commode be sent ot [address] [facility] contacted family/caregiver?: Yes             Contacts  Patient Contacts: Ana Starks  Relationship to Patient[de-identified] Family (son)  Contact Method: Phone  Phone Number: [phone]  Reason/Outcome: Discharge [address]         Is the patient interested in Leviarronu Tang at discharge?: Yes  Via Myles Haney requested[de-identified] Occupational Therapy, Physical Therapy, Nursing  Home Health Agency Name[de-identified] [facility]  [address] [facility] Provider[de-identified] PCP  Home Health Services Needed[de-identified] Evaluate Functional Status and Safety, Strengthening/Theraputic Exercises to Improve Function, Heart Failure Management, Other (comment) (review medications, cardiac assessment)  Homebound Criteria Met[de-identified] Uses an Assist Device (i e  cane, walker, etc), Requires the Assistance of Another Person for Safe Ambulation or to Leave the Home  Supporting Clincal Findings[de-identified] Limited Endurance    DME Referral Provided  Referral made for DME?: Yes  DME referral completed for the following items[de-identified] Bedside Commode, Walker (order sent through parach[employer])  DME Supplier Name[de-identified] [address]    Other Referral/Resources/Interventions Provided:  Interventions: HHC, DME, Prescription Price Check  Referral Comments: family requested [address]   referral sent throught [employer] for a walker & commode, price check for eliquis is needed, son was called at 14:27pm to let him know [facility] [employer] have accepted he was given their ratings   son requested raz    Would you like to participate in our [address] service program?  : No - Declined    Treatment Team Recommendation: Home with [address] [facility] (home with support from his son & raz & outpt follow up-son)                                   IMM Given (Date):: 10/16/22 (cm called ths son at 13:01pm  #[phone] IMM was rviewed over the phone- he stated he understood, son stated he can receive the copy tomorrow and it does not need to be mailed)  IMM Given to[de-identified] Family  Family notified[de-identified] son was called

## 2022-10-16 NOTE — PHYSICAL THERAPY NOTE
Physical Therapy Cancellation Note       10/16/22 0849   PT Last Visit   PT Visit Date 10/16/22   Note Type   Note type Cancelled Session   Cancel Reasons Medical status   Additional Comments pt with tachycardia at rest, noted to have resting HR of 133 bpm on tele  PT will hold at present time & continue to monitor  PT will perform evaluation when pt is medically appropriate  PT order received  Chart review performed  At this time, PT evaluation cancelled d/t medical status  PT will follow and evaluate as appropriate      Alec Saldana

## 2022-10-16 NOTE — ASSESSMENT & PLAN NOTE
· BClx (10/13): 1/2 sets positive for coagulase negative staph  · Likely a contaminant; no abx therapy is indicated at this time

## 2022-10-16 NOTE — ASSESSMENT & PLAN NOTE
· History of PAF with RANJITH and CVN in 2015  · Atrial fibrillation with HR better controlled  · [id number]: 6-7   · Continue Toprol 200mg daily  · If further rate control is needed, Amiodarone recommended given reduced EF  · Continue Eliquis to 2 5mg BID given renal impairment  · Cardiology following

## 2022-10-16 NOTE — PLAN OF CARE
Problem: PHYSICAL THERAPY ADULT  Goal: Performs mobility at highest level of function for planned discharge setting  See evaluation for individualized goals  Description: Treatment/Interventions: Functional transfer training, LE strengthening/ROM, Elevations, Therapeutic exercise, Patient/family training, Gait training, Spoke to nursing, Equipment eval/education, Spoke to MD  Equipment Recommended: Saritha Arredondo, Other (Comment) [facility])       See flowsheet documentation for full assessment, interventions and recommendations  Note: Prognosis: Good  Problem List: Impaired balance, Decreased mobility, Impaired judgement, Decreased strength, Impaired vision, Impaired hearing  Assessment: Pt is 80 y o  male seen for moderate-complexity PT evaluation on 10/16/2022 s/p admit to [facility] on 10/13/2022 w/ Atrial fibrillation with RVR ([facility] )  PT was consulted to assess pt's functional mobility and d/c needs  Order placed for PT eval and tx, w/ up as tolerated order  PTA, pt lives c son in 1 [place] c 1 [place]EE[place], amb c cane at baseline, (I) ADL & mobility performance, support from son who lives local c med management  At time of eval, pt performs transfers from multiple surfaces at SUP level, amb x household distances c SUP, improved gt sequencing and safety using RW  Upon evaluation, pt presenting with impaired functional mobility d/t decreased strength, impaired balance, decreased mobility, impaired judgement, impaired vision and impaired hearing  Pertinent PMHx and current co-morbidities affecting pt's physical performance at time of assessment include: CHF, DANIEL, CAD, h/o CVA, chronic respiratory failure c hypoxia, AFib  Personal factors affecting pt at time of eval include: increased age  The following objective measures performed on IE also reveal limitations: AM-PAC 6-Clicks: 72/83   Pt's clinical presentation is currently evolving seen in pt's presentation of ongoing medical assessment and improving medical status since admission & HR improvement following administration of medications  Overall, pt's rehab potential and prognosis to return to PLOF is good as impacted by objective findings, warranting pt to receive further skilled PT interventions to address identified impairments, activity limitation(s), and participation restriction(s)  Goal for patient is to return home  Pt to benefit from continued PT tx to address deficits as defined above and maximize level of functional independent mobility and consistency in order for pt to prevent future falls and ensure safety in home environment  From PT/mobility standpoint, recommendation at time of d/c would be home with home health rehabilitation pending progress in order to facilitate return to PLOF  Barriers to Discharge: None (pt has good support from son in community)     PT Discharge Recommendation: Home with home health rehabilitation    See flowsheet documentation for full assessment

## 2022-10-16 NOTE — PROGRESS NOTES
Progress Note - Nephrology   Leslye Goodell 80 y o  male MRN: [id number]  Unit/Bed#: ICU 05-01 Encounter: [id number]    A/P:  1  Acute kidney injury on top kidney disease              Creatinine is stable at this time at 1 83 mg/dL  Continue current care including current doses of diuretics or as our Cardiology colleagues would like them to be dosed  Continue avoid potential nephrotoxins  2  Chronic kidney disease stage 2 with baseline creatinine between 0 9-1 1 mg/dL  3  Hypokalemia               potassium levels appropriate, continue with supplementation to maintain levels given diuretic use  4  Hypomagnesemia               continue to check magnesium levels, at supplementation as indicated  5  Borderline hypernatremia/dehydration in the setting of hypervolemia              Patient's fluid restriction was discontinued, he should drink to thirst to allow for correction of the patient's water deficiency  6  Acute on chronic systolic congestive heart failure with ejection fraction of 10-15%               patient continues to improve, continue with diuretic use according to Cardiology/hospitalist at this time  7  Hypertension setting of chronic kidney disease   Blood pressures are appropriate, continue monitor    Follow up reason for today's visit:  Acute kidney injury/chronic kidney disease/multiple electrolyte abnormalities    Atrial fibrillation with RVR [facility])    Patient Active Problem List   Diagnosis   • Atrial fibrillation with RVR (HCC)   • Acute systolic congestive heart failure (HCC)   • CAD (coronary artery disease), native coronary artery   • History of CVA (cerebrovascular accident)   • Benign essential HTN   • Hallucinations   • Chronic respiratory failure with hypoxia ([facility] [address] )   • DANIEL (acute kidney injury) ([facility] [address] )   • Bacteremia         Subjective:   Patient feels significantly improved, has no acute complaints at this time including no nausea vomiting or diarrhea      Objective:     Vitals: Blood pressure 119/75, pulse (!) 124, temperature (!) 96 6 °F (35 9 °C), temperature source Tympanic, resp  rate (!) 33, height 6' (1 829 m), weight 114 kg (252 lb 5 1 oz), SpO2 97 %  ,Body mass index is 34 22 kg/m²  Weight (last 2 days)     Date/Time Weight    10/16/22 0600 114 (252 32)    10/15/22 0600 112 (247 8)     Weight: everything off bed at 10/15/22 0600    10/14/22 0736 113 (249)    10/14/22 0515 113 (249 12)            Intake/Output Summary (Last 24 hours) at 10/16/2022 1220  Last data filed at 10/15/2022 2136  Gross per 24 hour   Intake --   Output 875 ml   Net -875 ml     I/O last 3 completed shifts: In: 570 6 [P O :560; I V :10 6]  Out: 2645 [Urine:2645]         Physical Exam: /75   Pulse (!) 124   Temp (!) 96 6 °F (35 9 °C) (Tympanic)   Resp (!) 33   Ht 6' (1 829 m)   Wt 114 kg (252 lb 5 1 oz)   SpO2 97%   BMI 34 22 kg/m²     General Appearance:    Alert, cooperative, no distress, appears stated age   Head:    Normocephalic, without obvious abnormality, atraumatic   Eyes:    Conjunctiva/corneas clear   Ears:    Normal external ears   Nose:   Nares normal, septum midline, mucosa normal, no drainage    or sinus tenderness   Throat:   Lips, mucosa, and tongue normal; teeth and gums normal   Neck:   Supple   Back:     Symmetric, no curvature, ROM normal, no CVA tenderness   Lungs:     Clear to auscultation bilaterally, respirations unlabored   Chest wall:    No tenderness or deformity   Heart:    Regular rate and rhythm, S1 and S2 normal, no murmur, rub   or gallop   Abdomen:     Soft, non-tender, bowel sounds active   Extremities:   Extremities normal, atraumatic, no cyanosis, trace bilateral lower extremity edema   Skin:   Skin color, texture, turgor normal, no rashes or lesions   Lymph nodes:   Cervical normal   Neurologic:   CNII-XII intact            Lab, Imaging and other studies: I have personally reviewed pertinent labs    CBC:   Lab Results   Component Value Date    WBC 7 06 10/16/2022    HGB 14 5 10/16/2022    HCT 47 2 10/16/2022     (H) 10/16/2022     10/16/2022    MCH 31 6 10/16/2022    MCHC 30 7 (L) 10/16/2022    RDW 15 1 10/16/2022    MPV 10 1 10/16/2022     CMP:   Lab Results   Component Value Date    K 3 5 10/16/2022     10/16/2022    CO2 37 (H) 10/16/2022    BUN 39 (H) 10/16/2022    CREATININE 1 83 (H) 10/16/2022    CALCIUM 9 3 10/16/2022    EGFR 32 10/16/2022         Results from last 7 days   Lab Units 10/16/22  0500 10/15/22  0604 10/14/22  0514 10/13/22  1133   POTASSIUM mmol/L 3 5 3 4* 4 0 3 9   CHLORIDE mmol/L 104 103 105 105   CO2 mmol/L 37* 33* 26 28   BUN mg/dL 39* 41* 33* 28*   CREATININE mg/dL 1 83* 1 88* 1 60* 1 29   CALCIUM mg/dL 9 3 9 2 9 1 9 3   ALK PHOS U/L  --   --   --  67   ALT U/L  --   --   --  136*   AST U/L  --   --   --  97*         Phosphorus: No results found for: PHOS  Magnesium: No results found for: MG  Urinalysis: No results found for: COLORU, CLARITYU, SPECGRAV, PHUR, LEUKOCYTESUR, NITRITE, PROTEINUA, GLUCOSEU, KETONESU, BILIRUBINUR, BLOODU  Ionized Calcium: No results found for: CAION  Coagulation: No results found for: PT, INR, APTT  Troponin: No results found for: TROPONINI  ABG: No results found for: PHART, VKT9QVA, PO2ART, EGW2URJ, A8BNQUKE, BEART, SOURCE  Radiology review:     IMAGING  Procedure: Echo complete    Result Date: 10/14/2022  Narrative: •  Left Ventricle: Left ventricular cavity size is dilated  Wall thickness is normal  The left ventricular ejection fraction is 10-15%  Systolic function is severely reduced  There is severe global hypokinesis  Unable to assess diastolic function due to atrial fibrillation  •  Right Ventricle: Right ventricular cavity size is mildly dilated  Systolic function is reduced  Abnormal tricuspid annular plane systolic excursion (TAPSE) < 1 7 cm  •  Left Atrium: The atrium is moderately dilated  •  Right Atrium: The atrium is moderately dilated  •  Aortic Valve: The leaflets are thickened   The leaflets are moderately calcified  There is moderately reduced mobility  There is mild stenosis however may be underestimated in the setting of significantly reduced left ventricular function  •  Mitral Valve: There is annular calcification  There is mild regurgitation  •  Tricuspid Valve: There is mild regurgitation  •  Pulmonic Valve: There is mild regurgitation  Current Facility-Administered Medications   Medication Dose Route Frequency   • acetaminophen (TYLENOL) tablet 650 mg  650 mg Oral Q4H PRN   • apixaban (ELIQUIS) tablet 2 5 mg  2 5 mg Oral BID   • aspirin (ECOTRIN LOW STRENGTH) EC tablet 81 mg  81 mg Oral Daily   • folic acid-pyridoxine-cyanocobalamin 2 5-25-2 mg per tablet 1 tablet  1 tablet Oral Daily   • furosemide (LASIX) tablet 40 mg  40 mg Oral Daily   • metoprolol (LOPRESSOR) injection 5 mg  5 mg Intravenous Q6H PRN   • metoprolol succinate (TOPROL-XL) 24 hr tablet 200 mg  200 mg Oral Daily   • ondansetron (ZOFRAN) injection 4 mg  4 mg Intravenous Q6H PRN   • pantoprazole (PROTONIX) EC tablet 40 mg  40 mg Oral Early Morning   • pravastatin (PRAVACHOL) tablet 80 mg  80 mg Oral Daily With Dinner   • QUEtiapine (SEROquel) tablet 50 mg  50 mg Oral HS   • sertraline (ZOLOFT) tablet 100 mg  100 mg Oral Daily     Medications Discontinued During This Encounter   Medication Reason   • metoprolol (LOPRESSOR) injection 5 mg    • metoprolol (LOPRESSOR) injection 5 mg    • lisinopril (ZESTRIL) tablet 40 mg    • apixaban (ELIQUIS) tablet 5 mg    • aspirin-dipyridamole (AGGRENOX)  mg per 12 hr capsule 1 capsule    • furosemide (LASIX) injection 40 mg    • diltiazem (CARDIZEM) 125 mg in sodium chloride 0 9 % 125 mL infusion    • furosemide (LASIX) injection 40 mg    • metoprolol succinate (TOPROL-XL) 24 hr tablet 100 mg        Mansi Espinoza      This progress note was produced in part using a dictation device which may document imprecise wording from author's original intent

## 2022-10-17 ENCOUNTER — TELEPHONE (OUTPATIENT)
Dept: OTHER | Facility: HOSPITAL | Age: 87
End: 2022-10-17

## 2022-10-17 VITALS
WEIGHT: 252.32 LBS | BODY MASS INDEX: 34.18 KG/M2 | RESPIRATION RATE: 20 BRPM | HEART RATE: 108 BPM | DIASTOLIC BLOOD PRESSURE: 82 MMHG | TEMPERATURE: 97.8 F | OXYGEN SATURATION: 96 % | HEIGHT: 72 IN | SYSTOLIC BLOOD PRESSURE: 149 MMHG

## 2022-10-17 DIAGNOSIS — I48.91 ATRIAL FIBRILLATION WITH RVR (HCC): Primary | ICD-10-CM

## 2022-10-17 PROBLEM — Z71.89 GOALS OF CARE, COUNSELING/DISCUSSION: Status: ACTIVE | Noted: 2022-10-17

## 2022-10-17 LAB
ANION GAP SERPL CALCULATED.3IONS-SCNC: 8 MMOL/L (ref 4–13)
BACTERIA BLD CULT: ABNORMAL
BUN SERPL-MCNC: 35 MG/DL (ref 5–25)
CALCIUM SERPL-MCNC: 8.9 MG/DL (ref 8.4–10.2)
CHLORIDE SERPL-SCNC: 103 MMOL/L (ref 96–108)
CO2 SERPL-SCNC: 34 MMOL/L (ref 21–32)
CREAT SERPL-MCNC: 1.33 MG/DL (ref 0.6–1.3)
DME PARACHUTE DELIVERY DATE ACTUAL: NORMAL
DME PARACHUTE DELIVERY DATE REQUESTED: NORMAL
DME PARACHUTE ITEM DESCRIPTION: NORMAL
DME PARACHUTE ORDER STATUS: NORMAL
DME PARACHUTE SUPPLIER NAME: NORMAL
DME PARACHUTE SUPPLIER PHONE: NORMAL
ERYTHROCYTE [DISTWIDTH] IN BLOOD BY AUTOMATED COUNT: 14.9 % (ref 11.6–15.1)
GFR SERPL CREATININE-BSD FRML MDRD: 47 ML/MIN/1.73SQ M
GLUCOSE SERPL-MCNC: 98 MG/DL (ref 65–140)
GRAM STN SPEC: ABNORMAL
HCT VFR BLD AUTO: 48.9 % (ref 36.5–49.3)
HGB BLD-MCNC: 15.2 G/DL (ref 12–17)
MCH RBC QN AUTO: 31.4 PG (ref 26.8–34.3)
MCHC RBC AUTO-ENTMCNC: 31.1 G/DL (ref 31.4–37.4)
MCV RBC AUTO: 101 FL (ref 82–98)
PLATELET # BLD AUTO: 210 THOUSANDS/UL (ref 149–390)
PMV BLD AUTO: 10 FL (ref 8.9–12.7)
POTASSIUM SERPL-SCNC: 3.6 MMOL/L (ref 3.5–5.3)
RBC # BLD AUTO: 4.84 MILLION/UL (ref 3.88–5.62)
S EPIDERMIDIS DNA BLD POS QL NAA+NON-PRB: DETECTED
SODIUM SERPL-SCNC: 145 MMOL/L (ref 135–147)
WBC # BLD AUTO: 7.3 THOUSAND/UL (ref 4.31–10.16)

## 2022-10-17 PROCEDURE — 80048 BASIC METABOLIC PNL TOTAL CA: CPT | Performed by: INTERNAL MEDICINE

## 2022-10-17 PROCEDURE — 85027 COMPLETE CBC AUTOMATED: CPT | Performed by: INTERNAL MEDICINE

## 2022-10-17 PROCEDURE — 99239 HOSP IP/OBS DSCHRG MGMT >30: CPT | Performed by: NURSE PRACTITIONER

## 2022-10-17 PROCEDURE — 99232 SBSQ HOSP IP/OBS MODERATE 35: CPT | Performed by: INTERNAL MEDICINE

## 2022-10-17 PROCEDURE — 90662 IIV NO PRSV INCREASED AG IM: CPT | Performed by: INTERNAL MEDICINE

## 2022-10-17 PROCEDURE — G0008 ADMIN INFLUENZA VIRUS VAC: HCPCS | Performed by: INTERNAL MEDICINE

## 2022-10-17 RX ORDER — DIGOXIN 125 MCG
125 TABLET ORAL DAILY
Qty: 30 TABLET | Refills: 0 | Status: SHIPPED | OUTPATIENT
Start: 2022-10-17 | End: 2022-11-16

## 2022-10-17 RX ORDER — METOPROLOL SUCCINATE 100 MG/1
100 TABLET, EXTENDED RELEASE ORAL DAILY
Qty: 30 TABLET | Refills: 0 | Status: SHIPPED | OUTPATIENT
Start: 2022-10-18 | End: 2022-11-17

## 2022-10-17 RX ORDER — DIGOXIN 125 MCG
125 TABLET ORAL DAILY
Status: DISCONTINUED | OUTPATIENT
Start: 2022-10-17 | End: 2022-10-17 | Stop reason: HOSPADM

## 2022-10-17 RX ADMIN — SERTRALINE HYDROCHLORIDE 100 MG: 50 TABLET ORAL at 08:18

## 2022-10-17 RX ADMIN — DIGOXIN 125 MCG: 125 TABLET ORAL at 11:56

## 2022-10-17 RX ADMIN — ASPIRIN 81 MG: 81 TABLET, COATED ORAL at 08:18

## 2022-10-17 RX ADMIN — FUROSEMIDE 40 MG: 40 TABLET ORAL at 08:18

## 2022-10-17 RX ADMIN — APIXABAN 2.5 MG: 5 TABLET, FILM COATED ORAL at 08:18

## 2022-10-17 RX ADMIN — DIGOXIN 250 MCG: 0.25 INJECTION INTRAMUSCULAR; INTRAVENOUS at 08:18

## 2022-10-17 RX ADMIN — DIGOXIN 250 MCG: 0.25 INJECTION INTRAMUSCULAR; INTRAVENOUS at 03:08

## 2022-10-17 RX ADMIN — INFLUENZA A VIRUS A/VICTORIA/2570/2019 IVR-215 (H1N1) ANTIGEN (FORMALDEHYDE INACTIVATED), INFLUENZA A VIRUS A/DARWIN/9/2021 SAN-010 (H3N2) ANTIGEN (FORMALDEHYDE INACTIVATED), INFLUENZA B VIRUS B/PHUKET/3073/2013 ANTIGEN (FORMALDEHYDE INACTIVATED), AND INFLUENZA B VIRUS B/MICHIGAN/01/2021 ANTIGEN (FORMALDEHYDE INACTIVATED) 0.7 ML: 60; 60; 60; 60 INJECTION, SUSPENSION INTRAMUSCULAR at 11:56

## 2022-10-17 RX ADMIN — METOPROLOL SUCCINATE 100 MG: 50 TABLET, EXTENDED RELEASE ORAL at 08:18

## 2022-10-17 RX ADMIN — PANTOPRAZOLE SODIUM 40 MG: 40 TABLET, DELAYED RELEASE ORAL at 05:37

## 2022-10-17 RX ADMIN — FOLIC ACID-PYRIDOXINE-CYANOCOBALAMIN TAB 2.5-25-2 MG 1 TABLET: 2.5-25-2 TAB at 08:19

## 2022-10-17 NOTE — NURSING NOTE
Patient given discharge instructions at length in presence of brother  Expressed understanding of same   provided patient with Eliquis coupon  Brother aware it will be available for  after 5 pm today at [facility]

## 2022-10-17 NOTE — DISCHARGE SUMMARY
[address]  Discharge- Gabriel Renée 1935, 80 y o  male MRN: [id number]  Unit/Bed#: ICU 05-01 Encounter: [id number]  Primary Care Provider: Laureano Bailey DO   Date and time admitted to hospital: 10/13/2022 10:51 AM    * Atrial fibrillation with RVR ([facility] [address] )  Assessment & Plan  · History of PAF with RANJITH and CVN in 2015  · Atrial fibrillation with HR better controlled  · [id number]: 6-7    · Continue Toprol 100mg daily and started on digoxin 125mcg daily; received loading of digoxin while in the hospital   · Continue Eliquis to 2 5mg BID given renal impairment; ASA discontinued   · Cardiology input appreciated; will need outpatient Cardiology follow-up     Bacteremia  Assessment & Plan  · BClx (10/13): 1/2 sets positive for coagulase negative staph   · Likely a contaminant; no abx therapy is indicated at this time    DANIEL (acute kidney injury) ([facility] )  Assessment & Plan  · With baseline CKD stage 2; creatinine between 0 9-1 1 mg/dL  · This is in the setting of rapid atrial fibrillation and acute systolic heart failure with severely reduced EF  · Continue to hold home Lisinopril  Will need to follow up with outpatient blood work prior to resume     · Continue diuretic therapy as above  · Nephrology input appreciated    Chronic respiratory failure with hypoxia ([facility] [address] )  Assessment & Plan  · Patient notes that he is on 3L NC QHS chronically  · Continue to monitor respiratory status/oxygen requirements while admitted  · Management for CHF as documented above      History of CVA (cerebrovascular accident)  Assessment & Plan  · 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  · Continue Eliquis, and statin as above     CAD (coronary artery disease), native coronary artery  Assessment & Plan  · Continue with medical management  · ASA discontinued as eliquis is started     Acute systolic congestive heart failure ([facility] [address] )  Assessment & Plan  Wt Readings from Last 3 Encounters:   10/16/22 114 kg (252 lb 5 1 oz)   07/06/18 122 kg (270 lb)     · History of anterior MI  · BNP: 1272  · CXR (10/13): Moderate pulmonary edema, moderate right and small left pleural effusions  · TTE (10/14): LVEF 10-15%  There is severe global hypokinesis  Unable to assess diastolic function due to atrial fibrillation  Right ventricular cavity size is mildly dilated  The left atrium is moderately dilated  The right atrium is moderately dilated  · Per cardiology discussion with the patient his family, the would not be interested in ICD type approach  · Continue Toprol as above; hold ACE-I as DANIEL (resume when appropriate)  · Continue Lasix to 40mg PO daily  · Continue with low salt diet, daily weight   · Cardiology outpatient follow-up             Discharging Physician / Practitioner: Dari Ambriz  PCP: Elvia Voss DO  Admission Date:   Admission Orders (From admission, onward)     Ordered        10/13/22 1244  INPATIENT ADMISSION  Once                      Discharge Date: 10/17/22    Medical Problems             Resolved Problems  Date Reviewed: 10/17/2022   None                 Consultations During Hospital Stay:  · IP CONSULT TO NUTRITION SERVICES  · IP CONSULT TO CARDIOLOGY  · IP CONSULT TO NEPHROLOGY  · IP CONSULT TO NUTRITION SERVICES      Procedures Performed:   XR chest 1 view portable    Result Date: 10/13/2022  CHEST INDICATION:   weakness  COMPARISON:  None EXAM PERFORMED/VIEWS:  XR CHEST PORTABLE FINDINGS: Heart at upper limit of normal in size  Moderate pulmonary edema with moderate right and small left effusion  No pneumothorax  Osseous structures appear within normal limits for patient age  Moderate pulmonary edema with moderate right and small left effusion  Workstation performed: [id number]     Echo complete    Result Date: 10/14/2022  •  Left Ventricle: Left ventricular cavity size is dilated  Wall thickness is normal  The left ventricular ejection fraction is 10-15%  Systolic function is severely reduced   There is severe global hypokinesis  Unable to assess diastolic function due to atrial fibrillation  •  Right Ventricle: Right ventricular cavity size is mildly dilated  Systolic function is reduced  Abnormal tricuspid annular plane systolic excursion (TAPSE) < 1 7 cm  •  Left Atrium: The atrium is moderately dilated  •  Right Atrium: The atrium is moderately dilated  •  Aortic Valve: The leaflets are thickened  The leaflets are moderately calcified  There is moderately reduced mobility  There is mild stenosis however may be underestimated in the setting of significantly reduced left ventricular function  •  Mitral Valve: There is annular calcification  There is mild regurgitation  •  Tricuspid Valve: There is mild regurgitation  •  Pulmonic Valve: There is mild regurgitation  •      Significant Findings / Test Results:   · Refer to above    Incidental Findings:   · None     Test Results Pending at Discharge (will require follow up): · Final blood cultures     Outpatient Tests Requested:  · Follow-up with PCP and Cardiology    Complications:  None    Reason for Admission: Fatigue, Weakness - Generalized, and Shortness of Breath        Hospital Course:     Ginger Quan is a 80 y o  male patient who originally presented to the hospital on 10/13/2022 due to fatigue  The patient presented with worsening fatigue and shortness of breath  In the ED, the patient found to be in rapid ventricular response with heart rate as high as 130s  He subsequently was admitted  The patient was started IV diltiazem and eventually transition to oral digoxin  Cardiology evaluation done  His medications were adjusted to help with better rate control  Upon discharge he will be on Toprol 100 mg daily, digoxin 125 mcg daily and Eliquis 2 5 mg daily  It was noted on his echocardiogram that his LVEF dropped to 10-15%  Cardiology suspect that this is likely related to the  uncontrolled rate    At this time recommend to continue with medical management and follow-up with cardiology as an outpatient  Please see above list of diagnoses and related plan for additional information  Condition at Discharge: good     Discharge Day Visit / Exam:     Subjective:  Feeling well  Denies any dyspnea or chest pain  Vitals: Blood Pressure: 149/82 (10/17/22 0715)  Pulse: (!) 108 (10/17/22 0715)  Temperature: 97 8 °F (36 6 °C) (10/17/22 0715)  Temp Source: Tympanic (10/17/22 0715)  Respirations: 20 (10/17/22 0715)  Height: 6' (182 9 cm) (10/14/22 0736)  Weight - Scale: 114 kg (252 lb 5 1 oz) (10/16/22 0600)  SpO2: 96 % (10/17/22 0715)  Exam:   Physical Exam  Vitals and nursing note reviewed  Constitutional:       General: He is not in acute distress  Appearance: Normal appearance  Comments: Frail and elderly    HENT:      Head: Normocephalic and atraumatic  Right Ear: External ear normal       Left Ear: External ear normal       Nose: Nose normal       Mouth/Throat:      Mouth: Mucous membranes are moist       Pharynx: Oropharynx is clear  Eyes:      General:         Right eye: No discharge  Left eye: No discharge  Extraocular Movements: Extraocular movements intact  Pupils: Pupils are equal, round, and reactive to light  Cardiovascular:      Rate and Rhythm: Normal rate  Rhythm irregular  Pulses: Normal pulses  Heart sounds: Normal heart sounds  No murmur heard  Pulmonary:      Effort: Pulmonary effort is normal  No respiratory distress  Breath sounds: Normal breath sounds  No wheezing or rales  Abdominal:      General: Bowel sounds are normal  There is no distension  Palpations: Abdomen is soft  There is no mass  Tenderness: There is no abdominal tenderness  Musculoskeletal:         General: No swelling, tenderness or deformity  Normal range of motion  Cervical back: Normal range of motion and neck supple  No rigidity  Skin:     General: Skin is warm and dry        Capillary Refill: Capillary refill takes less than 2 seconds  Coloration: Skin is not pale  Findings: No erythema  Neurological:      General: No focal deficit present  Mental Status: He is alert and oriented to person, place, and time  Mental status is at baseline  Psychiatric:         Mood and Affect: Mood normal          Behavior: Behavior normal          Thought Content: Thought content normal          Judgment: Judgment normal        Discussion with Family: son via phone     Discharge instructions/Information to patient and family:   See after visit summary for information provided to patient and family  Provisions for Follow-Up Care:  See after visit summary for information related to follow-up care and any pertinent home health orders  Disposition:     Home with [facility]NA Services (Reminder: Complete face to face encounter)    Planned Readmission: no      Discharge Statement:  I spent 38 minutes discharging the patient  This time was spent on the day of discharge  I had direct contact with the patient on the day of discharge  Greater than 50% of the total time was spent examining patient, answering all patient questions, arranging and discussing plan of care with patient as well as directly providing post-discharge instructions  Additional time then spent on discharge activities  Discharge Medications:  See after visit summary for reconciled discharge medications provided to patient and family        ** Please Note: This note has been constructed using a voice recognition system **

## 2022-10-17 NOTE — CASE MANAGEMENT
Case Management Discharge Planning Note    Patient name Chapis Fink  Location ICU 05/ICU 05- MRN [id number]  : 1935 Date 10/17/2022       Current Admission Date: 10/13/2022  Current Admission Diagnosis:Atrial fibrillation with RVR [facility])   Patient Active Problem List    Diagnosis Date Noted   • Goals of care, counseling/discussion 10/17/2022   • Bacteremia 10/16/2022   • DANIEL (acute kidney injury) ([facility] [address] ) 10/14/2022   • Atrial fibrillation with RVR ([facility] [address] )    • Acute systolic congestive heart failure ([facility] [address] ) 10/13/2022   • CAD (coronary artery disease), native coronary artery 10/13/2022   • History of CVA (cerebrovascular accident) 10/13/2022   • Benign essential HTN 10/13/2022   • Hallucinations 10/13/2022   • Chronic respiratory failure with hypoxia ([facility] [address] ) 10/13/2022      LOS (days): 4  Geometric Mean LOS (GMLOS) (days): 3 40  Days to GMLOS:-0 5     OBJECTIVE:  Risk of Unplanned Readmission Score: 14 84     Current admission status: Inpatient   Preferred Pharmacy:   [facility] [address] [place] [address] [address]  [address] [place] [id number]  Phone: [phone] Fax: [phone]    Primary Care Provider: Shakira Santos DO    Primary Insurance: MEDICARE  Secondary Insurance: [facility]    DISCHARGE DETAILS:  TC to [address], spoke to [address] who states they will be reaching out to the pt son Tariq Landon for delivery of the walker and BSC today  Son will transport home, pt will start services with [facility]

## 2022-10-17 NOTE — TELEPHONE ENCOUNTER
Please call for outpatient follow up in 2-3 week  Dc from [facility] today, followed for DANIEL on CKD

## 2022-10-17 NOTE — PROGRESS NOTES
Emile Camargo MD  [facility]  The fellow may have told you but patient should have a digoxin level on Thursday   Hospitalist likely will order this  Carol Ann File should see him in 10 days to 2 weeks for a visit and EKG

## 2022-10-17 NOTE — ASSESSMENT & PLAN NOTE
· History of PAF with RANJITH and CVN in 2015  · Atrial fibrillation with HR better controlled  · [id number]: 6-7    · Continue Toprol 100mg daily and started on digoxin 125mcg daily; received loading of digoxin while in the hospital   · Continue Eliquis to 2 5mg BID given renal impairment; ASA discontinued   · Cardiology input appreciated; will need outpatient Cardiology follow-up

## 2022-10-17 NOTE — ASSESSMENT & PLAN NOTE
May be in part tachycardia related EF reduction  In any event philosophically patient and family would not be interested in an ICD type approach

## 2022-10-17 NOTE — PLAN OF CARE
Problem: MOBILITY - ADULT  Goal: Maintain or return to baseline ADL function  Description: INTERVENTIONS:  -  Assess patient's ability to carry out ADLs; assess patient's baseline for ADL function and identify physical deficits which impact ability to perform ADLs (bathing, care of mouth/teeth, toileting, grooming, dressing, etc )  - Assess/evaluate cause of self-care deficits   - Assess range of motion  - Assess patient's mobility; develop plan if impaired  - Assess patient's need for assistive devices and provide as appropriate  - Encourage maximum independence but intervene and supervise when necessary  - Involve family in performance of ADLs  - Assess for home care needs following discharge   - Consider OT consult to assist with ADL evaluation and planning for discharge  - Provide patient education as appropriate  Outcome: Progressing  Goal: Maintains/Returns to pre admission functional level  Description: INTERVENTIONS:  - Perform BMAT or MOVE assessment daily    - Set and communicate daily mobility goal to care team and patient/family/caregiver  - Collaborate with rehabilitation services on mobility goals if consulted  - Perform Range of Motion 3 times a day  - Reposition patient every 2 hours    - Dangle patient 3 times a day  - Stand patient 3 times a day  - Ambulate patient 3 times a day  - Out of bed to chair 3 times a day   - Out of bed for meals 3 times a day  - Out of bed for toileting  - Record patient progress and toleration of activity level   Outcome: Progressing     Problem: PAIN - ADULT  Goal: Verbalizes/displays adequate comfort level or baseline comfort level  Description: Interventions:  - Encourage patient to monitor pain and request assistance  - Assess pain using appropriate pain scale  - Administer analgesics based on type and severity of pain and evaluate response  - Implement non-pharmacological measures as appropriate and evaluate response  - Consider cultural and social influences on pain and pain management  - Notify physician/advanced practitioner if interventions unsuccessful or patient reports new pain  Outcome: Progressing     Problem: INFECTION - ADULT  Goal: Absence or prevention of progression during hospitalization  Description: INTERVENTIONS:  - Assess and monitor for signs and symptoms of infection  - Monitor lab/diagnostic results  - Monitor all insertion sites, i e  indwelling lines, tubes, and drains  - Monitor endotracheal if appropriate and nasal secretions for changes in amount and color  - [place] appropriate cooling/warming therapies per order  - Administer medications as ordered  - Instruct and encourage patient and family to use good hand hygiene technique  - Identify and instruct in appropriate isolation precautions for identified infection/condition  Outcome: Progressing  Goal: Absence of fever/infection during neutropenic period  Description: INTERVENTIONS:  - Monitor WBC    Outcome: Progressing     Problem: SAFETY ADULT  Goal: Maintain or return to baseline ADL function  Description: INTERVENTIONS:  -  Assess patient's ability to carry out ADLs; assess patient's baseline for ADL function and identify physical deficits which impact ability to perform ADLs (bathing, care of mouth/teeth, toileting, grooming, dressing, etc )  - Assess/evaluate cause of self-care deficits   - Assess range of motion  - Assess patient's mobility; develop plan if impaired  - Assess patient's need for assistive devices and provide as appropriate  - Encourage maximum independence but intervene and supervise when necessary  - Involve family in performance of ADLs  - Assess for home care needs following discharge   - Consider OT consult to assist with ADL evaluation and planning for discharge  - Provide patient education as appropriate  Outcome: Progressing  Goal: Maintains/Returns to pre admission functional level  Description: INTERVENTIONS:  - Perform BMAT or MOVE assessment daily    - Set and communicate daily mobility goal to care team and patient/family/caregiver  - Collaborate with rehabilitation services on mobility goals if consulted  - Perform Range of Motion 4 times a day  - Reposition patient every 2 hours    - Dangle patient 3 times a day  - Stand patient 3 times a day  - Ambulate patient 3 times a day  - Out of bed to chair 3 times a day   - Out of bed for meals 3 times a day  - Out of bed for toileting  - Record patient progress and toleration of activity level   Outcome: Progressing  Goal: Patient will remain free of falls  Description: INTERVENTIONS:  - Educate patient/family on patient safety including physical limitations  - Instruct patient to call for assistance with activity   - Consult OT/PT to assist with strengthening/mobility   - Keep Call bell within reach  - Keep bed low and locked with side rails adjusted as appropriate  - Keep care items and personal belongings within reach  - Initiate and maintain comfort rounds  - Make Fall Risk Sign visible to staff  - Offer Toileting every 2 Hours, in advance of need  - Initiate/Maintain bed/chair alarm  - Obtain necessary fall risk management equipment: bed/chair  - Apply yellow socks and bracelet for high fall risk patients  - Consider moving patient to room near nurses station  Outcome: Progressing     Problem: DISCHARGE PLANNING  Goal: Discharge to home or other facility with appropriate resources  Description: INTERVENTIONS:  - Identify barriers to discharge w/patient and caregiver  - Arrange for needed discharge resources and transportation as appropriate  - Identify discharge learning needs (meds, wound care, etc )  - Arrange for interpretive services to assist at discharge as needed  - Refer to Case Management Department for coordinating discharge planning if the patient needs post-hospital services based on physician/advanced practitioner order or complex needs related to functional status, cognitive ability, or social support system  Outcome: Progressing     Problem: Knowledge Deficit  Goal: Patient/family/caregiver demonstrates understanding of disease process, treatment plan, medications, and discharge instructions  Description: Complete learning assessment and assess knowledge base    Interventions:  - Provide teaching at level of understanding  - Provide teaching via preferred learning methods  Outcome: Progressing     Problem: Prexisting or High Potential for Compromised Skin Integrity  Goal: Skin integrity is maintained or improved  Description: INTERVENTIONS:  - Identify patients at risk for skin breakdown  - Assess and monitor skin integrity  - Assess and monitor nutrition and hydration status  - Monitor labs   - Assess for incontinence   - Turn and reposition patient  - Assist with mobility/ambulation  - Relieve pressure over bony prominences  - Avoid friction and shearing  - Provide appropriate hygiene as needed including keeping skin clean and dry  - Evaluate need for skin moisturizer/barrier cream  - Collaborate with interdisciplinary team   - Patient/family teaching  - Consider wound care consult   Outcome: Progressing     Problem: Potential for Falls  Goal: Patient will remain free of falls  Description: INTERVENTIONS:  - Educate patient/family on patient safety including physical limitations  - Instruct patient to call for assistance with activity   - Consult OT/PT to assist with strengthening/mobility   - Keep Call bell within reach  - Keep bed low and locked with side rails adjusted as appropriate  - Keep care items and personal belongings within reach  - Initiate and maintain comfort rounds  - Make Fall Risk Sign visible to staff  - Offer Toileting every 2 Hours, in advance of need  - Initiate/Maintain bed/ chair alarm  - Obtain necessary fall risk management equipment: bed/chair alarm  - Apply yellow socks and bracelet for high fall risk patients  - Consider moving patient to room near nurses station  Outcome: Progressing     Problem: Nutrition/Hydration-ADULT  Goal: Nutrient/Hydration intake appropriate for improving, restoring or maintaining nutritional needs  Description: Monitor and assess patient's nutrition/hydration status for malnutrition  Collaborate with interdisciplinary team and initiate plan and interventions as ordered  Monitor patient's weight and dietary intake as ordered or per policy  Utilize nutrition screening tool and intervene as necessary  Determine patient's food preferences and provide high-protein, high-caloric foods as appropriate       INTERVENTIONS:  - Monitor oral intake, urinary output, labs, and treatment plans  - Assess nutrition and hydration status and recommend course of action  - Evaluate amount of meals eaten  - Assist patient with eating if necessary   - Allow adequate time for meals  - Recommend/ encourage appropriate diets, oral nutritional supplements, and vitamin/mineral supplements  - Order, calculate, and assess calorie counts as needed  - Recommend, monitor, and adjust tube feedings and TPN/PPN based on assessed needs  - Assess need for intravenous fluids  - Provide specific nutrition/hydration education as appropriate  - Include patient/family/caregiver in decisions related to nutrition  Outcome: Progressing

## 2022-10-17 NOTE — ASSESSMENT & PLAN NOTE
Wt Readings from Last 3 Encounters:   10/16/22 114 kg (252 lb 5 1 oz)   07/06/18 122 kg (270 lb)     · History of anterior MI  · BNP: 1272  · CXR (10/13): Moderate pulmonary edema, moderate right and small left pleural effusions  · TTE (10/14): LVEF 10-15%  There is severe global hypokinesis  Unable to assess diastolic function due to atrial fibrillation  Right ventricular cavity size is mildly dilated  The left atrium is moderately dilated  The right atrium is moderately dilated  · Per cardiology discussion with the patient his family, the would not be interested in ICD type approach    · Continue Toprol as above; hold ACE-I as DANIEL (resume when appropriate)  · Continue Lasix to 40mg PO daily  · Continue with low salt diet, daily weight   · Cardiology outpatient follow-up

## 2022-10-17 NOTE — PROGRESS NOTES
[facility] [address]  Progress Note Mya People 1935, 80 y o  male MRN: [id number]  Unit/Bed#: ICU 05-01 Encounter: [id number]  Primary Care Provider: Alicia Valerio DO   Date and time admitted to hospital: 10/13/2022 10:51 AM    Goals of care, counseling/discussion  Assessment & Plan  Discussed with patient and son  Emphasis on quality of life at this point  CAD (coronary artery disease), native coronary artery  Assessment & Plan  Fortunately no current symptoms  Acute systolic congestive heart failure [facility])  Assessment & Plan  May be in part tachycardia related EF reduction  In any event philosophically patient and family would not be interested in an ICD type approach  * Atrial fibrillation with RVR (HCC)  Assessment & Plan  Heart rate better  Will continue metoprolol and digoxin for rate control  Renal adjusted dose Eliquis will be continued  Aspirin will be discontinued  Outpatient Cardiologist:  Will be our [employer]  Subjective:   Patient seen and examined  No significant events overnight  Summary comments: In general patient seems to be doing better  Now on digoxin and metoprolol for heart rate control  Continue Lasix for now as well  Aspirin being discontinued  Patient has a long history of atrial fibrillation as well as prior stroke in 2015 and prior LAD stent  Telemetry/ECG/Cardiac testing:   Atrial fibrillation with moderate to rapid response  TTE 10/14/2022:  LVEF 10-15%  Diminished RV contractility as well  Vitals: Blood pressure 149/82, pulse (!) 108, temperature 97 8 °F (36 6 °C), temperature source Tympanic, resp   rate 20, height 6' (1 829 m), weight 114 kg (252 lb 5 1 oz), SpO2 96 % ,   Orthostatic Blood Pressures    Flowsheet Row Most Recent Value   Blood Pressure 149/82 filed at 10/17/2022 0715   Patient Position - Orthostatic VS Lying filed at 10/17/2022 0715      ,   Weight (last 2 days)     Date/Time Weight 10/16/22 0600 114 (252 32)    10/15/22 0600 112 (247 8)     Weight: everything off bed at 10/15/22 0600          Physical Exam:    General:  Normal appearance in no distress  Eyes:  Anicteric  Oral mucosa:  Moist   Neck:  No JV D  Carotid upstrokes are brisk without bruits  No masses  Chest:  Clear to auscultation and percussion  Cardiac:  Irregularly irregular  No palpable PMI  Normal S1 and S2  No murmur gallop or rub  Abdomen:  Soft and nontender  No palpable organomegaly or aortic enlargement  Extremities:  No peripheral edema  Neuro:  Grossly symmetric  Psych:  Alert and oriented x3        Medications:      Current Facility-Administered Medications:   •  acetaminophen (TYLENOL) tablet 650 mg, 650 mg, Oral, Q4H PRN, [facility],   •  apixaban Sacha Brazil) tablet 2 5 mg, 2 5 mg, Oral, BID, Reneadamaris Mcwilliamsing, DO, 2 5 mg at 83/03/67 3401  •  folic acid-pyridoxine-cyanocobalamin 2 5-25-2 mg per tablet 1 tablet, 1 tablet, Oral, Daily, [facility], DO, 1 tablet at 10/17/22 5961  •  furosemide (LASIX) tablet 40 mg, 40 mg, Oral, Daily, [facility], DO, 40 mg at 10/17/22 0818  •  influenza vaccine, high-dose (FLUZONE HIGH-DOSE) IM injection LAURENCE 0 7 mL, 0 7 mL, Intramuscular, Once, [facility] DO  •  metoprolol (LOPRESSOR) injection 5 mg, 5 mg, Intravenous, Q6H PRN, Darcie Vargas PA-C, 5 mg at 10/15/22 2329  •  metoprolol succinate (TOPROL-XL) 24 hr tablet 100 mg, 100 mg, Oral, Daily, [facility], DO, 100 mg at 10/17/22 0818  •  ondansetron (ZOFRAN) injection 4 mg, 4 mg, Intravenous, Q6H PRN, [facility],   •  pantoprazole (PROTONIX) EC tablet 40 mg, 40 mg, Oral, Early Morning, [facility], DO, 40 mg at 10/17/22 4804  •  pravastatin (PRAVACHOL) tablet 80 mg, 80 mg, Oral, Daily With Dinner, Paola Aceves DO, 80 mg at 10/16/22 1748  •  QUEtiapine (SEROquel) tablet 50 mg, 50 mg, Oral, , Lorena Witt DO, 50 mg at 10/16/22 2122  •  sertraline (ZOLOFT) tablet 100 mg, 100 mg, Oral, Daily, Tami Davidson Witt, DO, 100 mg at 10/17/22 0818     Labs & Results:    Troponins:    Results from last 7 days   Lab Units 10/13/22  1644 10/13/22  1416 10/13/22  1133   HS TNI 0HR ng/L  --   --  24   HS TNI 2HR ng/L  --  27  --    HSTNI D2 ng/L  --  3  --    HS TNI 4HR ng/L 24  --   --    HSTNI D4 ng/L 0  --   --         BNP:   Results from last 6 Months   Lab Units 10/13/22  1133   BNP pg/mL 1,272*     CBC with diff:   Results from last 7 days   Lab Units 10/17/22  0536 10/16/22  0500   WBC Thousand/uL 7 30 7 06   HEMOGLOBIN g/dL 15 2 14 5   HEMATOCRIT % 48 9 47 2   MCV fL 101* 103*   PLATELETS Thousands/uL 210 214     TSH:     CMP:   Results from last 7 days   Lab Units 10/17/22  0537 10/16/22  0500 10/14/22  0514 10/13/22  1133   POTASSIUM mmol/L 3 6 3 5   < > 3 9   CHLORIDE mmol/L 103 104   < > 105   CO2 mmol/L 34* 37*   < > 28   BUN mg/dL 35* 39*   < > 28*   CREATININE mg/dL 1 33* 1 83*   < > 1 29   AST U/L  --   --   --  97*   ALT U/L  --   --   --  136*   EGFR ml/min/1 73sq m 47 32   < > 49    < > = values in this interval not displayed       Lipid Profile:     Coags:   Results from last 7 days   Lab Units 10/13/22  1133   INR  1 32*

## 2022-10-17 NOTE — ASSESSMENT & PLAN NOTE
Heart rate better  Will continue metoprolol and digoxin for rate control  Renal adjusted dose Eliquis will be continued  Aspirin will be discontinued

## 2022-10-17 NOTE — DISCHARGE INSTR - AVS FIRST PAGE
Dear Darlene Mabry,     It was our pleasure to care for you here at [facility],  [address]  It is our hope that we were always able to exceed the expected standards for your care during your stay  You were hospitalized due to generalized weakness  You were cared for on the 2nd  floor by Alyson Hu with the [facility] who covers for your primary care physician (PCP), Flaca Loyola, DO, while you were hospitalized  If you have any questions or concerns related to this hospitalization, you may contact us at [phone]  For follow up as well as any medication refills, we recommend that you follow up with your primary care physician  A registered nurse will reach out to you by phone within a few days after your discharge to answer any additional questions that you may have after going home  However, at this time we provide for you here, the most important instructions / recommendations at discharge:     Notable Medication Adjustments -   Toprol 100 mg daily - heart medication   Digoxin 125 mcg daily- heart medication   Furosemide 40 mg daily- water pill   Eliquis 2 5 mg twice daily- blood thinner   Stop aspirin  Hold ramipril for now until seen by your PCP or cardiology   Testing Required after Discharge -   Follow up with BMP and digoxin level on 10/19  Important follow up information -   Follow-up with PCP and Cardiology  Other Instructions -   Please refer to discharge instruction   Please review this entire after visit summary as additional general instructions including medication list, appointments, activity, diet, any pertinent wound care, and other additional recommendations from your care team that may be provided for you        Sincerely,     Alyson Hu

## 2022-10-17 NOTE — ASSESSMENT & PLAN NOTE
· With baseline CKD stage 2; creatinine between 0 9-1 1 mg/dL  · This is in the setting of rapid atrial fibrillation and acute systolic heart failure with severely reduced EF  · Continue to hold home Lisinopril  Will need to follow up with outpatient blood work prior to resume     · Continue diuretic therapy as above  · Nephrology input appreciated

## 2022-10-18 ENCOUNTER — HOME CARE VISIT (OUTPATIENT)
Dept: HOME HEALTH SERVICES | Facility: HOME HEALTHCARE | Age: 87
End: 2022-10-18
Payer: MEDICARE

## 2022-10-18 VITALS
OXYGEN SATURATION: 97 % | DIASTOLIC BLOOD PRESSURE: 80 MMHG | RESPIRATION RATE: 18 BRPM | HEART RATE: 88 BPM | SYSTOLIC BLOOD PRESSURE: 140 MMHG | TEMPERATURE: 97.8 F

## 2022-10-18 LAB
BACTERIA BLD CULT: NORMAL
DME PARACHUTE DELIVERY DATE ACTUAL: NORMAL
DME PARACHUTE DELIVERY DATE REQUESTED: NORMAL
DME PARACHUTE ITEM DESCRIPTION: NORMAL
DME PARACHUTE ORDER STATUS: NORMAL
DME PARACHUTE SUPPLIER NAME: NORMAL
DME PARACHUTE SUPPLIER PHONE: NORMAL

## 2022-10-18 PROCEDURE — G0299 HHS/HOSPICE OF RN EA 15 MIN: HCPCS

## 2022-10-18 PROCEDURE — 400013 VN SOC

## 2022-10-19 ENCOUNTER — HOME CARE VISIT (OUTPATIENT)
Dept: HOME HEALTH SERVICES | Facility: HOME HEALTHCARE | Age: 87
End: 2022-10-19
Payer: MEDICARE

## 2022-10-19 VITALS — DIASTOLIC BLOOD PRESSURE: 84 MMHG | OXYGEN SATURATION: 93 % | SYSTOLIC BLOOD PRESSURE: 130 MMHG | HEART RATE: 66 BPM

## 2022-10-19 PROCEDURE — G0151 HHCP-SERV OF PT,EA 15 MIN: HCPCS

## 2022-10-20 ENCOUNTER — TELEPHONE (OUTPATIENT)
Dept: LAB | Facility: HOSPITAL | Age: 87
End: 2022-10-20

## 2022-10-20 ENCOUNTER — HOME CARE VISIT (OUTPATIENT)
Dept: HOME HEALTH SERVICES | Facility: HOME HEALTHCARE | Age: 87
End: 2022-10-20
Payer: MEDICARE

## 2022-10-20 VITALS
SYSTOLIC BLOOD PRESSURE: 118 MMHG | TEMPERATURE: 97.2 F | RESPIRATION RATE: 18 BRPM | HEART RATE: 62 BPM | OXYGEN SATURATION: 99 % | DIASTOLIC BLOOD PRESSURE: 62 MMHG

## 2022-10-20 PROCEDURE — G0299 HHS/HOSPICE OF RN EA 15 MIN: HCPCS

## 2022-10-20 NOTE — CASE COMMUNICATION
PT initial evaluation completed  Pt demonstrates ms weakness, balance deficits, dimnished endurance and difficulty with transfers/gait/stair negotiation  Plan to see 1xwk x 1wk and then 2xwk x 2wk, 1xwk x 1 wk for strengthening/endurance improvements, gait/transfer/stair and balance training

## 2022-10-21 ENCOUNTER — HOME CARE VISIT (OUTPATIENT)
Dept: HOME HEALTH SERVICES | Facility: HOME HEALTHCARE | Age: 87
End: 2022-10-21
Payer: MEDICARE

## 2022-10-21 PROCEDURE — G0152 HHCP-SERV OF OT,EA 15 MIN: HCPCS

## 2022-10-24 ENCOUNTER — HOME CARE VISIT (OUTPATIENT)
Dept: HOME HEALTH SERVICES | Facility: HOME HEALTHCARE | Age: 87
End: 2022-10-24
Payer: MEDICARE

## 2022-10-24 ENCOUNTER — APPOINTMENT (OUTPATIENT)
Dept: LAB | Facility: CLINIC | Age: 87
End: 2022-10-24
Payer: MEDICARE

## 2022-10-24 VITALS — SYSTOLIC BLOOD PRESSURE: 138 MMHG | HEART RATE: 82 BPM | OXYGEN SATURATION: 96 % | DIASTOLIC BLOOD PRESSURE: 80 MMHG

## 2022-10-24 VITALS — SYSTOLIC BLOOD PRESSURE: 114 MMHG | DIASTOLIC BLOOD PRESSURE: 68 MMHG | HEART RATE: 67 BPM | OXYGEN SATURATION: 95 %

## 2022-10-24 DIAGNOSIS — I48.91 ATRIAL FIBRILLATION WITH RAPID VENTRICULAR RESPONSE (HCC): ICD-10-CM

## 2022-10-24 LAB
ANION GAP SERPL CALCULATED.3IONS-SCNC: 6 MMOL/L (ref 4–13)
BUN SERPL-MCNC: 21 MG/DL (ref 5–25)
CALCIUM SERPL-MCNC: 9 MG/DL (ref 8.3–10.1)
CHLORIDE SERPL-SCNC: 101 MMOL/L (ref 96–108)
CO2 SERPL-SCNC: 36 MMOL/L (ref 21–32)
CREAT SERPL-MCNC: 1.19 MG/DL (ref 0.6–1.3)
GFR SERPL CREATININE-BSD FRML MDRD: 54 ML/MIN/1.73SQ M
GLUCOSE P FAST SERPL-MCNC: 104 MG/DL (ref 65–99)
POTASSIUM SERPL-SCNC: 3.5 MMOL/L (ref 3.5–5.3)
SODIUM SERPL-SCNC: 143 MMOL/L (ref 135–147)

## 2022-10-24 PROCEDURE — 36415 COLL VENOUS BLD VENIPUNCTURE: CPT

## 2022-10-24 PROCEDURE — 80048 BASIC METABOLIC PNL TOTAL CA: CPT

## 2022-10-24 PROCEDURE — 82397 CHEMILUMINESCENT ASSAY: CPT

## 2022-10-24 PROCEDURE — G0151 HHCP-SERV OF PT,EA 15 MIN: HCPCS

## 2022-10-24 PROCEDURE — G0180 MD CERTIFICATION HHA PATIENT: HCPCS | Performed by: INTERNAL MEDICINE

## 2022-10-24 NOTE — CASE COMMUNICATION
Patient demonstrates generalized weakness, difficulty with dressing, bathing, decreased safety with tub/ toilet transfers and increased risk for falls  OT to 2x continue  per week x 3 weeks for therapeutic exercise, ADL training, review of safety with transfers/mobility and recommendations for DME as appropriate

## 2022-10-25 ENCOUNTER — OFFICE VISIT (OUTPATIENT)
Dept: NEPHROLOGY | Facility: CLINIC | Age: 87
End: 2022-10-25
Payer: MEDICARE

## 2022-10-25 ENCOUNTER — HOME CARE VISIT (OUTPATIENT)
Dept: HOME HEALTH SERVICES | Facility: HOME HEALTHCARE | Age: 87
End: 2022-10-25

## 2022-10-25 ENCOUNTER — HOME CARE VISIT (OUTPATIENT)
Dept: HOME HEALTH SERVICES | Facility: HOME HEALTHCARE | Age: 87
End: 2022-10-25
Payer: MEDICARE

## 2022-10-25 VITALS
OXYGEN SATURATION: 98 % | BODY MASS INDEX: 32.4 KG/M2 | SYSTOLIC BLOOD PRESSURE: 150 MMHG | DIASTOLIC BLOOD PRESSURE: 82 MMHG | HEART RATE: 58 BPM | WEIGHT: 239.2 LBS | HEIGHT: 72 IN

## 2022-10-25 DIAGNOSIS — Z71.89 GOALS OF CARE, COUNSELING/DISCUSSION: ICD-10-CM

## 2022-10-25 DIAGNOSIS — I50.21 ACUTE SYSTOLIC CONGESTIVE HEART FAILURE (HCC): ICD-10-CM

## 2022-10-25 DIAGNOSIS — N17.9 AKI (ACUTE KIDNEY INJURY) (HCC): Primary | ICD-10-CM

## 2022-10-25 DIAGNOSIS — I10 BENIGN ESSENTIAL HTN: ICD-10-CM

## 2022-10-25 DIAGNOSIS — N18.31 CHRONIC KIDNEY DISEASE, STAGE 3A (HCC): ICD-10-CM

## 2022-10-25 PROCEDURE — G0152 HHCP-SERV OF OT,EA 15 MIN: HCPCS

## 2022-10-25 PROCEDURE — 99214 OFFICE O/P EST MOD 30 MIN: CPT | Performed by: PHYSICIAN ASSISTANT

## 2022-10-25 NOTE — ASSESSMENT & PLAN NOTE
Wt Readings from Last 3 Encounters:   10/25/22 109 kg (239 lb 3 2 oz)   10/16/22 114 kg (252 lb 5 1 oz)   07/06/18 122 kg (270 lb)   On beta-blocker and loop diuretic  Okay to restart ACE-inhibitor if needed per Cardiology standpoint  Recommend load dose and monitoring BMP within 1-2 weeks  Gave low-sodium diet literature  See goals of care, referred for ACP  This can be done by Cardiology or Nephrology, whichever patient chooses

## 2022-10-25 NOTE — ASSESSMENT & PLAN NOTE
Acute kidney injury is resolved  Okay re-initiation of lisinopril, if deemed necessary by Cardiology  Monitor BMP within 1-2 weeks and recommend starting low-dose  Avoid nephrotoxins, such as NSAIDs

## 2022-10-25 NOTE — ASSESSMENT & PLAN NOTE
Blood pressures are acceptable  Continue current antihypertensive regimen  See acute kidney injury for recommendations regarding lisinopril

## 2022-10-25 NOTE — ASSESSMENT & PLAN NOTE
EF 10-15%  Referred for advance care planning  This can be done by Cardiology if they offer these meetings, if patient and son prefer  Five wishes packet was given  Son is power of

## 2022-10-25 NOTE — PROGRESS NOTES
Assessment & Plan:    1  DANIEL (acute kidney injury) [facility])  Assessment & Plan:  Acute kidney injury is resolved  Okay re-initiation of lisinopril, if deemed necessary by Cardiology  Monitor BMP within 1-2 weeks and recommend starting low-dose  Avoid nephrotoxins, such as NSAIDs  2  Benign essential HTN  Assessment & Plan:  Blood pressures are acceptable  Continue current antihypertensive regimen  See acute kidney injury for recommendations regarding lisinopril  3  Goals of care, counseling/discussion  Assessment & Plan:  EF 10-15%  Referred for advance care planning  This can be done by Cardiology if they offer these meetings, if patient and son prefer  Five wishes packet was given  Son is power of   Orders:  -     Ambulatory referral to advanced care planning; Future    4  Chronic kidney disease, stage 3a (HCC)  -     CBC and differential; Future; Expected date: 10/25/2022  -     Comprehensive metabolic panel; Future  -     Protein / creatinine ratio, urine  -     Urinalysis with microscopic    5  Acute systolic congestive heart failure [facility])  Assessment & Plan:  Wt Readings from Last 3 Encounters:   10/25/22 109 kg (239 lb 3 2 oz)   10/16/22 114 kg (252 lb 5 1 oz)   07/06/18 122 kg (270 lb)   On beta-blocker and loop diuretic  Okay to restart ACE-inhibitor if needed per Cardiology standpoint  Recommend load dose and monitoring BMP within 1-2 weeks  Gave low-sodium diet literature  See goals of care, referred for ACP  This can be done by Cardiology or Nephrology, whichever patient chooses  The benefits, risks and alternatives to the treatment plan were discussed at this visit  Patient was advised of common adverse effects of any medical therapies prescribed  All questions were answered and discussed with the patient and any accompanying family members or caretakers  Subjective:      Patient ID: Pura Islas is a 80 y o  male seen in the [place] office       HPI Patient was hospitalized at [facility] from 10/13/2022 through 10/17/2022 , which time he was seen far acute kidney injury in the setting of hemodynamic perturbations  Today, patient presents for follow-up of hospitalization with acute kidney injury  Denies any acute complaints  Blood pressure is 150/82 with a heart rate of 58  Did not yet take medications  Patient does monitor blood pressures at home and reports SBPs 140s or less over DBPs 80s, per home health nurse readings  Denies headaches, lightheadedness, dizziness  Patient reports adherence with antihypertensive regimen and denies adverse effects: furosemide 40 mg daily, metoprolol succinate 100 mg daily  Patient denies lower extremity swelling or SOB  Reviewed and discussed the results of labs performed 10/24/2022 with improvement in creatinine to 1 19 mg/dL estimated GFR 54 mL/min  History is obtained from patient and son who is power of   The following portions of the patient's history were reviewed and updated as appropriate: allergies, current medications, past family history, past medical history, past social history, past surgical history, and problem list     Review of Systems   Constitutional: Negative for activity change, chills, diaphoresis, fatigue and fever  HENT: Negative for mouth sores and trouble swallowing  Respiratory: Negative for apnea, cough, chest tightness, shortness of breath and wheezing  Cardiovascular: Negative for chest pain, palpitations and leg swelling  Gastrointestinal: Negative for abdominal distention, abdominal pain, blood in stool, constipation, diarrhea and nausea  Genitourinary: Negative for decreased urine volume, difficulty urinating, dysuria, enuresis, frequency, hematuria and urgency  Musculoskeletal: Negative for arthralgias, back pain and joint swelling  Skin: Negative for pallor, rash and wound     Neurological: Negative for dizziness, seizures, light-headedness, numbness and headaches  Hematological: Does not bruise/bleed easily  Psychiatric/Behavioral: Negative for agitation, behavioral problems and confusion  The patient is not nervous/anxious  Objective:      /82   Pulse 58   Ht 6' (1 829 m)   Wt 109 kg (239 lb 3 2 oz)   SpO2 98%   BMI 32 44 kg/m²          Physical Exam  Vitals and nursing note reviewed  Constitutional:       General: He is awake  He is not in acute distress  Appearance: Normal appearance  He is well-developed and normal weight  He is not ill-appearing, toxic-appearing or diaphoretic  HENT:      Head: Normocephalic and atraumatic  Jaw: There is normal jaw occlusion  Nose: Nose normal       Mouth/Throat:      Mouth: Mucous membranes are moist       Pharynx: Oropharynx is clear  No oropharyngeal exudate or posterior oropharyngeal erythema  Eyes:      General: Lids are normal  Vision grossly intact  Gaze aligned appropriately  No scleral icterus  Right eye: No discharge  Left eye: No discharge  Extraocular Movements: Extraocular movements intact  Conjunctiva/sclera: Conjunctivae normal       Pupils: Pupils are equal, round, and reactive to light  Neck:      Thyroid: No thyroid mass or thyromegaly  Trachea: Trachea and phonation normal    Cardiovascular:      Rate and Rhythm: Normal rate and regular rhythm  Heart sounds: Normal heart sounds, S1 normal and S2 normal  No murmur heard  No friction rub  No gallop  Pulmonary:      Effort: Pulmonary effort is normal  No respiratory distress  Breath sounds: Normal breath sounds  No stridor  No wheezing, rhonchi or rales  Abdominal:      General: Abdomen is flat  Bowel sounds are normal  There is no distension  Palpations: Abdomen is soft  There is no mass  Tenderness: There is no abdominal tenderness  There is no guarding  Hernia: No hernia is present     Musculoskeletal:         General: Normal range of motion  Cervical back: Normal range of motion and neck supple  No rigidity or tenderness  Right lower leg: No edema  Left lower leg: No edema  Lymphadenopathy:      Cervical: No cervical adenopathy  Skin:     General: Skin is warm and dry  Coloration: Skin is not jaundiced  Findings: No bruising  Nails: There is no clubbing  Neurological:      General: No focal deficit present  Mental Status: He is alert and oriented to person, place, and time  Mental status is at baseline  Psychiatric:         Attention and Perception: Attention and perception normal          Mood and Affect: Mood and affect normal          Speech: Speech normal          Behavior: Behavior normal  Behavior is cooperative  Thought Content:  Thought content normal          Judgment: Judgment normal              Lab Results   Component Value Date     09/22/2015    SODIUM 143 10/24/2022    K 3 5 10/24/2022     10/24/2022    CO2 36 (H) 10/24/2022    ANIONGAP 4 09/22/2015    AGAP 6 10/24/2022    BUN 21 10/24/2022    CREATININE 1 19 10/24/2022    GLUC 98 10/17/2022    GLUF 104 (H) 10/24/2022    CALCIUM 9 0 10/24/2022    AST 97 (H) 10/13/2022     (H) 10/13/2022    ALKPHOS 67 10/13/2022    TP 6 5 10/13/2022    TBILI 1 57 (H) 10/13/2022    EGFR 54 10/24/2022      Lab Results   Component Value Date    CREATININE 1 19 10/24/2022    CREATININE 1 33 (H) 10/17/2022    CREATININE 1 83 (H) 10/16/2022    CREATININE 1 88 (H) 10/15/2022    CREATININE 1 60 (H) 10/14/2022    CREATININE 1 29 10/13/2022    CREATININE 1 04 10/01/2018    CREATININE 1 10 09/22/2015      Lab Results   Component Value Date    COLORU Yellow 10/13/2022    CLARITYU Clear 10/13/2022    SPECGRAV 1 020 10/13/2022    PHUR 5 5 10/13/2022    LEUKOCYTESUR 1+ (A) 10/13/2022    NITRITE Negative 10/13/2022    PROTEIN UA Trace (A) 10/13/2022    GLUCOSEU Negative 10/13/2022    KETONESU Negative 10/13/2022    UROBILINOGEN 0 2 10/13/2022    BILIRUBINUR Negative 10/13/2022    BLOODU Negative 10/13/2022    RBCUA None Seen 10/13/2022    WBCUA 2-4 10/13/2022    EPIS Occasional 10/13/2022    BACTERIA Occasional 10/13/2022      No results found for: LABPROT  No results found for: Gayathri Marylou Results   Component Value Date    WBC 7 30 10/17/2022    HGB 15 2 10/17/2022    HCT 48 9 10/17/2022     (H) 10/17/2022     10/17/2022      Lab Results   Component Value Date    HGB 15 2 10/17/2022    HGB 14 5 10/16/2022    HGB 14 8 10/15/2022    HGB 15 1 10/14/2022    HGB 15 2 10/13/2022      No results found for: IRON, TIBC, FERRITIN   No results found for: PTHCALCIUM, [id number], PHOSPHORUS   Lab Results   Component Value Date    CHOLESTEROL 139 10/01/2018    HDL 45 10/01/2018    LDLCALC 65 10/01/2018    TRIG 143 10/01/2018      No results found for: URICACID   Lab Results   Component Value Date    HGBA1C 6 1 10/01/2018      No results found for: TSHANTIBODY, O6CHHPS, FREET4   No results found for: SANTANA, DSDNAAB, RFIGM   No results found for: PROT, UPEP, IMMUNOFIX, KAPPALAMBDA, KAPPALIGHT     Portions of the record may have been created with voice recognition software  Occasional wrong word or "sound a like" substitutions may have occurred due to the inherent limitations of voice recognition software  Read the chart carefully and recognize, using context, where substitutions have occurred  If you have any questions, please contact the dictating provider

## 2022-10-26 ENCOUNTER — HOME CARE VISIT (OUTPATIENT)
Dept: HOME HEALTH SERVICES | Facility: HOME HEALTHCARE | Age: 87
End: 2022-10-26
Payer: MEDICARE

## 2022-10-26 VITALS
SYSTOLIC BLOOD PRESSURE: 140 MMHG | TEMPERATURE: 97.8 F | HEART RATE: 76 BPM | DIASTOLIC BLOOD PRESSURE: 78 MMHG | RESPIRATION RATE: 18 BRPM | OXYGEN SATURATION: 98 %

## 2022-10-26 VITALS — HEART RATE: 82 BPM | DIASTOLIC BLOOD PRESSURE: 78 MMHG | SYSTOLIC BLOOD PRESSURE: 122 MMHG | OXYGEN SATURATION: 98 %

## 2022-10-26 NOTE — CASE COMMUNICATION
During PT visit on 10/24 son reported patient had a fall on Suhas 10/23  Patient was stepping on scale and scale tipped  Patient fell against closet doors  Denies injury or need to be assessed  Son reports checked patient for brusises/injury and none found  PT educated with use of scale in kitchen and countertop support as stepping on/off same and to be sure of placing foot fully on so as not to tip scale

## 2022-10-27 ENCOUNTER — HOME CARE VISIT (OUTPATIENT)
Dept: HOME HEALTH SERVICES | Facility: HOME HEALTHCARE | Age: 87
End: 2022-10-27
Payer: MEDICARE

## 2022-10-27 VITALS — HEART RATE: 70 BPM | SYSTOLIC BLOOD PRESSURE: 136 MMHG | DIASTOLIC BLOOD PRESSURE: 70 MMHG | OXYGEN SATURATION: 95 %

## 2022-10-27 PROCEDURE — G0151 HHCP-SERV OF PT,EA 15 MIN: HCPCS

## 2022-10-28 ENCOUNTER — HOME CARE VISIT (OUTPATIENT)
Dept: HOME HEALTH SERVICES | Facility: HOME HEALTHCARE | Age: 87
End: 2022-10-28

## 2022-10-28 VITALS — HEART RATE: 58 BPM | OXYGEN SATURATION: 95 % | SYSTOLIC BLOOD PRESSURE: 122 MMHG | DIASTOLIC BLOOD PRESSURE: 78 MMHG

## 2022-10-29 LAB — DIGITOXIN SERPL-MCNC: <5 NG/ML (ref 10–25)

## 2022-10-31 ENCOUNTER — HOME CARE VISIT (OUTPATIENT)
Dept: HOME HEALTH SERVICES | Facility: HOME HEALTHCARE | Age: 87
End: 2022-10-31

## 2022-11-01 ENCOUNTER — HOME CARE VISIT (OUTPATIENT)
Dept: HOME HEALTH SERVICES | Facility: HOME HEALTHCARE | Age: 87
End: 2022-11-01

## 2022-11-01 VITALS
SYSTOLIC BLOOD PRESSURE: 130 MMHG | DIASTOLIC BLOOD PRESSURE: 60 MMHG | HEART RATE: 88 BPM | BODY MASS INDEX: 32.95 KG/M2 | TEMPERATURE: 98.8 F | OXYGEN SATURATION: 98 % | WEIGHT: 242.95 LBS | RESPIRATION RATE: 18 BRPM

## 2022-11-01 VITALS — DIASTOLIC BLOOD PRESSURE: 70 MMHG | HEART RATE: 62 BPM | SYSTOLIC BLOOD PRESSURE: 110 MMHG | OXYGEN SATURATION: 95 %

## 2022-11-01 VITALS — OXYGEN SATURATION: 95 % | DIASTOLIC BLOOD PRESSURE: 80 MMHG | SYSTOLIC BLOOD PRESSURE: 120 MMHG | HEART RATE: 60 BPM

## 2022-11-02 ENCOUNTER — HOME CARE VISIT (OUTPATIENT)
Dept: HOME HEALTH SERVICES | Facility: HOME HEALTHCARE | Age: 87
End: 2022-11-02

## 2022-11-02 ENCOUNTER — OFFICE VISIT (OUTPATIENT)
Dept: CARDIOLOGY CLINIC | Facility: CLINIC | Age: 87
End: 2022-11-02

## 2022-11-02 VITALS — DIASTOLIC BLOOD PRESSURE: 62 MMHG | SYSTOLIC BLOOD PRESSURE: 122 MMHG | HEART RATE: 63 BPM | OXYGEN SATURATION: 98 %

## 2022-11-02 VITALS
DIASTOLIC BLOOD PRESSURE: 62 MMHG | SYSTOLIC BLOOD PRESSURE: 124 MMHG | WEIGHT: 235.6 LBS | OXYGEN SATURATION: 94 % | HEART RATE: 55 BPM | TEMPERATURE: 97.7 F | HEIGHT: 73 IN | BODY MASS INDEX: 31.23 KG/M2

## 2022-11-02 DIAGNOSIS — I48.91 ATRIAL FIBRILLATION, UNSPECIFIED TYPE (HCC): Primary | ICD-10-CM

## 2022-11-02 DIAGNOSIS — I10 PRIMARY HYPERTENSION: ICD-10-CM

## 2022-11-02 DIAGNOSIS — I48.91 ATRIAL FIBRILLATION WITH RAPID VENTRICULAR RESPONSE (HCC): ICD-10-CM

## 2022-11-02 DIAGNOSIS — I25.10 CORONARY ARTERY DISEASE INVOLVING NATIVE CORONARY ARTERY OF NATIVE HEART WITHOUT ANGINA PECTORIS: ICD-10-CM

## 2022-11-02 DIAGNOSIS — I50.20 HFREF (HEART FAILURE WITH REDUCED EJECTION FRACTION) (HCC): ICD-10-CM

## 2022-11-02 NOTE — PROGRESS NOTES
Cardiology Consultation     Marion Wick  [id number]  1935  [facility] [address] [address] [phone]      1  Atrial fibrillation, unspecified type ([address] )     2  HFrEF (heart failure with reduced ejection fraction) ([facility] [address] )     3  Primary hypertension     4  Coronary artery disease involving native coronary artery of native heart without angina pectoris     5  Atrial fibrillation with rapid ventricular response (HCC)  apixaban (ELIQUIS) 5 mg       Discussion/Summary:  1  Heart failure with reduced ejection fraction  2  Hypertension  3  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  4  Atrial fibrillation on oral anticoagulation  5   Obesity    -transthoracic echocardiogram 10/14/2022 showing left ventricular systolic function severely reduced estimated LVEF 10-15% with reduced right ventricular systolic function and dilated left and right atria with mild aortic stenosis however possibly underestimated due to reduced LV function   -as patient's renal function has improved and weight is greater than 60 kg will increase Eliquis back to 5 mg twice daily  -if renal function remains stable and patient does not have issues with hypotension on current regimen will even consider reinitiation of ACE-inhibitor   -patient's son agree still that invasive therapies or aggressive strategies are not within his wishes are keeping and therefore will continue medical therapy as patient tolerates  -will check transthoracic echocardiogram in 2 months prior to next office visit to hopefully see improvement as patient appears much better rate controlled at this time on current medical regimen   -patient will continue Eliquis 5 mg twice daily, digoxin 125 mcg daily, metoprolol succinate 100 mg daily and can continue furosemide and potassium supplementation per Nephrology recommendations  -patient counseled on dietary lifestyle modifications including fluid restriction to less than 1800 mL of fluid daily and sodium restriction to less than 1800 mg of sodium daily  -will see patient in 2 months or sooner if necessary once testing is completed  -patient's son counseled if he were to have any warning or alarm type symptoms he is to seek emergency medical care immediately        History of Present Illness:  -patient is an 49-year-old male with history CVA in 2015 with loop recorder implantation at that time with explantation in 2018, prior coronary artery disease with MI and LAD stenting with documented ischemic cardiomyopathy EF 40% initially with improved on transesophageal echocardiogram in 2018 along with hypertension, hyperlipidemia who had been previously followed by Dr Toya Brewster and then was hospitalized at [place] [address] in October of 2022 found at that time to have significant cardiomyopathy along with atrial fibrillation with rapid ventricular response  Unfortunately due to renal dysfunction Eliquis was reduced from 5 mg twice daily to 2 5 mg twice daily during that hospital admission  Patient was initiated on rate control strategy and after being seen and evaluated by Nephrology was able to be initiated on diuretic regimen as well  He was eventually able to be discharged to outpatient follow-up and presents to the office today   -he presents with his son Marylene Staff and they note that overall patient has been doing reasonably well since hospital discharge  He has been attempting to stick to fluid and salt restricted diet has been taking medications as instructed and has been slowly getting stronger  Patient denies any active chest pain, palpitations, lightheadedness or dizziness, loss of consciousness or shortness of breath and overall feels reasonably well  He has been working with physical therapy and occupational therapy and has been doing better    He is still using nasal cannula oxygen at night with sleeping approximately 3 L  Patient Active Problem List   Diagnosis   • Atrial fibrillation with RVR (HCC)   • Acute systolic congestive heart failure (HCC)   • CAD (coronary artery disease), native coronary artery   • History of CVA (cerebrovascular accident)   • Benign essential HTN   • Hallucinations   • Chronic respiratory failure with hypoxia (HCC)   • DANIEL (acute kidney injury) ([facility] [address] )   • Bacteremia   • Goals of care, counseling/discussion   • Chronic kidney disease, stage 3a ([facility] [address] )     No past medical history on file  Social History     Socioeconomic History   • Marital status: /Civil Union     Spouse name: Not on file   • Number of children: Not on file   • Years of education: Not on file   • Highest education level: Not on file   Occupational History   • Not on file   Tobacco Use   • Smoking status: Never Smoker   • Smokeless tobacco: Never Used   Vaping Use   • Vaping Use: Never used   Substance and Sexual Activity   • Alcohol use: Not Currently   • Drug use: Never   • Sexual activity: Not Currently   Other Topics Concern   • Not on file   Social History Narrative   • Not on file     Social Determinants of Health     Financial Resource Strain: Not on file   Food Insecurity: No Food Insecurity   • Worried About Running Out of Food in the Last Year: Never true   • Ran Out of Food in the Last Year: Never true   Transportation Needs: No Transportation Needs   • Lack of Transportation (Medical): No   • Lack of Transportation (Non-Medical): No   Physical Activity: Not on file   Stress: Not on file   Social Connections: Not on file   Intimate Partner Violence: Not on file   Housing Stability: Low Risk    • Unable to Pay for Housing in the Last Year: No   • Number of Places Lived in the Last Year: 1   • Unstable Housing in the Last Year: No      No family history on file  No past surgical history on file      Current Outpatient Medications:   •  apixaban (ELIQUIS) 5 mg, Take 1 tablet (5 mg total) by mouth 2 (two) times a day, Disp: 180 tablet, Rfl: 2  •  Cholecalciferol 50 MCG (2000 UT) CAPS, Take 1 capsule by mouth 2 (two) times a day, Disp: , Rfl:   •  digoxin (LANOXIN) 0 125 mg tablet, Take 1 tablet (125 mcg total) by mouth daily, Disp: 30 tablet, Rfl: 0  •  furosemide (LASIX) 40 mg tablet, Take 1 tablet (40 mg total) by mouth daily Do not start before October 17, 2022 , Disp: 30 tablet, Rfl: 0  •  metoprolol succinate (TOPROL-XL) 100 mg 24 hr tablet, Take 1 tablet (100 mg total) by mouth daily Do not start before October 18, 2022 , Disp: 30 tablet, Rfl: 0  •  pantoprazole (PROTONIX) 40 mg tablet, Take 40 mg by mouth daily, Disp: , Rfl:   •  potassium chloride (Klor-Con) 10 mEq tablet, Take 10 mEq by mouth 2 (two) times a day  Indications: Low Amount of Potassium in the Blood, Disp: , Rfl:   •  QUEtiapine (SEROquel) 25 mg tablet, Take 50 mg by mouth, Disp: , Rfl:   •  sertraline (ZOLOFT) 50 mg tablet, Take 100 mg by mouth daily, Disp: , Rfl:   •  simvastatin (ZOCOR) 40 mg tablet, Take 40 mg by mouth, Disp: , Rfl:   •  aspirin (ECOTRIN LOW STRENGTH) 81 mg EC tablet, Take 1 tablet (81 mg total) by mouth daily Do not start before October 17, 2022   (Patient not taking: No sig reported), Disp: 30 tablet, Rfl: 0  •  folic acid (FOLVITE) 1 mg tablet, Take 1 mg by mouth daily (Patient not taking: No sig reported), Disp: , Rfl:   •  folic acid-pyridoxine-cyanocobalamin 2 5-25-2 mg, Take 1 tablet by mouth daily (Patient not taking: No sig reported), Disp: , Rfl:   No Known Allergies      Labs:  Appointment on 10/24/2022   Component Date Value   • Sodium 10/24/2022 143    • Potassium 10/24/2022 3 5    • Chloride 10/24/2022 101    • CO2 10/24/2022 36 (A)   • ANION GAP 10/24/2022 6    • BUN 10/24/2022 21    • Creatinine 10/24/2022 1 19    • Glucose, Fasting 10/24/2022 104 (A)   • Calcium 10/24/2022 9 0    • eGFR 10/24/2022 54    • Digitoxin Lvl 10/24/2022 <5 0 (A)   No results displayed because visit has over 200 results  Imaging: XR chest 1 view portable    Result Date: 10/13/2022  Narrative: CHEST INDICATION:   weakness  COMPARISON:  None EXAM PERFORMED/VIEWS:  XR CHEST PORTABLE FINDINGS: Heart at upper limit of normal in size  Moderate pulmonary edema with moderate right and small left effusion  No pneumothorax  Osseous structures appear within normal limits for patient age  Impression: Moderate pulmonary edema with moderate right and small left effusion  Workstation performed: [id number]     Echo complete    Result Date: 10/14/2022  Narrative: •  Left Ventricle: Left ventricular cavity size is dilated  Wall thickness is normal  The left ventricular ejection fraction is 10-15%  Systolic function is severely reduced  There is severe global hypokinesis  Unable to assess diastolic function due to atrial fibrillation  •  Right Ventricle: Right ventricular cavity size is mildly dilated  Systolic function is reduced  Abnormal tricuspid annular plane systolic excursion (TAPSE) < 1 7 cm  •  Left Atrium: The atrium is moderately dilated  •  Right Atrium: The atrium is moderately dilated  •  Aortic Valve: The leaflets are thickened  The leaflets are moderately calcified  There is moderately reduced mobility  There is mild stenosis however may be underestimated in the setting of significantly reduced left ventricular function  •  Mitral Valve: There is annular calcification  There is mild regurgitation  •  Tricuspid Valve: There is mild regurgitation  •  Pulmonic Valve: There is mild regurgitation  Review of Systems:  Review of Systems   Constitutional: Negative for chills, diaphoresis, fatigue and fever  HENT: Negative for trouble swallowing and voice change  Eyes: Negative for pain and redness  Respiratory: Negative for shortness of breath and wheezing  Cardiovascular: Negative for chest pain, palpitations and leg swelling     Gastrointestinal: Negative for abdominal pain, blood in stool, constipation, diarrhea, nausea and vomiting  Genitourinary: Negative for dysuria  Musculoskeletal: Positive for arthralgias  Negative for neck pain and neck stiffness  Skin: Negative for rash  Neurological: Negative for dizziness, syncope, light-headedness and headaches  Psychiatric/Behavioral: Negative for agitation  All other systems reviewed and are negative  Vitals:    11/02/22 1544   BP: 124/62   BP Location: Left arm   Patient Position: Sitting   Cuff Size: Standard   Pulse: 55   Temp: 97 7 °F (36 5 °C)   SpO2: 94%   Weight: 107 kg (235 lb 9 6 oz)   Height: 6' 1" (1 854 m)     Vitals:    11/02/22 1544   Weight: 107 kg (235 lb 9 6 oz)     Height: 6' 1" (185 4 cm)     Physical Exam:  Physical Exam  Vitals reviewed  Constitutional:       General: He is not in acute distress  Appearance: He is obese  He is not diaphoretic  HENT:      Head: Normocephalic and atraumatic  Eyes:      General:         Right eye: No discharge  Left eye: No discharge  Neck:      Comments: Trachea midline, no JVD present  Cardiovascular:      Heart sounds: Murmur heard  No friction rub  Comments: Irregularly irregular rate and rhythm  Pulmonary:      Effort: No respiratory distress  Breath sounds: No wheezing or rhonchi  Chest:      Chest wall: No tenderness  Abdominal:      General: Bowel sounds are normal       Palpations: Abdomen is soft  Tenderness: There is no abdominal tenderness  There is no rebound  Musculoskeletal:      Right lower leg: Edema (Trace) present  Left lower leg: Edema ( trace) present  Skin:     General: Skin is warm and dry  Neurological:      Mental Status: He is alert        Comments: Awake, alert, able to move extremities bilaterally, able to answer simple questions, hard of hearing   Psychiatric:      Comments: Pleasant mood, pleasant affect

## 2022-11-03 ENCOUNTER — HOME CARE VISIT (OUTPATIENT)
Dept: HOME HEALTH SERVICES | Facility: HOME HEALTHCARE | Age: 87
End: 2022-11-03

## 2022-11-04 VITALS — SYSTOLIC BLOOD PRESSURE: 122 MMHG | OXYGEN SATURATION: 96 % | HEART RATE: 53 BPM | DIASTOLIC BLOOD PRESSURE: 62 MMHG

## 2022-11-07 ENCOUNTER — HOME CARE VISIT (OUTPATIENT)
Dept: HOME HEALTH SERVICES | Facility: HOME HEALTHCARE | Age: 87
End: 2022-11-07

## 2022-11-07 VITALS — DIASTOLIC BLOOD PRESSURE: 64 MMHG | HEART RATE: 64 BPM | OXYGEN SATURATION: 95 % | SYSTOLIC BLOOD PRESSURE: 110 MMHG

## 2022-11-08 ENCOUNTER — HOME CARE VISIT (OUTPATIENT)
Dept: HOME HEALTH SERVICES | Facility: HOME HEALTHCARE | Age: 87
End: 2022-11-08

## 2022-11-09 VITALS — DIASTOLIC BLOOD PRESSURE: 70 MMHG | SYSTOLIC BLOOD PRESSURE: 110 MMHG | HEART RATE: 59 BPM | OXYGEN SATURATION: 95 %

## 2022-11-11 VITALS
HEART RATE: 62 BPM | OXYGEN SATURATION: 98 % | RESPIRATION RATE: 18 BRPM | DIASTOLIC BLOOD PRESSURE: 70 MMHG | TEMPERATURE: 97.9 F | SYSTOLIC BLOOD PRESSURE: 118 MMHG

## 2022-11-11 NOTE — CASE COMMUNICATION
OT to discharge patient following visit on 11/7/22 at patients request with goals met one visit ahead of initial plan of care  Patient notes being comfortable with current upper extremely strength and safety with performance of daily activities  Patient denies need for additional ADL training and is comfortable with discharge from [facility] OT services

## 2022-11-14 DIAGNOSIS — I50.9 ACUTE EXACERBATION OF CHF (CONGESTIVE HEART FAILURE) (HCC): ICD-10-CM

## 2022-11-14 DIAGNOSIS — I48.91 ATRIAL FIBRILLATION WITH RAPID VENTRICULAR RESPONSE (HCC): ICD-10-CM

## 2022-11-14 RX ORDER — METOPROLOL SUCCINATE 100 MG/1
100 TABLET, EXTENDED RELEASE ORAL DAILY
Qty: 90 TABLET | Refills: 3 | Status: SHIPPED | OUTPATIENT
Start: 2022-11-14 | End: 2022-12-14

## 2022-11-14 RX ORDER — FUROSEMIDE 40 MG/1
40 TABLET ORAL DAILY
Qty: 90 TABLET | Refills: 3 | Status: SHIPPED | OUTPATIENT
Start: 2022-11-14 | End: 2022-12-14

## 2022-11-14 RX ORDER — DIGOXIN 125 MCG
125 TABLET ORAL DAILY
Qty: 90 TABLET | Refills: 3 | Status: SHIPPED | OUTPATIENT
Start: 2022-11-14 | End: 2022-12-14

## 2022-12-01 ENCOUNTER — TELEPHONE (OUTPATIENT)
Dept: CARDIOLOGY CLINIC | Facility: CLINIC | Age: 87
End: 2022-12-01

## 2022-12-01 ENCOUNTER — APPOINTMENT (EMERGENCY)
Dept: RADIOLOGY | Facility: HOSPITAL | Age: 87
End: 2022-12-01

## 2022-12-01 ENCOUNTER — HOSPITAL ENCOUNTER (OUTPATIENT)
Facility: HOSPITAL | Age: 87
Setting detail: OBSERVATION
Discharge: HOME | End: 2022-12-02
Attending: EMERGENCY MEDICINE | Admitting: HOSPITALIST

## 2022-12-01 DIAGNOSIS — R60.0 BILATERAL LOWER EXTREMITY EDEMA: Primary | ICD-10-CM

## 2022-12-01 DIAGNOSIS — I50.9 CHF EXACERBATION (HCC): ICD-10-CM

## 2022-12-01 DIAGNOSIS — R53.83 OTHER FATIGUE: Primary | ICD-10-CM

## 2022-12-01 PROBLEM — R53.1 GENERALIZED WEAKNESS: Status: ACTIVE | Noted: 2022-12-01

## 2022-12-01 PROBLEM — I50.22 CHRONIC SYSTOLIC CONGESTIVE HEART FAILURE (HCC): Status: ACTIVE | Noted: 2022-01-01

## 2022-12-01 PROBLEM — R26.2 AMBULATORY DYSFUNCTION: Status: ACTIVE | Noted: 2022-12-01

## 2022-12-01 LAB
2HR DELTA HS TROPONIN: -3 NG/L
ALBUMIN SERPL BCP-MCNC: 3.4 G/DL (ref 3.5–5)
ALP SERPL-CCNC: 65 U/L (ref 34–104)
ALT SERPL W P-5'-P-CCNC: 22 U/L (ref 7–52)
ANION GAP SERPL CALCULATED.3IONS-SCNC: 5 MMOL/L (ref 4–13)
AST SERPL W P-5'-P-CCNC: 28 U/L (ref 13–39)
BASOPHILS # BLD AUTO: 0.02 THOUSANDS/ÂΜL (ref 0–0.1)
BASOPHILS NFR BLD AUTO: 0 % (ref 0–1)
BILIRUB DIRECT SERPL-MCNC: 0.46 MG/DL (ref 0–0.2)
BILIRUB SERPL-MCNC: 1.71 MG/DL (ref 0.2–1)
BNP SERPL-MCNC: 1531 PG/ML (ref 0–100)
BUN SERPL-MCNC: 16 MG/DL (ref 5–25)
CALCIUM SERPL-MCNC: 9.2 MG/DL (ref 8.4–10.2)
CARDIAC TROPONIN I PNL SERPL HS: 24 NG/L
CARDIAC TROPONIN I PNL SERPL HS: 27 NG/L
CHLORIDE SERPL-SCNC: 98 MMOL/L (ref 96–108)
CO2 SERPL-SCNC: 39 MMOL/L (ref 21–32)
CREAT SERPL-MCNC: 1.07 MG/DL (ref 0.6–1.3)
EOSINOPHIL # BLD AUTO: 0.21 THOUSAND/ÂΜL (ref 0–0.61)
EOSINOPHIL NFR BLD AUTO: 3 % (ref 0–6)
ERYTHROCYTE [DISTWIDTH] IN BLOOD BY AUTOMATED COUNT: 15.4 % (ref 11.6–15.1)
FLUAV RNA RESP QL NAA+PROBE: NEGATIVE
FLUBV RNA RESP QL NAA+PROBE: NEGATIVE
GFR SERPL CREATININE-BSD FRML MDRD: 62 ML/MIN/1.73SQ M
GLUCOSE SERPL-MCNC: 158 MG/DL (ref 65–140)
HCT VFR BLD AUTO: 52.2 % (ref 36.5–49.3)
HGB BLD-MCNC: 16.2 G/DL (ref 12–17)
IMM GRANULOCYTES # BLD AUTO: 0.01 THOUSAND/UL (ref 0–0.2)
IMM GRANULOCYTES NFR BLD AUTO: 0 % (ref 0–2)
LYMPHOCYTES # BLD AUTO: 1.52 THOUSANDS/ÂΜL (ref 0.6–4.47)
LYMPHOCYTES NFR BLD AUTO: 20 % (ref 14–44)
MCH RBC QN AUTO: 31.2 PG (ref 26.8–34.3)
MCHC RBC AUTO-ENTMCNC: 31 G/DL (ref 31.4–37.4)
MCV RBC AUTO: 100 FL (ref 82–98)
MONOCYTES # BLD AUTO: 0.6 THOUSAND/ÂΜL (ref 0.17–1.22)
MONOCYTES NFR BLD AUTO: 8 % (ref 4–12)
NEUTROPHILS # BLD AUTO: 5.17 THOUSANDS/ÂΜL (ref 1.85–7.62)
NEUTS SEG NFR BLD AUTO: 69 % (ref 43–75)
NRBC BLD AUTO-RTO: 0 /100 WBCS
PLATELET # BLD AUTO: 226 THOUSANDS/UL (ref 149–390)
PMV BLD AUTO: 10.3 FL (ref 8.9–12.7)
POTASSIUM SERPL-SCNC: 3.3 MMOL/L (ref 3.5–5.3)
PROT SERPL-MCNC: 6.5 G/DL (ref 6.4–8.4)
RBC # BLD AUTO: 5.2 MILLION/UL (ref 3.88–5.62)
RSV RNA RESP QL NAA+PROBE: NEGATIVE
SARS-COV-2 RNA RESP QL NAA+PROBE: NEGATIVE
SODIUM SERPL-SCNC: 142 MMOL/L (ref 135–147)
TSH SERPL DL<=0.05 MIU/L-ACNC: 1.67 UIU/ML (ref 0.45–4.5)
TSH SERPL DL<=0.05 MIU/L-ACNC: 1.67 UIU/ML (ref 0.45–4.5)
WBC # BLD AUTO: 7.53 THOUSAND/UL (ref 4.31–10.16)

## 2022-12-01 RX ORDER — ONDANSETRON 2 MG/ML
4 INJECTION INTRAMUSCULAR; INTRAVENOUS EVERY 6 HOURS PRN
Status: DISCONTINUED | OUTPATIENT
Start: 2022-12-01 | End: 2022-12-02 | Stop reason: HOSPADM

## 2022-12-01 RX ORDER — DIGOXIN 250 MCG
125 TABLET ORAL DAILY
Status: DISCONTINUED | OUTPATIENT
Start: 2022-12-01 | End: 2022-12-02 | Stop reason: HOSPADM

## 2022-12-01 RX ORDER — LOSARTAN POTASSIUM 25 MG/1
25 TABLET ORAL DAILY
Status: DISCONTINUED | OUTPATIENT
Start: 2022-12-01 | End: 2022-12-02 | Stop reason: HOSPADM

## 2022-12-01 RX ORDER — POTASSIUM CHLORIDE 750 MG/1
10 TABLET, EXTENDED RELEASE ORAL 2 TIMES DAILY
Status: DISCONTINUED | OUTPATIENT
Start: 2022-12-01 | End: 2022-12-02 | Stop reason: HOSPADM

## 2022-12-01 RX ORDER — ATORVASTATIN CALCIUM 40 MG/1
40 TABLET, FILM COATED ORAL
Status: DISCONTINUED | OUTPATIENT
Start: 2022-12-01 | End: 2022-12-02 | Stop reason: HOSPADM

## 2022-12-01 RX ORDER — ACETAMINOPHEN 325 MG/1
650 TABLET ORAL EVERY 6 HOURS PRN
Status: DISCONTINUED | OUTPATIENT
Start: 2022-12-01 | End: 2022-12-02 | Stop reason: HOSPADM

## 2022-12-01 RX ORDER — METOPROLOL SUCCINATE 50 MG/1
100 TABLET, EXTENDED RELEASE ORAL DAILY
Status: DISCONTINUED | OUTPATIENT
Start: 2022-12-01 | End: 2022-12-02 | Stop reason: HOSPADM

## 2022-12-01 RX ORDER — DOCUSATE SODIUM 100 MG/1
100 CAPSULE, LIQUID FILLED ORAL 2 TIMES DAILY
Status: DISCONTINUED | OUTPATIENT
Start: 2022-12-01 | End: 2022-12-02 | Stop reason: HOSPADM

## 2022-12-01 RX ORDER — PANTOPRAZOLE SODIUM 40 MG/1
40 TABLET, DELAYED RELEASE ORAL DAILY
Status: DISCONTINUED | OUTPATIENT
Start: 2022-12-02 | End: 2022-12-02 | Stop reason: HOSPADM

## 2022-12-01 RX ORDER — FUROSEMIDE 10 MG/ML
40 INJECTION INTRAMUSCULAR; INTRAVENOUS
Status: DISCONTINUED | OUTPATIENT
Start: 2022-12-02 | End: 2022-12-02

## 2022-12-01 RX ORDER — SPIRONOLACTONE 25 MG/1
25 TABLET ORAL DAILY
Status: DISCONTINUED | OUTPATIENT
Start: 2022-12-01 | End: 2022-12-02 | Stop reason: HOSPADM

## 2022-12-01 RX ORDER — FUROSEMIDE 10 MG/ML
40 INJECTION INTRAMUSCULAR; INTRAVENOUS ONCE
Status: COMPLETED | OUTPATIENT
Start: 2022-12-01 | End: 2022-12-01

## 2022-12-01 RX ORDER — POTASSIUM CHLORIDE 20 MEQ/1
40 TABLET, EXTENDED RELEASE ORAL ONCE
Status: COMPLETED | OUTPATIENT
Start: 2022-12-01 | End: 2022-12-01

## 2022-12-01 RX ORDER — SODIUM CHLORIDE 9 MG/ML
3 INJECTION INTRAVENOUS
Status: DISCONTINUED | OUTPATIENT
Start: 2022-12-01 | End: 2022-12-02 | Stop reason: HOSPADM

## 2022-12-01 RX ORDER — FUROSEMIDE 10 MG/ML
40 INJECTION INTRAMUSCULAR; INTRAVENOUS
Status: DISCONTINUED | OUTPATIENT
Start: 2022-12-01 | End: 2022-12-01

## 2022-12-01 RX ORDER — POLYETHYLENE GLYCOL 3350 17 G/17G
17 POWDER, FOR SOLUTION ORAL DAILY
Status: DISCONTINUED | OUTPATIENT
Start: 2022-12-01 | End: 2022-12-02 | Stop reason: HOSPADM

## 2022-12-01 RX ADMIN — SERTRALINE HYDROCHLORIDE 100 MG: 50 TABLET ORAL at 19:15

## 2022-12-01 RX ADMIN — POTASSIUM CHLORIDE 40 MEQ: 1500 TABLET, EXTENDED RELEASE ORAL at 14:33

## 2022-12-01 RX ADMIN — FUROSEMIDE 40 MG: 10 INJECTION, SOLUTION INTRAMUSCULAR; INTRAVENOUS at 15:22

## 2022-12-01 RX ADMIN — DOCUSATE SODIUM 100 MG: 100 CAPSULE, LIQUID FILLED ORAL at 19:15

## 2022-12-01 RX ADMIN — SPIRONOLACTONE 25 MG: 25 TABLET ORAL at 19:15

## 2022-12-01 RX ADMIN — POLYETHYLENE GLYCOL 3350 17 G: 17 POWDER, FOR SOLUTION ORAL at 19:16

## 2022-12-01 RX ADMIN — POTASSIUM CHLORIDE 10 MEQ: 750 TABLET, EXTENDED RELEASE ORAL at 19:15

## 2022-12-01 RX ADMIN — APIXABAN 5 MG: 5 TABLET, FILM COATED ORAL at 19:15

## 2022-12-01 RX ADMIN — ATORVASTATIN CALCIUM 40 MG: 40 TABLET, FILM COATED ORAL at 19:15

## 2022-12-01 NOTE — TELEPHONE ENCOUNTER
I was able to call patient's son Melony Schneider back to discuss  He notes that his father is just significantly fatigued sleeping most of the day but denies any other issue  He notes this is worsened over the past week however though and he has become concerned  In that setting I did recommend he seek emergency medical care for his father to try and determine underlying etiology although he does believe this may have been related to patient's COVID booster last week  He states that his father does not wish to go to the ER though is there any alternative plan  In that setting I have ordered laboratory studies including a digoxin level which could be a contributing factor along with BMP and CBC  I did stress to the patient's son that I do believe ER visit/evaluation would be more prudent and stay for his father but that if he does not wish to go to the ER should get above lab testing done as soon as possible  If above is relatively unrevealing will likely increased diuretic therapy for short-term to assist with lower extremity edema but patient should be evaluated in ER and patient's son noted understanding

## 2022-12-01 NOTE — RESPIRATORY THERAPY NOTE
RT Protocol Note  Ronaldo Damian 80 y o  male MRN: [id number]  Unit/Bed#: ED 12 Encounter: [id number]    Assessment    Active Problems:    Atrial fibrillation with RVR (HCC)    Chronic systolic congestive heart failure (HCC)    Chronic respiratory failure with hypoxia (HCC)    Ambulatory dysfunction    Generalized weakness      Home Pulmonary Medications:    Home Devices/Therapy: (P) Home O2    Past Medical History:   Diagnosis Date    Atrial fibrillation (HCC)     CHF (congestive heart failure) (HCC)     Hypertension      Social History     Socioeconomic History    Marital status: /Civil Union     Spouse name: None    Number of children: None    Years of education: None    Highest education level: None   Occupational History    None   Tobacco Use    Smoking status: Never    Smokeless tobacco: Never   Vaping Use    Vaping Use: Never used   Substance and Sexual Activity    Alcohol use: Not Currently    Drug use: Never    Sexual activity: Not Currently   Other Topics Concern    None   Social History Narrative    None     Social Determinants of Health     Financial Resource Strain: Not on file   Food Insecurity: No Food Insecurity    Worried About Running Out of Food in the Last Year: Never true    Ran Out of Food in the Last Year: Never true   Transportation Needs: No Transportation Needs    Lack of Transportation (Medical): No    Lack of Transportation (Non-Medical):  No   Physical Activity: Not on file   Stress: Not on file   Social Connections: Not on file   Intimate Partner Violence: Not on file   Housing Stability: Low Risk     Unable to Pay for Housing in the Last Year: No    Number of Places Lived in the Last Year: 1    Unstable Housing in the Last Year: No       Subjective         Objective    Physical Exam:   Assessment Type: Assess only  General Appearance: Awake  Respiratory Pattern: Normal  Chest Assessment: Chest expansion symmetrical  Bilateral Breath Sounds: Clear    Vitals:  Blood pressure 133/75, pulse 66, temperature 97 7 °F (36 5 °C), temperature source Tympanic, resp  rate 18, SpO2 94 %  Imaging and other studies: I have personally reviewed pertinent reports  Plan    Respiratory Plan: (P) Discontinue Protocol        Resp Comments: (P) Pt evaluated per respiratory protocal in ED as ordered  Pt admitted with weakness  Pt seen today on rma, he denies sob, and BS are clear  He does use 2l O2 HS, no pulmonary meds used at home  Pt negative for RSV/FLU/COVID  No respiratory needs at this time

## 2022-12-01 NOTE — TELEPHONE ENCOUNTER
Patient's son called  Patient has had bilateral foot edema and extreme fatigue, since last Wednesday, after he received a COVID booster, which may be coincidental    He is sleeping throughout most of the day  Denies sob or weight gain  Currently taking furosemide 40 mg daily  Please advise

## 2022-12-01 NOTE — H&P
[address] 45  H&P- Kaur Dale 1935, 80 y o  male MRN: [id number]  Unit/Bed#: ED 12 Encounter: [id number]  Primary Care Provider: Javi Garcia DO   Date and time admitted to hospital: 12/1/2022 12:27 PM    Generalized weakness  Assessment & Plan  See plan as mentioned in ambulatory dysfunction    Ambulatory dysfunction  Assessment & Plan  Secondary to generalized weakness most likely secondary to recent COVID booster  No signs symptoms of infection  Patient does have cardiomyopathy and CHF, remained clinically stable  Follow-up PT OT recommendation  Follow case management    Chronic respiratory failure with hypoxia [facility])  Assessment & Plan  Patient chronically use 3 L of oxygen at nighttime  Remained baseline    Chronic systolic congestive heart failure (HCC)  Assessment & Plan  Wt Readings from Last 3 Encounters:   11/02/22 107 kg (235 lb 9 6 oz)   11/01/22 110 kg (242 lb 15 2 oz)   10/25/22 109 kg (239 lb 3 2 oz)       As per recent echocardiogram, ejection fraction was 15-20%  On clinical exam, remained baseline  Patient does follow-up with Dr Yifan Michelle  While patient remained in hospital, will use IV Lasix b i d  And monitor labs  Due to low ejection fraction, patient placed on Aldactone and losartan-follow renal function and potassium level        Atrial fibrillation with RVR ([facility])  Assessment & Plan  Remained stable  Continue Eliquis, beta-blocker    VTE Pharmacologic Prophylaxis: VTE Score: 7 High Risk (Score >/= 5) - Pharmacological DVT Prophylaxis Ordered: apixaban (Eliquis)  Sequential Compression Devices Ordered  Code Status:  Level 3, DNR DNI as per patient family  Discussion with family: Updated  (son) at bedside  Anticipated Length of Stay: Patient will be admitted on an observation basis with an anticipated length of stay of less than 2 midnights secondary to To monitor above condition      Total Time for Visit, including Counseling / Coordination of Care: 45 minutes Greater than 50% of this total time spent on direct patient counseling and coordination of care  Chief Complaint:  Generalized weakness    History of Present Illness:  Rosita Herrmann is a 80 y o  male with a PMH of CHF, cardiomyopathy who presents with generalized weakness     As per patient's son on the bedside, patient received COVID booster short about 1 week ago and since then she is feeling fatigue and tired and sleeping more  Denies any cough, congestion, nausea, vomiting, diarrhea  Patient does use 3 L of oxygen at nighttime  Patient also unable to walk even patient does use walker  Review of Systems:  Review of Systems   Constitutional: Positive for activity change, appetite change and fatigue  Negative for chills, diaphoresis, fever and unexpected weight change  HENT: Negative for congestion, dental problem, drooling, nosebleeds, postnasal drip, rhinorrhea, sinus pain, sore throat, tinnitus, trouble swallowing and voice change  Respiratory: Negative for apnea, cough, chest tightness, shortness of breath, wheezing and stridor  Cardiovascular: Positive for leg swelling  Negative for chest pain and palpitations  Gastrointestinal: Negative for abdominal distention, abdominal pain, anal bleeding, diarrhea, nausea and rectal pain  Genitourinary: Negative for difficulty urinating, dysuria and enuresis  Musculoskeletal: Negative for arthralgias, back pain, gait problem and neck stiffness  Skin: Negative for color change, pallor and wound  Neurological: Negative for dizziness, seizures, syncope, facial asymmetry, speech difficulty and headaches  Hematological: Negative for adenopathy  Psychiatric/Behavioral: Negative for agitation, behavioral problems and confusion  All other systems reviewed and are negative        Past Medical and Surgical History:   Past Medical History:   Diagnosis Date   • Atrial fibrillation [facility])    • [facility]    • Hypertension        History reviewed  No pertinent surgical history  Meds/Allergies:  Prior to Admission medications    Medication Sig Start Date End Date Taking? Authorizing Provider   apixaban (ELIQUIS) 5 mg Take 1 tablet (5 mg total) by mouth 2 (two) times a day 11/2/22 12/2/22  Cat Lust, DO   Cholecalciferol 50 MCG (2000 UT) CAPS Take 1 capsule by mouth 2 (two) times a day    Historical Provider, MD   digoxin (LANOXIN) 0 125 mg tablet Take 1 tablet (125 mcg total) by mouth daily 11/14/22 12/14/22  Cat Lust, DO   furosemide (LASIX) 40 mg tablet Take 1 tablet (40 mg total) by mouth daily 11/14/22 12/14/22  [facility] Lust, DO   metoprolol succinate (TOPROL-XL) 100 mg 24 hr tablet Take 1 tablet (100 mg total) by mouth daily 11/14/22 12/14/22  [facility] Lust, DO   pantoprazole (PROTONIX) 40 mg tablet Take 40 mg by mouth daily    Historical Provider, MD   potassium chloride (Klor-Con) 10 mEq tablet Take 10 mEq by mouth 2 (two) times a day  Indications: Low Amount of Potassium in the Blood 10/18/22   JACOB Perez   sertraline (ZOLOFT) 50 mg tablet Take 100 mg by mouth daily 7/5/11   Historical Provider, MD   simvastatin (ZOCOR) 40 mg tablet Take 40 mg by mouth 4/19/11   Historical Provider, MD   aspirin (ECOTRIN LOW STRENGTH) 81 mg EC tablet Take 1 tablet (81 mg total) by mouth daily Do not start before October 17, 2022  Patient not taking: No sig reported 10/17/22 12/1/22  [facility]   folic acid (FOLVITE) 1 mg tablet Take 1 mg by mouth daily  Patient not taking: No sig reported  12/1/22  Historical Provider, MD   folic acid-pyridoxine-cyanocobalamin 2 5-25-2 mg Take 1 tablet by mouth daily  Patient not taking: No sig reported 8/29/12 12/1/22  Historical Provider, MD   QUEtiapine (SEROquel) 25 mg tablet Take 50 mg by mouth 8/23/22 12/1/22  Historical Provider, MD     I have reviewed home medications with patient personally      Allergies: No Known Allergies    Social History:  Marital Status: /Civil Union   Occupation:  Unknown  Patient Pre-hospital Living Situation: Home  Patient Pre-hospital Level of Mobility: walks with cane  Patient Pre-hospital Diet Restrictions:  Cardiac diet  Substance Use History:   Social History     Substance and Sexual Activity   Alcohol Use Not Currently     Social History     Tobacco Use   Smoking Status Never   Smokeless Tobacco Never     Social History     Substance and Sexual Activity   Drug Use Never       Family History:  History reviewed  No pertinent family history  Physical Exam:     Vitals:   Blood Pressure: 133/75 (12/01/22 1430)  Pulse: 66 (12/01/22 1430)  Temperature: 97 7 °F (36 5 °C) (12/01/22 1221)  Temp Source: Tympanic (12/01/22 1221)  Respirations: 18 (12/01/22 1430)  SpO2: 94 % (12/01/22 1430)    Physical Exam     Additional Data:     Lab Results:  Results from last 7 days   Lab Units 12/01/22  1315   WBC Thousand/uL 7 53   HEMOGLOBIN g/dL 16 2   HEMATOCRIT % 52 2*   PLATELETS Thousands/uL 226   NEUTROS PCT % 69   LYMPHS PCT % 20   MONOS PCT % 8   EOS PCT % 3     Results from last 7 days   Lab Units 12/01/22  1315   SODIUM mmol/L 142   POTASSIUM mmol/L 3 3*   CHLORIDE mmol/L 98   CO2 mmol/L 39*   BUN mg/dL 16   CREATININE mg/dL 1 07   ANION GAP mmol/L 5   CALCIUM mg/dL 9 2   GLUCOSE RANDOM mg/dL 158*                       Lines/Drains:  Invasive Devices     None                     Imaging: Reviewed radiology reports from this admission including: chest xray  X-ray chest 1 view portable   Final Result by June Cruz MD (12/01 1430)      Mild cardiomegaly      Mild vascular congestion  Right pleural effusion  Workstation performed: [id number]             EKG and Other Studies Reviewed on Admission:   · EKG: No significant EKG change  ** Please Note: This note has been constructed using a voice recognition system   **

## 2022-12-01 NOTE — ASSESSMENT & PLAN NOTE
Wt Readings from Last 3 Encounters:   11/02/22 107 kg (235 lb 9 6 oz)   11/01/22 110 kg (242 lb 15 2 oz)   10/25/22 109 kg (239 lb 3 2 oz)       As per recent echocardiogram, ejection fraction was 15-20%  On clinical exam, remained baseline  Patient does follow-up with Dr Aleida Cote  While patient remained in hospital, will use IV Lasix b i d   And monitor labs  Due to low ejection fraction, patient placed on Aldactone and losartan-follow renal function and potassium level

## 2022-12-01 NOTE — ASSESSMENT & PLAN NOTE
Secondary to generalized weakness most likely secondary to recent COVID booster  No signs symptoms of infection  Patient does have cardiomyopathy and CHF, remained clinically stable  Follow-up PT OT recommendation  Follow case management

## 2022-12-01 NOTE — ED PROVIDER NOTES
History  Chief Complaint   Patient presents with   • Fatigue   • Foot Swelling     Pt presents c/o fatigue and B/L foot swelling x 1 week  Fatigue x 1 week  B/l foot swelling  Left worse than right  No sob, no illness, no cp  Ambulatory issue  COVID booster last Tuesday             Prior to Admission Medications   Prescriptions Last Dose Informant Patient Reported? Taking? Cholecalciferol 50 MCG (2000 UT) CAPS   Yes No   Sig: Take 1 capsule by mouth 2 (two) times a day   QUEtiapine (SEROquel) 25 mg tablet   Yes No   Sig: Take 50 mg by mouth   apixaban (ELIQUIS) 5 mg   No No   Sig: Take 1 tablet (5 mg total) by mouth 2 (two) times a day   digoxin (LANOXIN) 0 125 mg tablet   No No   Sig: Take 1 tablet (125 mcg total) by mouth daily   furosemide (LASIX) 40 mg tablet   No No   Sig: Take 1 tablet (40 mg total) by mouth daily   metoprolol succinate (TOPROL-XL) 100 mg 24 hr tablet   No No   Sig: Take 1 tablet (100 mg total) by mouth daily   pantoprazole (PROTONIX) 40 mg tablet   Yes No   Sig: Take 40 mg by mouth daily   potassium chloride (Klor-Con) 10 mEq tablet   Yes No   Sig: Take 10 mEq by mouth 2 (two) times a day  Indications: Low Amount of Potassium in the Blood   sertraline (ZOLOFT) 50 mg tablet   Yes No   Sig: Take 100 mg by mouth daily   simvastatin (ZOCOR) 40 mg tablet   Yes No   Sig: Take 40 mg by mouth      Facility-Administered Medications: None       Past Medical History:   Diagnosis Date   • Atrial fibrillation (HCC)    • CHF (congestive heart failure) ([facility] [address] )    • Hypertension        History reviewed  No pertinent surgical history  History reviewed  No pertinent family history  I have reviewed and agree with the history as documented      E-Cigarette/Vaping   • E-Cigarette Use Never User      E-Cigarette/Vaping Substances   • Nicotine No    • THC No    • CBD No    • Flavoring No    • Other No    • Unknown No      Social History     Tobacco Use   • Smoking status: Never   • Smokeless tobacco: Never Vaping Use   • Vaping Use: Never used   Substance Use Topics   • Alcohol use: Not Currently   • Drug use: Never       Review of Systems    Physical Exam  Physical Exam    Vital Signs  ED Triage Vitals [12/01/22 1221]   Temperature Pulse Respirations Blood Pressure SpO2   97 7 °F (36 5 °C) 76 18 122/65 94 %      Temp Source Heart Rate Source Patient Position - Orthostatic VS BP Location FiO2 (%)   Tympanic Monitor Sitting Left arm --      Pain Score       --           Vitals:    12/01/22 1221   BP: 122/65   Pulse: 76   Patient Position - Orthostatic VS: Sitting         Visual Acuity      ED Medications  Medications - No data to display    Diagnostic Studies  Results Reviewed     None                 No orders to display              Procedures  ECG 12 Lead Documentation Only    Date/Time: 12/1/2022 12:42 PM  Performed by: JACOB Bennett  Authorized by: JACOB Bennett     Indications / Diagnosis:  Fatigue  ECG reviewed by me, the ED Provider: yes    Patient location:  ED  Previous ECG:     Previous ECG:  Compared to current    Similarity:  No change  Interpretation:     Interpretation: abnormal    Rate:     ECG rate:  72  Rhythm:     Rhythm: atrial fibrillation    Ectopy:     Ectopy: none    QRS:     QRS axis:  Normal  Conduction:     Conduction: abnormal      Abnormal conduction: complete RBBB and LAFB    ST segments:     ST segments:  Non-specific  T waves:     T waves: non-specific               ED Course                                             MDM    Disposition  Final diagnoses:   None     ED Disposition     None      Follow-up Information    None         Patient's Medications   Discharge Prescriptions    No medications on file       No discharge procedures on file      PDMP Review     None          ED Provider  Electronically Signed by soft  There is no mass  Tenderness: There is no abdominal tenderness  There is no guarding or rebound  Hernia: No hernia is present  Musculoskeletal:         General: Normal range of motion  Cervical back: Normal range of motion and neck supple  No rigidity or tenderness  Skin:     General: Skin is warm  Capillary Refill: Capillary refill takes less than 2 seconds  Neurological:      General: No focal deficit present  Mental Status: He is alert and oriented to person, place, and time  Mental status is at baseline     Psychiatric:         Mood and Affect: Mood normal          Vital Signs  ED Triage Vitals   Temperature Pulse Respirations Blood Pressure SpO2   12/01/22 1221 12/01/22 1221 12/01/22 1221 12/01/22 1221 12/01/22 1221   97 7 °F (36 5 °C) 76 18 122/65 94 %      Temp Source Heart Rate Source Patient Position - Orthostatic VS BP Location FiO2 (%)   12/01/22 1221 12/01/22 1221 12/01/22 1221 12/01/22 1221 --   Tympanic Monitor Sitting Left arm       Pain Score       12/01/22 2241       No Pain           Vitals:    12/02/22 1200 12/02/22 1255 12/02/22 1315 12/02/22 1330   BP:       Pulse: 67 100 (!) 108 64   Patient Position - Orthostatic VS:             Visual Acuity      ED Medications  Medications   potassium chloride (K-DUR,KLOR-CON) CR tablet 40 mEq (40 mEq Oral Given 12/1/22 1433)   furosemide (LASIX) injection 40 mg (40 mg Intravenous Given 12/1/22 1522)   potassium chloride (K-DUR,KLOR-CON) CR tablet 40 mEq (40 mEq Oral Given 12/2/22 0805)       Diagnostic Studies  Results Reviewed     Procedure Component Value Units Date/Time    TSH, 3rd generation with Free T4 reflex [[id number]]  (Normal) Collected: 12/01/22 1315    Lab Status: Final result Specimen: Blood from Arm, Left Updated: 12/01/22 1836     TSH 3RD GENERATON 1 668 uIU/mL     Narrative:      Patients undergoing fluorescein dye angiography may retain small amounts of fluorescein in the body for 48-72 hours post procedure  Samples containing fluorescein can produce falsely depressed TSH values  If the patient had this procedure,a specimen should be resubmitted post fluorescein clearance  TSH, 3rd generation [[id number]]  (Normal) Collected: 12/01/22 1315    Lab Status: Final result Specimen: Blood from Arm, Left Updated: 12/01/22 1836     TSH 3RD GENERATON 1 668 uIU/mL     Narrative:      Patients undergoing fluorescein dye angiography may retain small amounts of fluorescein in the body for 48-72 hours post procedure  Samples containing fluorescein can produce falsely depressed TSH values  If the patient had this procedure,a specimen should be resubmitted post fluorescein clearance  Hepatic function panel [[id number]]  (Abnormal) Collected: 12/01/22 1315    Lab Status: Final result Specimen: Blood from Arm, Left Updated: 12/01/22 1836     Total Bilirubin 1 71 mg/dL      Bilirubin, Direct 0 46 mg/dL      Alkaline Phosphatase 65 U/L      AST 28 U/L      ALT 22 U/L      Total Protein 6 5 g/dL      Albumin 3 4 g/dL     HS Troponin I 2hr [[id number]]  (Normal) Collected: 12/01/22 1522    Lab Status: Final result Specimen: Blood from Hand, Left Updated: 12/01/22 1606     hs TnI 2hr 24 ng/L      Delta 2hr hsTnI -3 ng/L     FLU/RSV/COVID - if FLU/RSV clinically relevant [[id number]]  (Normal) Collected: 12/01/22 1412    Lab Status: Final result Specimen: Nares from Nose Updated: 12/01/22 1459     SARS-CoV-2 Negative     INFLUENZA A PCR Negative     INFLUENZA B PCR Negative     RSV PCR Negative    Narrative:      FOR PEDIATRIC PATIENTS - copy/paste COVID Guidelines URL to browser: [email or web address]    SARS-CoV-2 assay is a Nucleic Acid Amplification assay intended for the  qualitative detection of nucleic acid from SARS-CoV-2 in nasopharyngeal  swabs  Results are for the presumptive identification of SARS-CoV-2 RNA      Positive results are indicative of infection with SARS-CoV-2, the virus  causing COVID-19, but do not rule out bacterial infection or co-infection  with other viruses  Laboratories within the [place] and its  territories are required to report all positive results to the appropriate  public health authorities  Negative results do not preclude SARS-CoV-2  infection and should not be used as the sole basis for treatment or other  patient management decisions  Negative results must be combined with  clinical observations, patient history, and epidemiological information  This test has not been FDA cleared or approved  This test has been authorized by FDA under an Emergency Use Authorization  (EUA)  This test is only authorized for the duration of time the  declaration that circumstances exist justifying the authorization of the  emergency use of an in vitro diagnostic tests for detection of SARS-CoV-2  virus and/or diagnosis of COVID-19 infection under section 564(b)(1) of  the Act, 21 U  S C  858MOC-9(K)(1), unless the authorization is terminated  or revoked sooner  The test has been validated but independent review by FDA  and CLIA is pending  Test performed using [employer] GeneXpert: This RT-PCR assay targets N2,  a region unique to SARS-CoV-2  A conserved region in the E-gene was chosen  for pan-Sarbecovirus detection which includes SARS-CoV-2  According to CMS-2020-01-R, this platform meets the definition of high-throughput technology  Digitoxin level [[id number]] Collected: 12/01/22 1428    Lab Status:  In process Specimen: Blood from Arm, Left Updated: 12/01/22 1431    HS Troponin 0hr (reflex protocol) [[id number]]  (Normal) Collected: 12/01/22 1315    Lab Status: Final result Specimen: Blood from Arm, Left Updated: 12/01/22 1353     hs TnI 0hr 27 ng/L     B-Type Natriuretic Peptide(BNP) AN, CA, EA Campuses Only [[id number]]  (Abnormal) Collected: 12/01/22 1315    Lab Status: Final result Specimen: Blood from Arm, Left Updated: 12/01/22 1352     BNP 1,531 pg/mL     Basic metabolic panel [[id number]]  (Abnormal) Collected: 12/01/22 1315    Lab Status: Final result Specimen: Blood from Arm, Left Updated: 12/01/22 1345     Sodium 142 mmol/L      Potassium 3 3 mmol/L      Chloride 98 mmol/L      CO2 39 mmol/L      ANION GAP 5 mmol/L      BUN 16 mg/dL      Creatinine 1 07 mg/dL      Glucose 158 mg/dL      Calcium 9 2 mg/dL      eGFR 62 ml/min/1 73sq m     Narrative:      [place] guidelines for Chronic Kidney Disease (CKD):   •  Stage 1 with normal or high GFR (GFR > 90 mL/min/1 73 square meters)  •  Stage 2 Mild CKD (GFR = 60-89 mL/min/1 73 square meters)  •  Stage 3A Moderate CKD (GFR = 45-59 mL/min/1 73 square meters)  •  Stage 3B Moderate CKD (GFR = 30-44 mL/min/1 73 square meters)  •  Stage 4 Severe CKD (GFR = 15-29 mL/min/1 73 square meters)  •  Stage 5 End Stage CKD (GFR <15 mL/min/1 73 square meters)  Note: GFR calculation is accurate only with a steady state creatinine    CBC and differential [[id number]]  (Abnormal) Collected: 12/01/22 1315    Lab Status: Final result Specimen: Blood from Arm, Left Updated: 12/01/22 1321     WBC 7 53 Thousand/uL      RBC 5 20 Million/uL      Hemoglobin 16 2 g/dL      Hematocrit 52 2 %       fL      MCH 31 2 pg      MCHC 31 0 g/dL      RDW 15 4 %      MPV 10 3 fL      Platelets 313 Thousands/uL      nRBC 0 /100 WBCs      Neutrophils Relative 69 %      Immat GRANS % 0 %      Lymphocytes Relative 20 %      Monocytes Relative 8 %      Eosinophils Relative 3 %      Basophils Relative 0 %      Neutrophils Absolute 5 17 Thousands/µL      Immature Grans Absolute 0 01 Thousand/uL      Lymphocytes Absolute 1 52 Thousands/µL      Monocytes Absolute 0 60 Thousand/µL      Eosinophils Absolute 0 21 Thousand/µL      Basophils Absolute 0 02 Thousands/µL                  X-ray chest 1 view portable   Final Result by Tanesha Levine MD (12/01 1430)      Mild cardiomegaly      Mild vascular congestion  Right pleural effusion  Workstation performed: [id number]                    Procedures  ECG 12 Lead Documentation Only    Date/Time: 12/1/2022 12:42 PM  Performed by: JACOB George  Authorized by: JACOB George     Indications / Diagnosis:  Fatigue  ECG reviewed by me, the ED Provider: yes    Patient location:  ED  Previous ECG:     Previous ECG:  Compared to current    Similarity:  No change  Interpretation:     Interpretation: abnormal    Rate:     ECG rate:  72  Rhythm:     Rhythm: atrial fibrillation    Ectopy:     Ectopy: none    QRS:     QRS axis:  Normal  Conduction:     Conduction: abnormal      Abnormal conduction: complete RBBB and LAFB    ST segments:     ST segments:  Non-specific  T waves:     T waves: non-specific               ED Course             HEART Risk Score    Flowsheet Row Most Recent Value   Heart Score Risk Calculator    History 0 Filed at: 12/01/2022 1500   ECG 1 Filed at: 12/01/2022 1500   Age 2 Filed at: 12/01/2022 1500   Risk Factors 1 Filed at: 12/01/2022 1500   Troponin 1 Filed at: 12/01/2022 1500   HEART Score 5 Filed at: 12/01/2022 1500                        SBIRT 22yo+    Flowsheet Row Most Recent Value   SBIRT (23 yo +)    In order to provide better care to our patients, we are screening all of our patients for alcohol and drug use  Would it be okay to ask you these screening questions? Yes Filed at: 12/01/2022 1317   Initial Alcohol Screen: US AUDIT-C     1  How often do you have a drink containing alcohol? 0 Filed at: 12/01/2022 1317   2  How many drinks containing alcohol do you have on a typical day you are drinking? 0 Filed at: 12/01/2022 1317   3a  Male UNDER 65: How often do you have five or more drinks on one occasion? 0 Filed at: 12/01/2022 1317   3b  FEMALE Any Age, or MALE 65+: How often do you have 4 or more drinks on one occassion?  0 Filed at: 12/01/2022 1317   Audit-C Score 0 Filed at: 12/01/2022 1317   JUAN: How many times in the past year have you    Used an illegal drug or used a prescription medication for non-medical reasons? Never Filed at: 12/01/2022 1317                    MDM  Number of Diagnoses or Management Options  Bilateral lower extremity edema  CHF exacerbation ([facility] [address] )  Diagnosis management comments: DDx: CHF, pneumonia, electrolyte abnormality   Will check cardiac workup with BNP  Patient has no leukocytosis, no anemia, no DANIEL, electrolyte abnormality  Patient has slightly elevated BNP  Patient has mild vascular congestion on his chest x-ray  No infiltrate seen  Patient's fatigue is in the presence of CHF exacerbation  Patient require admission  Patient's family reports how fatigued he is and concerned he fall  COVID flu RSV are negative  When discussed earlier from call with patient's cardiologist, patient's son reports that they were not given any explicit directions, and told patient may need medication adjustments  Pt to be diuresed and admitted for CHF exacerbation  Amount and/or Complexity of Data Reviewed  Clinical lab tests: ordered and reviewed  Tests in the radiology section of CPT®: ordered and reviewed  Obtain history from someone other than the patient: yes (Dr Gates)    Risk of Complications, Morbidity, and/or Mortality  General comments: Patient admitted for diuresis for CHF exacerbation and fatigue       Patient Progress  Patient progress: stable      Disposition  Final diagnoses:   Bilateral lower extremity edema   CHF exacerbation ([facility] [address] )     Time reflects when diagnosis was documented in both MDM as applicable and the Disposition within this note     Time User Action Codes Description Comment    12/1/2022  3:24 PM Braden Villalta Add [R60 0] Bilateral lower extremity edema     12/1/2022  3:24 PM Braden Villalta Add [I50 9] CHF exacerbation [facility])       ED Disposition     ED Disposition   Admit    Condition   Stable    Date/Time   Thu Dec 1, 2022  3:24 PM    Comment Case was discussed with Dr Dain López and the patient's admission status was agreed to be Admission Status: observation status to the service of Dr Dain Richard Broad up With Specialties Details Why Contact Aditya Hazel, DO  Follow up Please call and schedule a post hospital discharge follow-up visit to ideally occur within 7-10 days [address] [place] [address]            Discharge Medication List as of 12/2/2022  1:16 PM      START taking these medications    Details   losartan (COZAAR) 25 mg tablet Take 1 tablet (25 mg total) by mouth daily Do not start before December 3, 2022 , Starting Sat 12/3/2022, Until Mon 1/2/2023, Normal         CONTINUE these medications which have NOT CHANGED    Details   apixaban (ELIQUIS) 5 mg Take 1 tablet (5 mg total) by mouth 2 (two) times a day, Starting Wed 11/2/2022, Until Fri 12/2/2022, Normal      Cholecalciferol 50 MCG (2000 UT) CAPS Take 1 capsule by mouth 2 (two) times a day, Historical Med      digoxin (LANOXIN) 0 125 mg tablet Take 1 tablet (125 mcg total) by mouth daily, Starting Mon 11/14/2022, Until Wed 12/14/2022, Normal      furosemide (LASIX) 40 mg tablet Take 1 tablet (40 mg total) by mouth daily, Starting Mon 11/14/2022, Until Wed 12/14/2022, Normal      metoprolol succinate (TOPROL-XL) 100 mg 24 hr tablet Take 1 tablet (100 mg total) by mouth daily, Starting Mon 11/14/2022, Until Wed 12/14/2022, Normal      pantoprazole (PROTONIX) 40 mg tablet Take 40 mg by mouth daily, Historical Med      potassium chloride (Klor-Con) 10 mEq tablet Take 10 mEq by mouth 2 (two) times a day   Indications: Low Amount of Potassium in the Blood, Starting Tue 10/18/2022, Historical Med      sertraline (ZOLOFT) 50 mg tablet Take 100 mg by mouth daily, Starting Tue 7/5/2011, Historical Med      simvastatin (ZOCOR) 40 mg tablet Take 40 mg by mouth, Starting Tue 4/19/2011, Historical Med             Outpatient Discharge Orders   Discharge Diet     Activity as tolerated     No strenuous exercise     Call provider for:  persistent nausea or vomiting     Call provider for:  severe uncontrolled pain     Call provider for: active or persistent bleeding     Call provider for:  difficulty breathing, headache or visual disturbances     Call provider for:  persistent dizziness or light-headedness     Call provider for:  extreme fatigue       PDMP Review       Value Time User    PDMP Reviewed  Yes 12/1/2022  3:41 PM Vivian Jensen MD          ED Provider  Electronically Signed by           JACOB Hunt  12/05/22 2052

## 2022-12-02 VITALS
DIASTOLIC BLOOD PRESSURE: 68 MMHG | SYSTOLIC BLOOD PRESSURE: 124 MMHG | OXYGEN SATURATION: 95 % | WEIGHT: 236.77 LBS | RESPIRATION RATE: 18 BRPM | HEIGHT: 73 IN | TEMPERATURE: 98.3 F | BODY MASS INDEX: 31.38 KG/M2 | HEART RATE: 64 BPM

## 2022-12-02 PROBLEM — F32.A ANXIETY AND DEPRESSION: Status: ACTIVE | Noted: 2022-12-02

## 2022-12-02 PROBLEM — K21.9 GASTROESOPHAGEAL REFLUX DISEASE WITHOUT ESOPHAGITIS: Status: ACTIVE | Noted: 2022-01-01

## 2022-12-02 PROBLEM — F41.9 ANXIETY AND DEPRESSION: Status: ACTIVE | Noted: 2022-01-01

## 2022-12-02 LAB
ATRIAL RATE: 51 BPM
ATRIAL RATE: 82 BPM
QRS AXIS: -64 DEGREES
QRS AXIS: -64 DEGREES
QRSD INTERVAL: 160 MS
QRSD INTERVAL: 166 MS
QT INTERVAL: 470 MS
QT INTERVAL: 482 MS
QTC INTERVAL: 527 MS
QTC INTERVAL: 535 MS
T WAVE AXIS: 124 DEGREES
T WAVE AXIS: 126 DEGREES
VENTRICULAR RATE: 72 BPM
VENTRICULAR RATE: 78 BPM

## 2022-12-02 RX ORDER — LOSARTAN POTASSIUM 25 MG/1
25 TABLET ORAL DAILY
Qty: 30 TABLET | Refills: 0 | Status: SHIPPED | OUTPATIENT
Start: 2022-12-03 | End: 2023-01-02

## 2022-12-02 RX ORDER — POTASSIUM CHLORIDE 20 MEQ/1
40 TABLET, EXTENDED RELEASE ORAL ONCE
Status: COMPLETED | OUTPATIENT
Start: 2022-12-02 | End: 2022-12-02

## 2022-12-02 RX ORDER — FUROSEMIDE 40 MG/1
40 TABLET ORAL DAILY
Status: DISCONTINUED | OUTPATIENT
Start: 2022-12-02 | End: 2022-12-02 | Stop reason: HOSPADM

## 2022-12-02 RX ADMIN — POTASSIUM CHLORIDE 10 MEQ: 750 TABLET, EXTENDED RELEASE ORAL at 08:06

## 2022-12-02 RX ADMIN — SERTRALINE HYDROCHLORIDE 100 MG: 50 TABLET ORAL at 08:04

## 2022-12-02 RX ADMIN — POLYETHYLENE GLYCOL 3350 17 G: 17 POWDER, FOR SOLUTION ORAL at 08:01

## 2022-12-02 RX ADMIN — METOPROLOL SUCCINATE 100 MG: 50 TABLET, EXTENDED RELEASE ORAL at 08:03

## 2022-12-02 RX ADMIN — PANTOPRAZOLE SODIUM 40 MG: 40 TABLET, DELAYED RELEASE ORAL at 08:02

## 2022-12-02 RX ADMIN — DIGOXIN 125 MCG: 250 TABLET ORAL at 08:02

## 2022-12-02 RX ADMIN — FUROSEMIDE 40 MG: 40 TABLET ORAL at 08:04

## 2022-12-02 RX ADMIN — SPIRONOLACTONE 25 MG: 25 TABLET ORAL at 08:04

## 2022-12-02 RX ADMIN — POTASSIUM CHLORIDE 40 MEQ: 1500 TABLET, EXTENDED RELEASE ORAL at 08:05

## 2022-12-02 RX ADMIN — APIXABAN 5 MG: 5 TABLET, FILM COATED ORAL at 08:03

## 2022-12-02 RX ADMIN — DOCUSATE SODIUM 100 MG: 100 CAPSULE, LIQUID FILLED ORAL at 08:10

## 2022-12-02 NOTE — DISCHARGE SUMMARY
Luis Enrique 45  Discharge- [facility] 1935, 80 y o  male MRN: [id number]  Unit/Bed#: ED 12 Encounter: [id number]  Primary Care Provider: Bruce Amador DO   Date and time admitted to hospital: 12/1/2022 12:27 PM    * Generalized weakness  Assessment & Plan  · Multifactorial:-  · 1  Status post a recent COVID-19 booster shot  · 2  Advancing age with subsequent generalized deconditioning  · 3  Chronic comorbid medical conditions such as congestive heart failure  · See the outline below     Addendum at 1:20 p m -notified by PT and OT that the patient is okay for discharge with outpatient physical therapy  Patient remains medically stable  Okay for DC home  Patient's sons were bedside at time of my discharge rounding, all questions answered to their collective satisfaction    Ambulatory dysfunction  Assessment & Plan  · Secondary to the generalized weakness as outlined above  · Patient is otherwise medically stable for discharge  · Okay for DC home    Chronic respiratory failure with hypoxia ([address] )  Assessment & Plan  · Patient uses 3 L of supplemental oxygen daily at night  · Currently the patient is on room air  · Okay for discharge on pre admit supplemental oxygen requirements    Chronic systolic congestive heart failure ([address] )  Assessment & Plan  Wt Readings from Last 3 Encounters:   12/02/22 107 kg (236 lb 12 4 oz)   11/02/22 107 kg (235 lb 9 6 oz)   11/01/22 110 kg (242 lb 15 2 oz)   · Most recent echocardiogram in October of 2022-EF of 15-20%, severely reduced systolic dysfunction  · Elevated BNP noted, however the patient examines euvolemic  · Continue current cardiac based medications which include:  1  Apixaban  2  Toprol-XL  3  Cozaar  4  Lasix  5  Lipitor    Post discharge  · Hypokalemia:-repleted prior to discharge  · Outpatient follow-up with Cardiology as previously scheduled            Atrial fibrillation with RVR ([facility] [address] )  Assessment & Plan  · Rate controlled with digoxin, and Toprol-XL-continue the same post discharge  · Continued anticoagulation with apixaban post discharge    Gastroesophageal reflux disease without esophagitis  Assessment & Plan  · Continue Protonix post discharge    Anxiety and depression  Assessment & Plan  · Continue sertraline post discharge        Discharging Physician / Practitioner: Luis Angel White MD  PCP: Jennifer Pena DO  Admission Date:   Admission Orders (From admission, onward)     Ordered        12/01/22 1525  Place in Observation  Once                      Discharge Date: 12/02/22    Medical Problems     Resolved Problems  Date Reviewed: 11/2/2022   None         Consultations During Hospital Stay:  · None    Procedures Performed:   · None    Significant Findings / Test Results:   · Chest x-ray-mild cardiomegaly, mild vascular congestion, right pleural effusion    Incidental Findings:   · None     Test Results Pending at Discharge (will require follow up): · None     Outpatient Tests Requested:  · None    Complications:    • None    Reason for Admission:  Generalized weakness, ambulatory dysfunction    Hospital Course:     Veronica Pichardo is a 80 y o  male patient who originally presented to the hospital on 12/1/2022 due to generalized weakness  Please refer to the initial history and physical examination completed by Dr Rafat Thayer for the initial presenting features and complaints  In brief, the patient is an 66-year-old male that presented to the emergency room with generalized weakness, deconditioning, and ambulatory dysfunction  This was most likely secondary to recent COVID-19 booster vaccination  He was observed overnight  He was treated with IV Lasix while here in the hospital   He was seen by PT and OT, they ultimately felt that he was okay to go home with outpatient rehabilitation  Cozaar was started on this admission  He was otherwise discharged home on all of his other pre-admission medications, at the preadmission doses    At he will follow up in the near future in the outpatient setting with his PCP, and Cardiology as previously scheduled  Please refer to the assessment/plan portion of this discharge summary as outlined above for the remainder of the details in regard to his stay  Please see above list of diagnoses and related plan for additional information  Condition at Discharge: good     Discharge Day Visit / Exam:     Please refer to my progress note from earlier today for the final discharge day visit/exam details    Discussion with Family:  Patient's sons were brought up to par at time of discharge    Discharge instructions/Information to patient and family:   See after visit summary for information provided to patient and family  Provisions for Follow-Up Care:  See after visit summary for information related to follow-up care and any pertinent home health orders  Disposition:     Home      Planned Readmission:   • None     Discharge Statement:  I spent 40 minutes discharging the patient  This time was spent on the day of discharge  I had direct contact with the patient on the day of discharge  Greater than 50% of the total time was spent examining patient, answering all patient questions, arranging and discussing plan of care with patient as well as directly providing post-discharge instructions  Additional time then spent on discharge activities  Discharge Medications:  See after visit summary for reconciled discharge medications provided to patient and family        ** Please Note: This note has been constructed using a voice recognition system **

## 2022-12-02 NOTE — ASSESSMENT & PLAN NOTE
Wt Readings from Last 3 Encounters:   12/02/22 107 kg (236 lb 12 4 oz)   11/02/22 107 kg (235 lb 9 6 oz)   11/01/22 110 kg (242 lb 15 2 oz)   · Most recent echocardiogram in October of 2022-EF of 15-20%, severely reduced systolic dysfunction  · Elevated BNP noted, however the patient examines euvolemic  · Continue current cardiac based medications which include:  1  Apixaban  2  Toprol-XL  3  Cozaar  4  Lasix  5  Lipitor    6  Spironolactone  · Hypokalemia:-replete and recheck  · Outpatient follow-up with Cardiology as previously scheduled

## 2022-12-02 NOTE — DISCHARGE INSTR - AVS FIRST PAGE
Dear Inna Du,     It was our pleasure to care for you here at [facility]  It is our hope that we were always able to exceed the expected standards for your care during your stay  You were hospitalized due to generalized weakness  You were cared for on the medical/surgical floor by Bob Rock MD with the [facility] who covers for your primary care physician (PCP), Tramaine Collado DO, while you were hospitalized  If you have any questions or concerns related to this hospitalization, you may contact us at [phone]  For follow up as well as any medication refills, we recommend that you follow up with your primary care physician  A registered nurse will reach out to you by phone within a few days after your discharge to answer any additional questions that you may have after going home  However, at this time we provide for you here, the most important instructions / recommendations at discharge:     Notable Medication Adjustments -   New prescription Cozaar 25 mg-take 1 tablet daily  Okay to resume all other pre-admission medications at the preadmission doses  Testing Required after Discharge -   To be further determined in the outpatient setting by your primary care provider  Important follow up information -   Please follow-up with your PCP within 7-10 days  Other Instructions -   Please maintain a healthy low-sodium diet  Please review this entire after visit summary as additional general instructions including medication list, appointments, activity, diet, any pertinent wound care, and other additional recommendations from your care team that may be provided for you        Sincerely,     Bob Rock MD

## 2022-12-02 NOTE — ASSESSMENT & PLAN NOTE
· Secondary to the generalized weakness as outlined above  · Patient is otherwise medically stable for discharge  · Okay for DC home

## 2022-12-02 NOTE — ED NOTES
Family at bedside       [facility], [address] [place] [address]  12/02/22 0847

## 2022-12-02 NOTE — ASSESSMENT & PLAN NOTE
Wt Readings from Last 3 Encounters:   12/02/22 107 kg (236 lb 12 4 oz)   11/02/22 107 kg (235 lb 9 6 oz)   11/01/22 110 kg (242 lb 15 2 oz)   · Most recent echocardiogram in October of 2022-EF of 15-20%, severely reduced systolic dysfunction  · Elevated BNP noted, however the patient examines euvolemic  · Continue current cardiac based medications which include:  1  Apixaban  2  Toprol-XL  3  Cozaar  4  Lasix  5  Lipitor    Post discharge  · Hypokalemia:-repleted prior to discharge  · Outpatient follow-up with Cardiology as previously scheduled

## 2022-12-02 NOTE — ASSESSMENT & PLAN NOTE
· Multifactorial:-  · 1  Status post a recent COVID-19 booster shot  · 2  Advancing age with subsequent generalized deconditioning  · 3   Chronic comorbid medical conditions such as congestive heart failure  · See the outline below     Addendum at 1:20 p m -notified by PT and OT that the patient is okay for discharge with outpatient physical therapy  Patient remains medically stable  Okay for DC home  Patient's sons were bedside at time of my discharge rounding, all questions answered to their collective satisfaction

## 2022-12-02 NOTE — ASSESSMENT & PLAN NOTE
· Patient uses 3 L of supplemental oxygen daily at night  · Currently the patient is on room air  · Okay for discharge on pre admit supplemental oxygen requirements

## 2022-12-02 NOTE — ASSESSMENT & PLAN NOTE
· Multifactorial:-  · 1  Status post a recent COVID-19 booster shot  · 2  Advancing age with subsequent generalized deconditioning  · 3   Chronic comorbid medical conditions such as congestive heart failure  · See the outline below

## 2022-12-02 NOTE — PHYSICAL THERAPY NOTE
PHYSICAL THERAPY EVALUATION  NAME:  Kaur Dale  DATE: 12/02/22    AGE:   80 y o  Mrn:   [id number]  ADMIT DX:  Fatigue [R53 83]  Foot swelling [M79 89]  Problem List:   Patient Active Problem List   Diagnosis    Atrial fibrillation with RVR (HCC)    Chronic systolic congestive heart failure (HCC)    CAD (coronary artery disease), native coronary artery    History of CVA (cerebrovascular accident)    Benign essential HTN    Hallucinations    Chronic respiratory failure with hypoxia (HCC)    DANIEL (acute kidney injury) ([facility] )    Bacteremia    Goals of care, counseling/discussion    Chronic kidney disease, stage 3a ([facility] )    Ambulatory dysfunction    Generalized weakness    Gastroesophageal reflux disease without esophagitis    Anxiety and depression       Past Medical History  Past Medical History:   Diagnosis Date    Atrial fibrillation (HCC)     CHF (congestive heart failure) ([place] [address] )     Hypertension        Past Surgical History  History reviewed  No pertinent surgical history  Length Of Stay: 0  Performed at least 2 patient identifiers during session: Name and Birthday       12/02/22 0910   PT Last Visit   PT Visit Date 12/02/22   Note Type   Note type Evaluation   Pain Assessment   Pain Assessment Tool 0-10   Pain Score No Pain   Restrictions/Precautions   Weight Bearing Precautions Per Order No   Other Precautions Cognitive;Multiple lines; Fall Risk;Hard of hearing   Home Living   Type of [address] One level;Performs ADLs on one level; Able to live on main level with bedroom/bathroom;Stairs to enter with rails  (1 GEENA)   Bathroom Shower/Tub Walk-in shower   Bathroom Toilet Standard   Bathroom Equipment Grab bars in [place] [address];Walker  ([facility] used at baseline)   Prior Function   Level of [place] Independent with ADLs; Independent with functional mobility   Lives With Son  (2 sons)   Kenisha Armstrong Help From Family   IADLs Family/Friend/Other provides transportation; Independent with meal prep; Independent with medication management   Falls in the last 6 months 0   Vocational Retired   General   Family/Caregiver Present No   Cognition   Overall Cognitive Status WFL   Arousal/Participation Alert   Orientation Level Oriented to person;Oriented to place;Oriented to situation;Disoriented to time   Memory Within functional limits   Following Commands Follows one step commands without difficulty   RLE Assessment   RLE Assessment WFL   LLE Assessment   LLE Assessment WFL   Vision-Basic Assessment   Current Vision Does not wear glasses   Coordination   Movements are Fluid and Coordinated 1   Sensation WFL   Transfers   Sit to Stand 6  Modified independent   Additional items HOB elevated; Increased time required   Additional Comments pt denied dizziness with transitional movement   Ambulation/Elevation   Gait pattern Improper Weight shift;Decreased foot clearance  (generalized unsteadiness)   Gait Assistance 5  Supervision   Additional items Verbal cues   Assistive Device Straight cane  (improper cane placement)   Distance 80 ft   Ambulation/Elevation Additional Comments discussed using RW at home s/p d/c to improve stability   Balance   Static Sitting Good   Dynamic Sitting Fair +   Static Standing Fair   Dynamic Standing Fair -   Ambulatory Fair -   Endurance Deficit   Endurance Deficit Yes   Endurance Deficit Description slight fatigue noted   Activity Tolerance   Activity Tolerance Patient limited by fatigue   Assessment   Prognosis Good   Assessment Pt is 80 y o  male seen for PT evaluation s/p admit [facility] 12/1/2022 w/ Generalized weakness  PT consulted to assess pt's functional mobility and d/c needs  Order placed for PT eval and tx, w/ ambulate patient order  Pt agreeable to PT  session upon arrival, pt found supine on stretcher    PTA, pt was independent w/ all functional mobility w/ cane, ambulates household distances, has 1 GEENA, lives w/ sons in 3 level home and retired  Pt to benefit from continued PT tx to address deficits and maximize level of functional independent mobility and consistency  From PT/mobility standpoint, recommendation at time of d/c would be home with outpatient rehabilitation pending progress in order to facilitate return to PLOF  Upon conclusion pt  seated in recliner  Complexity: Comorbidities affecting pt's physical performance at time of assessment include: CHF, htn, a fib and respiratory failure  Personal factors affecting pt at time of IE include: ambulating w/ assistive device, stairs to enter home and hearing impairments  Please find objective findings from PT assessment regarding body systems outlined above with impairments and limitations including impaired balance, decreased endurance, gait deviations, decreased safety awareness and fall risk  Pt's clinical presentation is currently unstable/unpredictable seen in pt's presentation of abnormal potassium levels, abnormal Co2 levels and abnormal potassium levels  The patient's AM-PAC Basic Mobility Inpatient Short Form Raw Score is 23  A Raw score of greater than 16 suggests the patient may benefit from discharge to home  Please also refer to the recommendation of the Physical Therapist for safe discharge planning  Pt seen as a co-eval with OT due to the patient's co-morbidities, clinically unstable presentation, and present impairments which are a regression from the patient's baseline  Barriers to Discharge Inaccessible home environment   Goals   Patient Goals to go home   LTG Expiration Date 12/12/22   Long Term Goal #1 Pt will:  Perform transfers to modified  to improve ease of transfers  Perform ambulation with MI and RW vs cane for 250 feet to  increase Indep in home environment  Increase dynamic standing balance to F+ to decrease fall risk  Increase OOB activity tolerance to 10 minutes without s/s of exertion to decrease fall risk    Navigate up and down 1 step with MI so patient can enter and exit home  Plan   Treatment/Interventions Functional transfer training;Gait training;Equipment eval/education; Therapeutic exercise   PT Frequency 3-5x/wk   Recommendation   PT Discharge Recommendation Home with outpatient rehabilitation   Equipment Recommended   (pt has a RW at home)   [place] 8 in Bed Without Bedrails 4   Lying on Back to Sitting on Edge of Flat Bed 4   Moving Bed to Chair 4   Standing Up From Chair 4   Walk in Room 4   Climb 3-5 Stairs 3   Basic Mobility Inpatient Raw Score 23   Basic Mobility Standardized Score 50 88   Highest Level Of Mobility   JH-HLM Goal 7: Walk 25 feet or more   JH-HLM Achieved 7: Walk 25 feet or more       Time In: 0857  Time Out: 0910  Total Evaluation Minutes: Aakash Bradford PT

## 2022-12-02 NOTE — PLAN OF CARE
Problem: Potential for Falls  Goal: Patient will remain free of falls  Description: INTERVENTIONS:  - Educate patient/family on patient safety including physical limitations  - Instruct patient to call for assistance with activity   - Consult OT/PT to assist with strengthening/mobility   - Keep Call bell within reach  - Keep bed low and locked with side rails adjusted as appropriate  - Keep care items and personal belongings within reach  - Initiate and maintain comfort rounds  - Make Fall Risk Sign visible to staff  - Apply yellow socks and bracelet for high fall risk patients  - Consider moving patient to room near nurses station  Outcome: Progressing       Problem: PAIN - ADULT  Goal: Verbalizes/displays adequate comfort level or baseline comfort level  Description: Interventions:  - Encourage patient to monitor pain and request assistance  - Assess pain using appropriate pain scale  - Administer analgesics based on type and severity of pain and evaluate response  - Implement non-pharmacological measures as appropriate and evaluate response  - Consider cultural and social influences on pain and pain management  - Notify physician/advanced practitioner if interventions unsuccessful or patient reports new pain  Outcome: Progressing       Problem: INFECTION - ADULT  Goal: Absence or prevention of progression during hospitalization  Description: INTERVENTIONS:  - Assess and monitor for signs and symptoms of infection  - Monitor lab/diagnostic results  - Monitor all insertion sites, i e  indwelling lines, tubes, and drains  - Monitor endotracheal if appropriate and nasal secretions for changes in amount and color  - [place] appropriate cooling/warming therapies per order  - Administer medications as ordered  - Instruct and encourage patient and family to use good hand hygiene technique  - Identify and instruct in appropriate isolation precautions for identified infection/condition  Outcome: Progressing Problem: SAFETY ADULT  Goal: Maintain or return to baseline ADL function  Description: INTERVENTIONS:  -  Assess patient's ability to carry out ADLs; assess patient's baseline for ADL function and identify physical deficits which impact ability to perform ADLs (bathing, care of mouth/teeth, toileting, grooming, dressing, etc )  - Assess/evaluate cause of self-care deficits   - Assess range of motion  - Assess patient's mobility; develop plan if impaired  - Assess patient's need for assistive devices and provide as appropriate  - Encourage maximum independence but intervene and supervise when necessary  - Involve family in performance of ADLs  - Assess for home care needs following discharge   - Consider OT consult to assist with ADL evaluation and planning for discharge  - Provide patient education as appropriate  Outcome: Progressing

## 2022-12-02 NOTE — PROGRESS NOTES
[facility]  Progress Note Virginia Davidson 1935, 80 y o  male MRN: [id number]  Unit/Bed#: ED 12 Encounter: [id number]  Primary Care Provider: Buddy Khan DO   Date and time admitted to hospital: 12/1/2022 12:27 PM    * Generalized weakness  Assessment & Plan  · Multifactorial:-  · 1  Status post a recent COVID-19 booster shot  · 2  Advancing age with subsequent generalized deconditioning  · 3  Chronic comorbid medical conditions such as congestive heart failure  · See the outline below     Ambulatory dysfunction  Assessment & Plan  · Secondary to the generalized weakness as outlined above  · Await PT and OT evaluation  · Patient is otherwise medically stable for discharge  · Discharge planning based on PT and OT evaluation and subsequent recommendations    [facility] )  Assessment & Plan  · Patient uses 3 L of supplemental oxygen daily at night  · Currently the patient is on room air  · Continue close monitoring  · Continue oxygen as needed    Chronic systolic congestive heart failure (HCC)  Assessment & Plan  Wt Readings from Last 3 Encounters:   12/02/22 107 kg (236 lb 12 4 oz)   11/02/22 107 kg (235 lb 9 6 oz)   11/01/22 110 kg (242 lb 15 2 oz)   · Most recent echocardiogram in October of 2022-EF of 15-20%, severely reduced systolic dysfunction  · Elevated BNP noted, however the patient examines euvolemic  · Continue current cardiac based medications which include:  1  Apixaban  2  Toprol-XL  3  Cozaar  4  Lasix  5  Lipitor    6  Spironolactone  · Hypokalemia:-replete and recheck  · Outpatient follow-up with Cardiology as previously scheduled            Atrial fibrillation with RVR ([facility] )  Assessment & Plan  · Rate controlled with digoxin, and Toprol-XL  · Continued anticoagulation with apixaban    Gastroesophageal reflux disease without esophagitis  Assessment & Plan  · Continue Protonix    Anxiety and depression  Assessment & Plan  · Continue sertraline        VTE Prophylaxis:  Apixaban (Eliquis)    Patient Centered Rounds: I have performed bedside rounds with nursing staff today  Discussions with Specialists or Other Care Team Provider:  Case management, nursing, PT and OT  Education and Discussions with Family / Patient:  Patient's son Devin Hamman was brought up to par    Current Length of Stay: 0 day(s)    Current Patient Status: Observation   Certification Statement: The patient will continue to require additional inpatient hospital stay due to The need for PT and OT evaluation    Discharge Plan:  Hopeful discharge planning for later today post PT and OT evaluation    Code Status: Level 3 - DNAR and DNI    Subjective:   Patient seen and examined, sitting on the side of his bed, no new complaints, no distress, feels a lot better than prior to coming into the hospital    Objective:     Vitals:   Temp (24hrs), Av 8 °F (36 6 °C), Min:97 5 °F (36 4 °C), Max:98 3 °F (36 8 °C)    Temp:  [97 5 °F (36 4 °C)-98 3 °F (36 8 °C)] 98 3 °F (36 8 °C)  HR:  [66-96] 96  Resp:  [18] 18  BP: (107-137)/(58-89) 129/79  SpO2:  [93 %-95 %] 94 %  Body mass index is 31 24 kg/m²  Input and Output Summary (last 24 hours): Intake/Output Summary (Last 24 hours) at 2022 0814  Last data filed at 2022 0601  Gross per 24 hour   Intake --   Output 1350 ml   Net -1350 ml       Physical Exam:   Physical Exam  Vitals and nursing note reviewed  Constitutional:       General: He is not in acute distress  Appearance: Normal appearance  He is not ill-appearing  HENT:      Head: Normocephalic and atraumatic  Nose: Nose normal    Eyes:      Extraocular Movements: Extraocular movements intact  Pupils: Pupils are equal, round, and reactive to light  Cardiovascular:      Rate and Rhythm: Normal rate and regular rhythm  Pulses: Normal pulses  Heart sounds: Normal heart sounds  No murmur heard  No friction rub  No gallop     Pulmonary: Effort: Pulmonary effort is normal       Breath sounds: Normal breath sounds  Abdominal:      General: There is no distension  Palpations: Abdomen is soft  There is no mass  Tenderness: There is no abdominal tenderness  There is no guarding or rebound  Musculoskeletal:         General: No swelling or tenderness  Normal range of motion  Cervical back: Normal range of motion and neck supple  No rigidity  No muscular tenderness  Right lower leg: No edema  Left lower leg: No edema  Skin:     General: Skin is warm  Capillary Refill: Capillary refill takes less than 2 seconds  Findings: No erythema or rash  Neurological:      General: No focal deficit present  Mental Status: He is alert and oriented to person, place, and time  Mental status is at baseline  Psychiatric:         Mood and Affect: Mood normal          Behavior: Behavior normal          Additional Data:     Labs:    Results from last 7 days   Lab Units 12/01/22  1315   WBC Thousand/uL 7 53   HEMOGLOBIN g/dL 16 2   HEMATOCRIT % 52 2*   PLATELETS Thousands/uL 226   NEUTROS PCT % 69   LYMPHS PCT % 20   MONOS PCT % 8   EOS PCT % 3     Results from last 7 days   Lab Units 12/01/22  1315   SODIUM mmol/L 142   POTASSIUM mmol/L 3 3*   CHLORIDE mmol/L 98   CO2 mmol/L 39*   BUN mg/dL 16   CREATININE mg/dL 1 07   CALCIUM mg/dL 9 2   ALK PHOS U/L 65   ALT U/L 22   AST U/L 28                   * I Have Reviewed All Lab Data Listed Above  * Additional Pertinent Lab Tests Reviewed:  Amarilis Fuentes Admission  Reviewed    Imaging:  Imaging Reports Reviewed Today Include:  None    Recent Cultures (last 7 days):           Last 24 Hours Medication List:   Current Facility-Administered Medications   Medication Dose Route Frequency Provider Last Rate   • acetaminophen  650 mg Oral Q6H PRN Gerson Villagran MD     • apixaban  5 mg Oral BID Gerson Villagran MD     • atorvastatin  40 mg Oral Daily With Hello Agent Z MD Yajaira     • digoxin  125 mcg Oral Daily Carlito Michaels MD     • docusate sodium  100 mg Oral BID Carlito Michaels MD     • furosemide  40 mg Oral Daily Joe Espinoza MD     • losartan  25 mg Oral Daily Carlito Michaels MD     • metoprolol succinate  100 mg Oral Daily Carlito Michaels MD     • ondansetron  4 mg Intravenous Q6H PRN Carlito Michaels MD     • pantoprazole  40 mg Oral Daily Carlito Michaels MD     • polyethylene glycol  17 g Oral Daily Carlito Michaels MD     • potassium chloride  10 mEq Oral BID Carlito Michaels MD     • sertraline  100 mg Oral Daily Carlito Michaels MD     • sodium chloride (PF)  3 mL Intravenous Q1H PRN Carlito Michaels MD     • spironolactone  25 mg Oral Daily Carlito Michaels MD          Today, Patient Was Seen By: Joe Espinoza MD    ** Please Note: Dictation voice to text software may have been used in the creation of this document   **

## 2022-12-02 NOTE — ASSESSMENT & PLAN NOTE
· Secondary to the generalized weakness as outlined above  · Await PT and OT evaluation  · Patient is otherwise medically stable for discharge  · Discharge planning based on PT and OT evaluation and subsequent recommendations

## 2022-12-02 NOTE — OCCUPATIONAL THERAPY NOTE
Occupational Therapy Evaluation      Christie Delgadillo    12/2/2022    Principal Problem:    Generalized weakness  Active Problems:    Atrial fibrillation with RVR (HCC)    Chronic systolic congestive heart failure (HCC)    Chronic respiratory failure with hypoxia (HCC)    Ambulatory dysfunction    Gastroesophageal reflux disease without esophagitis    Anxiety and depression      Past Medical History:   Diagnosis Date    Atrial fibrillation (HCC)     CHF (congestive heart failure) ([address] )     Hypertension        History reviewed  No pertinent surgical history  12/02/22 0856   OT Last Visit   OT Visit Date 12/02/22   Note Type   Note type Evaluation   Pain Assessment   Pain Assessment Tool 0-10   Pain Score No Pain   Restrictions/Precautions   Weight Bearing Precautions Per Order No   Other Precautions Cognitive;Multiple lines; Fall Risk;Hard of hearing   Home Living   Type of [address] One level;Performs ADLs on one level; Able to live on main level with bedroom/bathroom;Stairs to enter with rails  ([address])   Bathroom Shower/Tub Walk-in shower   Bathroom Toilet Standard   Bathroom Equipment Grab bars in [place] [address];Walker  ([facility] at baseline)   Prior Function   Level of [place] Independent with ADLs; Independent with functional mobility   Lives With Son  (2 sons)   Marla Butt Help From Family   IADLs Family/Friend/Other provides transportation; Independent with meal prep; Independent with medication management   Falls in the last 6 months 0   Vocational Retired  (maintanance and )   ADL   [address]  [address]  [address]  Modified independent   [address] [facility] 5  Supervision/Setup   Transfers   Sit to Stand 6  Modified independent   Additional items Increased time required   Balance   Static Sitting Good   Dynamic Sitting Fair +   [address] Dynamic Standing Fair -   Ambulatory Fair -   Activity Tolerance   Activity Tolerance Patient limited by fatigue   RUE Assessment   RUE Assessment WFL   LUE Assessment   LUE Assessment WFL   Vision-Basic Assessment   Current Vision Does not wear glasses   Cognition   Overall Cognitive Status WFL   Arousal/Participation Alert; Cooperative   Attention Attends with cues to redirect   Orientation Level Oriented to person;Oriented to place;Oriented to situation;Disoriented to time  (Nov 2022)   Memory Within functional limits   Following Commands Follows one step commands without difficulty   Assessment   Limitation Decreased ADL status; Decreased Safe judgement during ADL;Decreased endurance;Decreased self-care trans;Decreased high-level ADLs   Prognosis Good   Assessment Pt is a 80 y o  male seen for OT evaluation s/p admit to [place] [address] on 12/1/2022 w/ Generalized weakness  Comorbidities affecting pt's functional performance at time of assessment include: A-fib, CHF, HTN, CAD, Hx of CVA, Anxiety  Personal factors affecting pt at time of IE include:steps to enter environment and difficulty performing ADLS  Prior to admission, pt was Mod I with ADLs  Upon evaluation: the following deficits impact occupational performance: decreased balance and decreased tolerance  Pt to benefit from continued skilled OT tx while in the hospital to address deficits as defined above and maximize level of functional independence w ADL's and functional mobility  Occupational Performance areas to address include: bathing/shower, toilet hygiene, dressing, functional mobility and clothing management  From OT standpoint, recommendation at time of d/c would be Out-patient OT  Goals   Patient Goals to go home   Plan   Treatment Interventions ADL retraining;Functional transfer training; Endurance training;Equipment evaluation/education; Compensatory technique education; Energy conservation; Activityengagement;Patient/family training   Goal Expiration Date 12/12/22   OT Treatment Day 0   OT Frequency 3-5x/wk   Recommendation   OT Discharge Recommendation Home with outpatient rehabilitation   Additional Comments  Pt seen as a co-eval with PT due to the patient's co-morbidities, clinically unstable presentation, and present impairments which are a regression from the patient's baseline  Additional Comments 2 The patient's raw score on the AM-PAC Daily Activity inpatient short form is 21, standardized score is 44 27, greater than 39 4  Patients at this level are likely to benefit from discharge to home  Please refer to the recommendation of the Occupational Therapist for safe discharge planning     AM-PAC Daily Activity Inpatient   Lower Body Dressing 3   Bathing 3   Toileting 3   Upper Body Dressing 4   Grooming 4   Eating 4   Daily Activity Raw Score 21   Daily Activity Standardized Score (Calc for Raw Score >=11) 44 27   AM-PAC Applied Cognition Inpatient   Following a Speech/Presentation 4   Understanding Ordinary Conversation 4   Taking Medications 4   Remembering Where Things Are Placed or Put Away 4   Remembering List of 4-5 Errands 4   Taking Care of Complicated Tasks 4   Applied Cognition Raw Score 24   Applied Cognition Standardized Score 62 21     GOALS:    Pt will achieve the following within specified time frame: STG  Pt will achieve the following goals within 5 days    *LB ADL with (I) using AE prn for inc'd independence with self cares    *Toileting with (I) for clothing management and hygiene for return to PLOF with personal care    *Increase static stand balance to F+ and dyn stand balance to F for inc'd safety with standing purposeful tasks    *Increase stand tolerance x3 m for inc'd tolerance with standing purposeful tasks    *Participate in 10m UE therex to increase overall stamina/activity tolerance for purposeful tasks    *Bed mobility- (I) for inc'd independence to manage own comfort and initiate EOB & OOB purposeful tasks    Pt will achieve the following within specified time frame: LTG  Pt will achieve the following goals within 10 days    *Increase static stand balance to G- and dyn stand balance to F+ for inc'd safety with standing purposeful tasks    *Increase stand tolerance x5 m for inc'd tolerance with standing purposeful tasks      Giacomo Bonds MS, OTR/L

## 2022-12-02 NOTE — ASSESSMENT & PLAN NOTE
· Patient uses 3 L of supplemental oxygen daily at night  · Currently the patient is on room air  · Continue close monitoring  · Continue oxygen as needed

## 2022-12-02 NOTE — PLAN OF CARE
Problem: OCCUPATIONAL THERAPY ADULT  Goal: Performs self-care activities at highest level of function for planned discharge setting  See evaluation for individualized goals  Description: Treatment Interventions: ADL retraining, Functional transfer training, Endurance training, Equipment evaluation/education, Compensatory technique education, Energy conservation, Activityengagement, Patient/family training    See flowsheet documentation for full assessment, interventions and recommendations  Note: Limitation: Decreased ADL status, Decreased Safe judgement during ADL, Decreased endurance, Decreased self-care trans, Decreased high-level ADLs  Prognosis: Good  Assessment: Pt is a 80 y o  male seen for OT evaluation s/p admit to [address] [place] [address] on 12/1/2022 w/ Generalized weakness  Comorbidities affecting pt's functional performance at time of assessment include: A-fib, CHF, HTN, CAD, Hx of CVA, Anxiety  Personal factors affecting pt at time of IE include:steps to enter environment and difficulty performing ADLS  Prior to admission, pt was Mod I with ADLs  Upon evaluation: the following deficits impact occupational performance: decreased balance and decreased tolerance  Pt to benefit from continued skilled OT tx while in the hospital to address deficits as defined above and maximize level of functional independence w ADL's and functional mobility  Occupational Performance areas to address include: bathing/shower, toilet hygiene, dressing, functional mobility and clothing management  From OT standpoint, recommendation at time of d/c would be Out-patient OT       OT Discharge Recommendation: Home with outpatient rehabilitation     Krish Daley MS, OTR/L

## 2022-12-02 NOTE — ASSESSMENT & PLAN NOTE
· Rate controlled with digoxin, and Toprol-XL-continue the same post discharge  · Continued anticoagulation with apixaban post discharge

## 2022-12-02 NOTE — ED NOTES
Pt/ot at bedside     Tommy Baker, [facility]  12/02/22 9606

## 2022-12-07 LAB — DIGITOXIN SERPL-MCNC: <5 NG/ML (ref 10–25)

## 2023-01-01 ENCOUNTER — HOME CARE VISIT (OUTPATIENT)
Dept: HOME HOSPICE | Facility: HOSPICE | Age: 88
End: 2023-01-01

## 2023-01-01 ENCOUNTER — HOME CARE VISIT (OUTPATIENT)
Dept: HOME HEALTH SERVICES | Facility: HOME HEALTHCARE | Age: 88
End: 2023-01-01

## 2023-01-01 ENCOUNTER — APPOINTMENT (INPATIENT)
Dept: INTERVENTIONAL RADIOLOGY/VASCULAR | Facility: HOSPITAL | Age: 88
End: 2023-01-01

## 2023-01-01 ENCOUNTER — APPOINTMENT (EMERGENCY)
Dept: CT IMAGING | Facility: HOSPITAL | Age: 88
End: 2023-01-01

## 2023-01-01 ENCOUNTER — APPOINTMENT (EMERGENCY)
Dept: RADIOLOGY | Facility: HOSPITAL | Age: 88
End: 2023-01-01

## 2023-01-01 ENCOUNTER — HOSPITAL ENCOUNTER (INPATIENT)
Facility: HOSPITAL | Age: 88
LOS: 3 days | Discharge: NURSING, REHAB OR LONG-TERM CARE | End: 2023-03-19
Attending: EMERGENCY MEDICINE | Admitting: STUDENT IN AN ORGANIZED HEALTH CARE EDUCATION/TRAINING PROGRAM

## 2023-01-01 ENCOUNTER — HOSPICE ADMISSION (OUTPATIENT)
Dept: HOME HOSPICE | Facility: HOSPICE | Age: 88
End: 2023-01-01

## 2023-01-01 VITALS
DIASTOLIC BLOOD PRESSURE: 58 MMHG | TEMPERATURE: 97.4 F | HEART RATE: 120 BPM | RESPIRATION RATE: 22 BRPM | SYSTOLIC BLOOD PRESSURE: 100 MMHG

## 2023-01-01 VITALS — RESPIRATION RATE: 16 BRPM | TEMPERATURE: 98.2 F

## 2023-01-01 VITALS
HEIGHT: 72 IN | HEART RATE: 90 BPM | TEMPERATURE: 97.7 F | WEIGHT: 249.34 LBS | RESPIRATION RATE: 16 BRPM | BODY MASS INDEX: 33.77 KG/M2 | DIASTOLIC BLOOD PRESSURE: 71 MMHG | SYSTOLIC BLOOD PRESSURE: 136 MMHG | OXYGEN SATURATION: 93 %

## 2023-01-01 VITALS — RESPIRATION RATE: 17 BRPM | TEMPERATURE: 98.9 F | HEART RATE: 96 BPM

## 2023-01-01 DIAGNOSIS — N17.9 AKI (ACUTE KIDNEY INJURY) (HCC): Primary | ICD-10-CM

## 2023-01-01 DIAGNOSIS — E16.2 HYPOGLYCEMIA: ICD-10-CM

## 2023-01-01 DIAGNOSIS — I26.99 PULMONARY EMBOLISM (HCC): ICD-10-CM

## 2023-01-01 DIAGNOSIS — Z71.89 GOALS OF CARE, COUNSELING/DISCUSSION: ICD-10-CM

## 2023-01-01 DIAGNOSIS — R41.82 ALTERED MENTAL STATUS: ICD-10-CM

## 2023-01-01 DIAGNOSIS — Z99.81 SUPPLEMENTAL OXYGEN DEPENDENT: ICD-10-CM

## 2023-01-01 DIAGNOSIS — I50.23 ACUTE ON CHRONIC SYSTOLIC CONGESTIVE HEART FAILURE (HCC): ICD-10-CM

## 2023-01-01 LAB
2HR DELTA HS TROPONIN: 1 NG/L
4HR DELTA HS TROPONIN: 1 NG/L
ALBUMIN SERPL BCP-MCNC: 2.7 G/DL (ref 3.5–5)
ALBUMIN SERPL BCP-MCNC: 2.8 G/DL (ref 3.5–5)
ALP SERPL-CCNC: 80 U/L (ref 34–104)
ALP SERPL-CCNC: 83 U/L (ref 34–104)
ALT SERPL W P-5'-P-CCNC: 37 U/L (ref 7–52)
ALT SERPL W P-5'-P-CCNC: 38 U/L (ref 7–52)
ANION GAP SERPL CALCULATED.3IONS-SCNC: 6 MMOL/L (ref 4–13)
ANION GAP SERPL CALCULATED.3IONS-SCNC: 9 MMOL/L (ref 4–13)
APPEARANCE FLD: CLEAR
APTT PPP: 38 SECONDS (ref 23–37)
APTT PPP: >210 SECONDS (ref 23–37)
ARTERIAL PATENCY WRIST A: YES
AST SERPL W P-5'-P-CCNC: 33 U/L (ref 13–39)
AST SERPL W P-5'-P-CCNC: 42 U/L (ref 13–39)
ATRIAL RATE: 57 BPM
B2 GLYCOPROT1 IGA SERPL IA-ACNC: 6.3
B2 GLYCOPROT1 IGG SERPL IA-ACNC: 2.2
B2 GLYCOPROT1 IGM SERPL IA-ACNC: <2.4
BACTERIA BLD CULT: NORMAL
BACTERIA BLD CULT: NORMAL
BACTERIA SPEC BFLD CULT: NO GROWTH
BACTERIA UR QL AUTO: ABNORMAL /HPF
BASE EXCESS BLDA CALC-SCNC: 3.3 MMOL/L
BASOPHILS # BLD AUTO: 0.02 THOUSANDS/ÂΜL (ref 0–0.1)
BASOPHILS NFR BLD AUTO: 0 % (ref 0–1)
BILIRUB SERPL-MCNC: 2.83 MG/DL (ref 0.2–1)
BILIRUB SERPL-MCNC: 2.84 MG/DL (ref 0.2–1)
BILIRUB UR QL STRIP: ABNORMAL
BNP SERPL-MCNC: 1415 PG/ML (ref 0–100)
BUN SERPL-MCNC: 30 MG/DL (ref 5–25)
BUN SERPL-MCNC: 30 MG/DL (ref 5–25)
CALCIUM ALBUM COR SERPL-MCNC: 10.2 MG/DL (ref 8.3–10.1)
CALCIUM ALBUM COR SERPL-MCNC: 10.2 MG/DL (ref 8.3–10.1)
CALCIUM SERPL-MCNC: 9.2 MG/DL (ref 8.4–10.2)
CALCIUM SERPL-MCNC: 9.2 MG/DL (ref 8.4–10.2)
CARDIAC TROPONIN I PNL SERPL HS: 21 NG/L
CARDIAC TROPONIN I PNL SERPL HS: 22 NG/L
CARDIAC TROPONIN I PNL SERPL HS: 22 NG/L
CARDIOLIPIN IGA SER IA-ACNC: 4.2
CARDIOLIPIN IGG SER IA-ACNC: 1.8
CARDIOLIPIN IGM SER IA-ACNC: <0.9
CHLORIDE SERPL-SCNC: 104 MMOL/L (ref 96–108)
CHLORIDE SERPL-SCNC: 104 MMOL/L (ref 96–108)
CLARITY UR: CLEAR
CO2 SERPL-SCNC: 31 MMOL/L (ref 21–32)
CO2 SERPL-SCNC: 33 MMOL/L (ref 21–32)
COLOR FLD: YELLOW
COLOR UR: ABNORMAL
CREAT SERPL-MCNC: 1.77 MG/DL (ref 0.6–1.3)
CREAT SERPL-MCNC: 1.82 MG/DL (ref 0.6–1.3)
EOSINOPHIL # BLD AUTO: 0.13 THOUSAND/ÂΜL (ref 0–0.61)
EOSINOPHIL NFR BLD AUTO: 2 % (ref 0–6)
ERYTHROCYTE [DISTWIDTH] IN BLOOD BY AUTOMATED COUNT: 17.5 % (ref 11.6–15.1)
ERYTHROCYTE [DISTWIDTH] IN BLOOD BY AUTOMATED COUNT: 17.7 % (ref 11.6–15.1)
FLUAV RNA RESP QL NAA+PROBE: NEGATIVE
FLUBV RNA RESP QL NAA+PROBE: NEGATIVE
FOLATE SERPL-MCNC: 5.8 NG/ML (ref 3.1–17.5)
GFR SERPL CREATININE-BSD FRML MDRD: 32 ML/MIN/1.73SQ M
GFR SERPL CREATININE-BSD FRML MDRD: 33 ML/MIN/1.73SQ M
GLUCOSE FLD-MCNC: 89 MG/DL
GLUCOSE SERPL-MCNC: 116 MG/DL (ref 65–140)
GLUCOSE SERPL-MCNC: 62 MG/DL (ref 65–140)
GLUCOSE SERPL-MCNC: 66 MG/DL (ref 65–140)
GLUCOSE SERPL-MCNC: 68 MG/DL (ref 65–140)
GLUCOSE SERPL-MCNC: 75 MG/DL (ref 65–140)
GLUCOSE UR STRIP-MCNC: NEGATIVE MG/DL
GRAM STN SPEC: NORMAL
GRAM STN SPEC: NORMAL
HCO3 BLDA-SCNC: 27.7 MMOL/L (ref 22–28)
HCT VFR BLD AUTO: 51.9 % (ref 36.5–49.3)
HCT VFR BLD AUTO: 52.2 % (ref 36.5–49.3)
HGB BLD-MCNC: 16.3 G/DL (ref 12–17)
HGB BLD-MCNC: 16.4 G/DL (ref 12–17)
HGB UR QL STRIP.AUTO: ABNORMAL
HYALINE CASTS #/AREA URNS LPF: ABNORMAL /LPF
IMM GRANULOCYTES # BLD AUTO: 0.02 THOUSAND/UL (ref 0–0.2)
IMM GRANULOCYTES NFR BLD AUTO: 0 % (ref 0–2)
INR PPP: 2.47 (ref 0.84–1.19)
KETONES UR STRIP-MCNC: NEGATIVE MG/DL
LACTATE SERPL-SCNC: 1.7 MMOL/L (ref 0.5–2)
LDH FLD L TO P-CCNC: 59 U/L
LEUKOCYTE ESTERASE UR QL STRIP: NEGATIVE
LYMPHOCYTES # BLD AUTO: 1.32 THOUSANDS/ÂΜL (ref 0.6–4.47)
LYMPHOCYTES NFR BLD AUTO: 21 % (ref 14–44)
LYMPHOCYTES NFR BLD AUTO: 89 %
MAGNESIUM SERPL-MCNC: 2.1 MG/DL (ref 1.9–2.7)
MCH RBC QN AUTO: 32.1 PG (ref 26.8–34.3)
MCH RBC QN AUTO: 32.4 PG (ref 26.8–34.3)
MCHC RBC AUTO-ENTMCNC: 31.4 G/DL (ref 31.4–37.4)
MCHC RBC AUTO-ENTMCNC: 31.4 G/DL (ref 31.4–37.4)
MCV RBC AUTO: 102 FL (ref 82–98)
MCV RBC AUTO: 103 FL (ref 82–98)
MONO+MESO NFR FLD MANUAL: 1 %
MONOCYTES # BLD AUTO: 0.68 THOUSAND/ÂΜL (ref 0.17–1.22)
MONOCYTES NFR BLD AUTO: 11 % (ref 4–12)
MONOCYTES NFR BLD AUTO: 8 %
MRSA NOSE QL CULT: NORMAL
MUCOUS THREADS UR QL AUTO: ABNORMAL
NEUTROPHILS # BLD AUTO: 4.26 THOUSANDS/ÂΜL (ref 1.85–7.62)
NEUTS SEG NFR BLD AUTO: 2 %
NEUTS SEG NFR BLD AUTO: 66 % (ref 43–75)
NITRITE UR QL STRIP: NEGATIVE
NON VENT TYPE- NRB: 100
NON-SQ EPI CELLS URNS QL MICRO: ABNORMAL /HPF
NRBC BLD AUTO-RTO: 0 /100 WBCS
O2 CT BLDA-SCNC: 22.5 ML/DL (ref 16–23)
OXYHGB MFR BLDA: 98.3 % (ref 94–97)
PCO2 BLDA: 41.4 MM HG (ref 36–44)
PH BLDA: 7.44 [PH] (ref 7.35–7.45)
PH BODY FLUID: 7.5
PH UR STRIP.AUTO: 5.5 [PH]
PLATELET # BLD AUTO: 172 THOUSANDS/UL (ref 149–390)
PLATELET # BLD AUTO: 180 THOUSANDS/UL (ref 149–390)
PMV BLD AUTO: 10.3 FL (ref 8.9–12.7)
PMV BLD AUTO: 10.7 FL (ref 8.9–12.7)
PO2 BLDA: 290.1 MM HG (ref 75–129)
POTASSIUM SERPL-SCNC: 4.2 MMOL/L (ref 3.5–5.3)
POTASSIUM SERPL-SCNC: 4.8 MMOL/L (ref 3.5–5.3)
PROCALCITONIN SERPL-MCNC: 0.05 NG/ML
PROT FLD-MCNC: <2 G/DL
PROT SERPL-MCNC: 5.9 G/DL (ref 6.4–8.4)
PROT SERPL-MCNC: 6 G/DL (ref 6.4–8.4)
PROT UR STRIP-MCNC: ABNORMAL MG/DL
PROTHROMBIN TIME: 26.6 SECONDS (ref 11.6–14.5)
QRS AXIS: -74 DEGREES
QRSD INTERVAL: 128 MS
QT INTERVAL: 410 MS
QTC INTERVAL: 520 MS
RBC # BLD AUTO: 5.06 MILLION/UL (ref 3.88–5.62)
RBC # BLD AUTO: 5.07 MILLION/UL (ref 3.88–5.62)
RBC #/AREA URNS AUTO: ABNORMAL /HPF
RSV RNA RESP QL NAA+PROBE: NEGATIVE
SARS-COV-2 RNA RESP QL NAA+PROBE: NEGATIVE
SITE: NORMAL
SODIUM SERPL-SCNC: 141 MMOL/L (ref 135–147)
SODIUM SERPL-SCNC: 146 MMOL/L (ref 135–147)
SP GR UR STRIP.AUTO: 1.02
SPECIMEN SOURCE: ABNORMAL
T WAVE AXIS: 114 DEGREES
TOTAL CELLS COUNTED SPEC: 100
UROBILINOGEN UR QL STRIP.AUTO: 1 E.U./DL
VENTRICULAR RATE: 97 BPM
VIT B12 SERPL-MCNC: 1528 PG/ML (ref 100–900)
WBC # BLD AUTO: 6.31 THOUSAND/UL (ref 4.31–10.16)
WBC # BLD AUTO: 6.43 THOUSAND/UL (ref 4.31–10.16)
WBC # FLD MANUAL: 186 /UL
WBC #/AREA URNS AUTO: ABNORMAL /HPF

## 2023-01-01 PROCEDURE — 0W993ZZ DRAINAGE OF RIGHT PLEURAL CAVITY, PERCUTANEOUS APPROACH: ICD-10-PCS | Performed by: RADIOLOGY

## 2023-01-01 RX ORDER — MIDODRINE HYDROCHLORIDE 5 MG/1
5 TABLET ORAL
Status: DISCONTINUED | OUTPATIENT
Start: 2023-01-01 | End: 2023-01-01

## 2023-01-01 RX ORDER — LORAZEPAM 2 MG/ML
1 INJECTION INTRAMUSCULAR
Status: DISCONTINUED | OUTPATIENT
Start: 2023-01-01 | End: 2023-01-01 | Stop reason: HOSPADM

## 2023-01-01 RX ORDER — LIDOCAINE HYDROCHLORIDE 10 MG/ML
INJECTION, SOLUTION EPIDURAL; INFILTRATION; INTRACAUDAL; PERINEURAL AS NEEDED
Status: COMPLETED | OUTPATIENT
Start: 2023-01-01 | End: 2023-01-01

## 2023-01-01 RX ORDER — DEXTROSE MONOHYDRATE 100 MG/ML
250 INJECTION, SOLUTION INTRAVENOUS CONTINUOUS
Status: DISPENSED | OUTPATIENT
Start: 2023-01-01 | End: 2023-01-01

## 2023-01-01 RX ORDER — HALOPERIDOL 5 MG/ML
0.5 INJECTION INTRAMUSCULAR EVERY 2 HOUR PRN
Qty: 1 ML | Refills: 0
Start: 2023-01-01 | End: 2023-01-01

## 2023-01-01 RX ORDER — ACETAMINOPHEN 325 MG/1
650 TABLET ORAL EVERY 4 HOURS PRN
Status: DISCONTINUED | OUTPATIENT
Start: 2023-01-01 | End: 2023-01-01

## 2023-01-01 RX ORDER — GLYCOPYRROLATE 0.2 MG/ML
0.1 INJECTION INTRAMUSCULAR; INTRAVENOUS EVERY 4 HOURS PRN
Qty: 1 ML | Refills: 0
Start: 2023-01-01 | End: 2023-01-01

## 2023-01-01 RX ORDER — ONDANSETRON 2 MG/ML
4 INJECTION INTRAMUSCULAR; INTRAVENOUS EVERY 6 HOURS PRN
Status: DISCONTINUED | OUTPATIENT
Start: 2023-01-01 | End: 2023-01-01

## 2023-01-01 RX ORDER — HEPARIN SODIUM 1000 [USP'U]/ML
7600 INJECTION, SOLUTION INTRAVENOUS; SUBCUTANEOUS ONCE
Status: COMPLETED | OUTPATIENT
Start: 2023-01-01 | End: 2023-01-01

## 2023-01-01 RX ORDER — POTASSIUM CHLORIDE 750 MG/1
10 TABLET, EXTENDED RELEASE ORAL 2 TIMES DAILY
Status: DISCONTINUED | OUTPATIENT
Start: 2023-01-01 | End: 2023-01-01

## 2023-01-01 RX ORDER — HALOPERIDOL 5 MG/ML
0.5 INJECTION INTRAMUSCULAR EVERY 2 HOUR PRN
Qty: 1 ML | Refills: 0
Start: 2023-01-01 | End: 2023-01-01 | Stop reason: SDUPTHER

## 2023-01-01 RX ORDER — HEPARIN SODIUM 1000 [USP'U]/ML
7600 INJECTION, SOLUTION INTRAVENOUS; SUBCUTANEOUS EVERY 6 HOURS PRN
Status: DISCONTINUED | OUTPATIENT
Start: 2023-01-01 | End: 2023-01-01

## 2023-01-01 RX ORDER — SERTRALINE HYDROCHLORIDE 100 MG/1
100 TABLET, FILM COATED ORAL DAILY
Status: DISCONTINUED | OUTPATIENT
Start: 2023-01-01 | End: 2023-01-01

## 2023-01-01 RX ORDER — FUROSEMIDE 10 MG/ML
40 INJECTION INTRAMUSCULAR; INTRAVENOUS 2 TIMES DAILY
Status: DISCONTINUED | OUTPATIENT
Start: 2023-01-01 | End: 2023-01-01

## 2023-01-01 RX ORDER — HYDROMORPHONE HCL/PF 1 MG/ML
0.5 SYRINGE (ML) INJECTION ONCE
Status: COMPLETED | OUTPATIENT
Start: 2023-01-01 | End: 2023-01-01

## 2023-01-01 RX ORDER — PANTOPRAZOLE SODIUM 40 MG/1
40 TABLET, DELAYED RELEASE ORAL
Status: DISCONTINUED | OUTPATIENT
Start: 2023-01-01 | End: 2023-01-01

## 2023-01-01 RX ORDER — HEPARIN SODIUM 1000 [USP'U]/ML
3800 INJECTION, SOLUTION INTRAVENOUS; SUBCUTANEOUS EVERY 6 HOURS PRN
Status: DISCONTINUED | OUTPATIENT
Start: 2023-01-01 | End: 2023-01-01

## 2023-01-01 RX ORDER — PRAVASTATIN SODIUM 40 MG
80 TABLET ORAL
Status: DISCONTINUED | OUTPATIENT
Start: 2023-01-01 | End: 2023-01-01

## 2023-01-01 RX ORDER — DEXTROSE MONOHYDRATE 100 MG/ML
250 INJECTION, SOLUTION INTRAVENOUS CONTINUOUS
Status: DISCONTINUED | OUTPATIENT
Start: 2023-01-01 | End: 2023-01-01

## 2023-01-01 RX ORDER — FUROSEMIDE 10 MG/ML
10 SYRINGE (ML) INJECTION CONTINUOUS
Status: DISCONTINUED | OUTPATIENT
Start: 2023-01-01 | End: 2023-01-01

## 2023-01-01 RX ORDER — MIDODRINE HYDROCHLORIDE 5 MG/1
2.5 TABLET ORAL
Status: DISCONTINUED | OUTPATIENT
Start: 2023-01-01 | End: 2023-01-01

## 2023-01-01 RX ORDER — FUROSEMIDE 10 MG/ML
40 INJECTION INTRAMUSCULAR; INTRAVENOUS ONCE
Status: DISCONTINUED | OUTPATIENT
Start: 2023-01-01 | End: 2023-01-01

## 2023-01-01 RX ORDER — DEXTROSE MONOHYDRATE 50 MG/ML
42 INJECTION, SOLUTION INTRAVENOUS CONTINUOUS
Status: DISCONTINUED | OUTPATIENT
Start: 2023-01-01 | End: 2023-01-01

## 2023-01-01 RX ORDER — GLYCOPYRROLATE 0.2 MG/ML
0.1 INJECTION INTRAMUSCULAR; INTRAVENOUS EVERY 4 HOURS PRN
Status: DISCONTINUED | OUTPATIENT
Start: 2023-01-01 | End: 2023-01-01 | Stop reason: HOSPADM

## 2023-01-01 RX ORDER — HEPARIN SODIUM 10000 [USP'U]/100ML
3-30 INJECTION, SOLUTION INTRAVENOUS
Status: DISCONTINUED | OUTPATIENT
Start: 2023-01-01 | End: 2023-01-01

## 2023-01-01 RX ORDER — HALOPERIDOL 5 MG/ML
0.5 INJECTION INTRAMUSCULAR EVERY 2 HOUR PRN
Status: DISCONTINUED | OUTPATIENT
Start: 2023-01-01 | End: 2023-01-01 | Stop reason: HOSPADM

## 2023-01-01 RX ADMIN — MORPHINE SULFATE 2 MG: 2 INJECTION, SOLUTION INTRAMUSCULAR; INTRAVENOUS at 01:52

## 2023-01-01 RX ADMIN — POTASSIUM CHLORIDE 10 MEQ: 750 TABLET, EXTENDED RELEASE ORAL at 08:42

## 2023-01-01 RX ADMIN — DEXTROSE MONOHYDRATE 250 ML: 100 INJECTION, SOLUTION INTRAVENOUS at 09:52

## 2023-01-01 RX ADMIN — Medication 10 MG/HR: at 21:06

## 2023-01-01 RX ADMIN — LIDOCAINE HYDROCHLORIDE 10 ML: 10 INJECTION, SOLUTION EPIDURAL; INFILTRATION; INTRACAUDAL; PERINEURAL at 16:21

## 2023-01-01 RX ADMIN — HEPARIN SODIUM 15 UNITS/KG/HR: 10000 INJECTION, SOLUTION INTRAVENOUS at 05:43

## 2023-01-01 RX ADMIN — HEPARIN SODIUM 18 UNITS/KG/HR: 10000 INJECTION, SOLUTION INTRAVENOUS at 12:21

## 2023-01-01 RX ADMIN — IOHEXOL 100 ML: 350 INJECTION, SOLUTION INTRAVENOUS at 10:20

## 2023-01-01 RX ADMIN — DEXTROSE MONOHYDRATE 250 ML: 100 INJECTION, SOLUTION INTRAVENOUS at 09:15

## 2023-01-01 RX ADMIN — MIDODRINE HYDROCHLORIDE 5 MG: 5 TABLET ORAL at 08:42

## 2023-01-01 RX ADMIN — MORPHINE SULFATE 2 MG: 2 INJECTION, SOLUTION INTRAMUSCULAR; INTRAVENOUS at 22:42

## 2023-01-01 RX ADMIN — MORPHINE SULFATE 2 MG: 2 INJECTION, SOLUTION INTRAMUSCULAR; INTRAVENOUS at 09:46

## 2023-01-01 RX ADMIN — MORPHINE SULFATE 2 MG: 2 INJECTION, SOLUTION INTRAMUSCULAR; INTRAVENOUS at 10:23

## 2023-01-01 RX ADMIN — MORPHINE SULFATE 2 MG: 2 INJECTION, SOLUTION INTRAMUSCULAR; INTRAVENOUS at 05:59

## 2023-01-01 RX ADMIN — SERTRALINE HYDROCHLORIDE 100 MG: 50 TABLET ORAL at 16:55

## 2023-01-01 RX ADMIN — SERTRALINE HYDROCHLORIDE 100 MG: 50 TABLET ORAL at 08:42

## 2023-01-01 RX ADMIN — HEPARIN SODIUM 7600 UNITS: 1000 INJECTION INTRAVENOUS; SUBCUTANEOUS at 12:14

## 2023-01-01 RX ADMIN — PRAVASTATIN SODIUM 80 MG: 20 TABLET ORAL at 16:55

## 2023-01-01 RX ADMIN — HYDROMORPHONE HYDROCHLORIDE 0.5 MG: 1 INJECTION, SOLUTION INTRAMUSCULAR; INTRAVENOUS; SUBCUTANEOUS at 04:39

## 2023-01-01 RX ADMIN — DEXTROSE 42 ML/HR: 5 SOLUTION INTRAVENOUS at 06:41

## 2023-01-04 DIAGNOSIS — I50.9 CHF EXACERBATION (HCC): ICD-10-CM

## 2023-01-04 RX ORDER — LOSARTAN POTASSIUM 25 MG/1
25 TABLET ORAL DAILY
Qty: 90 TABLET | Refills: 3 | Status: SHIPPED | OUTPATIENT
Start: 2023-01-04

## 2023-01-17 ENCOUNTER — TELEPHONE (OUTPATIENT)
Dept: CARDIOLOGY CLINIC | Facility: CLINIC | Age: 88
End: 2023-01-17

## 2023-01-17 DIAGNOSIS — I50.20 HFREF (HEART FAILURE WITH REDUCED EJECTION FRACTION) (HCC): Primary | ICD-10-CM

## 2023-01-17 NOTE — TELEPHONE ENCOUNTER
I was able to return patient's son Joe's phone call about his father  As patient blood pressure has been soft at home and he was more somnolent on losartan 25 mg daily we will discontinue this medical therapy at this time  I will check BMP and digoxin level to continue to monitor patient  Patient's son notes that he is doing much better since stopping losartan therapy therefore we will continue off this medication  Follow-up laboratory studies and repeat transthoracic echocardiogram and I will see patient as scheduled or sooner if necessary

## 2023-02-17 ENCOUNTER — TELEPHONE (OUTPATIENT)
Dept: CARDIOLOGY CLINIC | Facility: CLINIC | Age: 88
End: 2023-02-17

## 2023-02-17 ENCOUNTER — APPOINTMENT (EMERGENCY)
Dept: RADIOLOGY | Facility: HOSPITAL | Age: 88
End: 2023-02-17

## 2023-02-17 ENCOUNTER — APPOINTMENT (EMERGENCY)
Dept: CT IMAGING | Facility: HOSPITAL | Age: 88
End: 2023-02-17

## 2023-02-17 ENCOUNTER — HOSPITAL ENCOUNTER (INPATIENT)
Facility: HOSPITAL | Age: 88
LOS: 3 days | Discharge: NURSING, REHAB OR LONG-TERM CARE | End: 2023-02-20
Attending: EMERGENCY MEDICINE | Admitting: INTERNAL MEDICINE
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
## 2023-02-17 DIAGNOSIS — L03.116 CELLULITIS OF LEFT LOWER EXTREMITY: ICD-10-CM

## 2023-02-17 DIAGNOSIS — S09.90XA INJURY OF HEAD, INITIAL ENCOUNTER: ICD-10-CM

## 2023-02-17 DIAGNOSIS — I48.91 ATRIAL FIBRILLATION WITH RVR (HCC): ICD-10-CM

## 2023-02-17 DIAGNOSIS — I50.20 HEART FAILURE WITH REDUCED EJECTION FRACTION (HCC): ICD-10-CM

## 2023-02-17 DIAGNOSIS — R55 SYNCOPE: ICD-10-CM

## 2023-02-17 DIAGNOSIS — R26.2 AMBULATORY DYSFUNCTION: ICD-10-CM

## 2023-02-17 LAB
2HR DELTA HS TROPONIN: -2 NG/L
4HR DELTA HS TROPONIN: -4 NG/L
ALBUMIN SERPL BCP-MCNC: 3 G/DL (ref 3.5–5)
ALP SERPL-CCNC: 77 U/L (ref 34–104)
ALT SERPL W P-5'-P-CCNC: 18 U/L (ref 7–52)
ANION GAP SERPL CALCULATED.3IONS-SCNC: 6 MMOL/L (ref 4–13)
APTT PPP: 32 SECONDS (ref 23–37)
AST SERPL W P-5'-P-CCNC: 31 U/L (ref 13–39)
BASOPHILS # BLD AUTO: 0.02 THOUSANDS/ÂΜL (ref 0–0.1)
BASOPHILS NFR BLD AUTO: 0 % (ref 0–1)
BILIRUB SERPL-MCNC: 2.05 MG/DL (ref 0.2–1)
BNP SERPL-MCNC: 1373 PG/ML (ref 0–100)
BUN SERPL-MCNC: 18 MG/DL (ref 5–25)
CALCIUM ALBUM COR SERPL-MCNC: 9.7 MG/DL (ref 8.3–10.1)
CALCIUM SERPL-MCNC: 8.9 MG/DL (ref 8.4–10.2)
CARDIAC TROPONIN I PNL SERPL HS: 28 NG/L
CARDIAC TROPONIN I PNL SERPL HS: 30 NG/L
CARDIAC TROPONIN I PNL SERPL HS: 32 NG/L
CHLORIDE SERPL-SCNC: 99 MMOL/L (ref 96–108)
CO2 SERPL-SCNC: 38 MMOL/L (ref 21–32)
CREAT SERPL-MCNC: 1.15 MG/DL (ref 0.6–1.3)
DIGOXIN SERPL-MCNC: 1.2 NG/ML (ref 0.8–2)
EOSINOPHIL # BLD AUTO: 0.1 THOUSAND/ÂΜL (ref 0–0.61)
EOSINOPHIL NFR BLD AUTO: 2 % (ref 0–6)
ERYTHROCYTE [DISTWIDTH] IN BLOOD BY AUTOMATED COUNT: 15.3 % (ref 11.6–15.1)
GFR SERPL CREATININE-BSD FRML MDRD: 56 ML/MIN/1.73SQ M
GLUCOSE SERPL-MCNC: 94 MG/DL (ref 65–140)
HCT VFR BLD AUTO: 51.4 % (ref 36.5–49.3)
HGB BLD-MCNC: 16.3 G/DL (ref 12–17)
IMM GRANULOCYTES # BLD AUTO: 0.01 THOUSAND/UL (ref 0–0.2)
IMM GRANULOCYTES NFR BLD AUTO: 0 % (ref 0–2)
INR PPP: 1.45 (ref 0.84–1.19)
LYMPHOCYTES # BLD AUTO: 1.17 THOUSANDS/ÂΜL (ref 0.6–4.47)
LYMPHOCYTES NFR BLD AUTO: 19 % (ref 14–44)
MCH RBC QN AUTO: 31.8 PG (ref 26.8–34.3)
MCHC RBC AUTO-ENTMCNC: 31.7 G/DL (ref 31.4–37.4)
MCV RBC AUTO: 100 FL (ref 82–98)
MONOCYTES # BLD AUTO: 0.57 THOUSAND/ÂΜL (ref 0.17–1.22)
MONOCYTES NFR BLD AUTO: 9 % (ref 4–12)
NEUTROPHILS # BLD AUTO: 4.23 THOUSANDS/ÂΜL (ref 1.85–7.62)
NEUTS SEG NFR BLD AUTO: 70 % (ref 43–75)
NRBC BLD AUTO-RTO: 0 /100 WBCS
PLATELET # BLD AUTO: 192 THOUSANDS/UL (ref 149–390)
PMV BLD AUTO: 10.1 FL (ref 8.9–12.7)
POTASSIUM SERPL-SCNC: 3.7 MMOL/L (ref 3.5–5.3)
PROT SERPL-MCNC: 6.2 G/DL (ref 6.4–8.4)
PROTHROMBIN TIME: 17.6 SECONDS (ref 11.6–14.5)
RBC # BLD AUTO: 5.13 MILLION/UL (ref 3.88–5.62)
SODIUM SERPL-SCNC: 143 MMOL/L (ref 135–147)
WBC # BLD AUTO: 6.1 THOUSAND/UL (ref 4.31–10.16)

## 2023-02-17 RX ORDER — ONDANSETRON 2 MG/ML
4 INJECTION INTRAMUSCULAR; INTRAVENOUS EVERY 6 HOURS PRN
Status: DISCONTINUED | OUTPATIENT
Start: 2023-02-17 | End: 2023-02-20 | Stop reason: HOSPADM

## 2023-02-17 RX ORDER — SERTRALINE HYDROCHLORIDE 100 MG/1
100 TABLET, FILM COATED ORAL DAILY
Status: DISCONTINUED | OUTPATIENT
Start: 2023-02-18 | End: 2023-02-20 | Stop reason: HOSPADM

## 2023-02-17 RX ORDER — ACETAMINOPHEN 325 MG/1
650 TABLET ORAL EVERY 4 HOURS PRN
Status: DISCONTINUED | OUTPATIENT
Start: 2023-02-17 | End: 2023-02-20 | Stop reason: HOSPADM

## 2023-02-17 RX ORDER — PANTOPRAZOLE SODIUM 40 MG/1
40 TABLET, DELAYED RELEASE ORAL DAILY
Status: DISCONTINUED | OUTPATIENT
Start: 2023-02-18 | End: 2023-02-20 | Stop reason: HOSPADM

## 2023-02-17 RX ORDER — POTASSIUM CHLORIDE 750 MG/1
10 TABLET, EXTENDED RELEASE ORAL 2 TIMES DAILY
Status: DISCONTINUED | OUTPATIENT
Start: 2023-02-17 | End: 2023-02-19

## 2023-02-17 RX ORDER — DIGOXIN 125 MCG
125 TABLET ORAL DAILY
Status: DISCONTINUED | OUTPATIENT
Start: 2023-02-18 | End: 2023-02-20

## 2023-02-17 RX ORDER — PRAVASTATIN SODIUM 40 MG
80 TABLET ORAL
Status: DISCONTINUED | OUTPATIENT
Start: 2023-02-17 | End: 2023-02-20 | Stop reason: HOSPADM

## 2023-02-17 RX ORDER — METOPROLOL SUCCINATE 50 MG/1
100 TABLET, EXTENDED RELEASE ORAL DAILY
Status: DISCONTINUED | OUTPATIENT
Start: 2023-02-18 | End: 2023-02-19

## 2023-02-17 RX ORDER — FUROSEMIDE 40 MG/1
40 TABLET ORAL DAILY
Status: DISCONTINUED | OUTPATIENT
Start: 2023-02-18 | End: 2023-02-18

## 2023-02-17 RX ADMIN — APIXABAN 5 MG: 5 TABLET, FILM COATED ORAL at 19:42

## 2023-02-17 RX ADMIN — PRAVASTATIN SODIUM 80 MG: 20 TABLET ORAL at 19:42

## 2023-02-17 RX ADMIN — POTASSIUM CHLORIDE 10 MEQ: 750 TABLET, EXTENDED RELEASE ORAL at 19:42

## 2023-02-17 NOTE — ED PROVIDER NOTES
Emergency Department Trauma Note  Maddie Gates 80 y o  male MRN: [id number]  Unit/Bed#: ED 02/ED 02 Encounter: [id number]      Trauma Alert: Trauma Acuity: Trauma Evaluation  Model of Arrival: Mode of Arrival: BLS via  EMS  Trauma Team: Current Providers  Attending Provider: Becka Pollard MD  Attending Provider: Giana Miller DO  Attending Provider: Anais Fuentes DO  Registered Nurse: Stephen Acuna RN  Graduate Nurse: Santana Dewitt  Physician Assistant: Benson Olivier PA-C  Registered Nurse: Joni Macdonald RN  Consultants:     None      History of Present Illness     Chief Complaint:   Chief Complaint   Patient presents with   • Fall     Per EMS, pt was found on ground by son; states he hit his head, + eliquis; Pt has EF of 10 per EMS     HPI:  Maddie Gates is a 80 y o  male who presents with no complaints after a fall from standing  Mechanism:Details of Incident: Fall, hit back of head, +eliquis, denies LOC; no complaints per pt at this time Injury Date: 02/17/23        80year-old male with history of A-fib, heart failure with reduced ejection fraction, hypertension presents to the emergency department for evaluation after a fall from standing  He states that he was in his kitchen when he lost balance without his walker  He felt his knees shake and subsequently fell backwards, striking the back of his head on his kitchen counter  He does admit to taking Eliquis daily for his A-fib  He has no complaints at this time  Denies headache, dizziness, chest pain, shortness of breath, vision changes, nausea, loss of consciousness, abdominal pain, neck pain, extremity pain  He states that he uses a cane at home to get around but a walker in public areas  Patient's son helps take care of him at home  He is AAOx4  Patient does have a history of ambulatory dysfunction       After discussion with patient's son, Annamaria Brewster, he states that he found his father laying on the ground in the kitchen about 2 minutes after the fall  He viewed the footage from his kitchen camera and it appears that the patient "syncopized " He was reaching for the refrigerator when he suddenly dropped to the floor  Son also states that for the past 2-3 days, the patient has not been himself  He has been more confused and his blood pressure has been low with systolic consistently in the 90s  The son stopped giving the cardiac medications yesterday because of this  He contacted Dr Schafer Prost office (cardio) and spoke with [address] [place] [address] who told him to go to the ED  This conversation was before the syncopal episode  I reviewed documentation  Dinorah recommending digoxin level  History provided by:  Patient   used: No    Fall  Associated symptoms: no abdominal pain, no back pain, no chest pain, no headaches, no seizures and no vomiting      Review of Systems   Constitutional: Negative for chills and fever  HENT: Negative for ear pain and sore throat  Eyes: Negative for pain and visual disturbance  Respiratory: Negative for cough and shortness of breath  Cardiovascular: Negative for chest pain and palpitations  Gastrointestinal: Negative for abdominal pain and vomiting  Genitourinary: Negative for dysuria and hematuria  Musculoskeletal: Negative for arthralgias and back pain  Skin: Negative for color change and rash  Neurological: Negative for dizziness, seizures, syncope and headaches  Psychiatric/Behavioral: Negative for confusion  All other systems reviewed and are negative  Historical Information     Immunizations:   Immunization History   Administered Date(s) Administered   • COVID-19 MODERNA VACC 0 5 ML IM 01/27/2021, 02/24/2021   • Influenza, high dose seasonal 0 7 mL 10/17/2022       Past Medical History:   Diagnosis Date   • Atrial fibrillation ([facility] [address] )    • CHF (congestive heart failure) ([facility] [address] )    • Hypertension      History reviewed  No pertinent family history    History reviewed  No pertinent surgical history  Social History     Tobacco Use   • Smoking status: Never   • Smokeless tobacco: Never   Vaping Use   • Vaping Use: Never used   Substance Use Topics   • Alcohol use: Not Currently   • Drug use: Never     E-Cigarette/Vaping   • E-Cigarette Use Never User      E-Cigarette/Vaping Substances   • Nicotine No    • THC No    • CBD No    • Flavoring No    • Other No    • Unknown No        Family History: non-contributory    Meds/Allergies   Prior to Admission Medications   Prescriptions Last Dose Informant Patient Reported? Taking? Cholecalciferol 50 MCG (2000 UT) CAPS   Yes No   Sig: Take 1 capsule by mouth 2 (two) times a day   apixaban (ELIQUIS) 5 mg   No No   Sig: Take 1 tablet (5 mg total) by mouth 2 (two) times a day   digoxin (LANOXIN) 0 125 mg tablet   No No   Sig: Take 1 tablet (125 mcg total) by mouth daily   furosemide (LASIX) 40 mg tablet   No No   Sig: Take 1 tablet (40 mg total) by mouth daily   metoprolol succinate (TOPROL-XL) 100 mg 24 hr tablet   No No   Sig: Take 1 tablet (100 mg total) by mouth daily   pantoprazole (PROTONIX) 40 mg tablet   Yes No   Sig: Take 40 mg by mouth daily   potassium chloride (Klor-Con) 10 mEq tablet   Yes No   Sig: Take 10 mEq by mouth 2 (two) times a day   Indications: Low Amount of Potassium in the Blood   sertraline (ZOLOFT) 50 mg tablet   Yes No   Sig: Take 100 mg by mouth daily   simvastatin (ZOCOR) 40 mg tablet   Yes No   Sig: Take 40 mg by mouth      Facility-Administered Medications: None       No Known Allergies    PHYSICAL EXAM    PE limited by: not limited    Objective   Vitals:   First set: Temperature: (!) 97 1 °F (36 2 °C) (02/17/23 1649)  Pulse: 93 (02/17/23 1649)  Respirations: 18 (02/17/23 1649)  Blood Pressure: 119/70 (02/17/23 1649)  SpO2: 95 % (02/17/23 1649)    Primary Survey:   (A) Airway: patent  (B) Breathing: without difficulty, lungs clear to auscultation bilaterally  (C) Circulation: Pulses:   normal  (D) Disabliity:  GCS Total:  15  (E) Expose:  Completed    Secondary Survey: (Click on Physical Exam tab above)  Physical Exam  Vitals and nursing note reviewed  Constitutional:       General: He is not in acute distress  Appearance: Normal appearance  He is well-developed  He is not ill-appearing  HENT:      Head: Normocephalic and atraumatic  Comments: No evidence of head strike on physical exam  No bruising, laceration, abrasion, tenderness or deformity  Right Ear: Tympanic membrane and external ear normal       Left Ear: Tympanic membrane and external ear normal       Nose: Nose normal       Mouth/Throat:      Mouth: Mucous membranes are moist       Pharynx: Oropharynx is clear  Eyes:      General: No scleral icterus  Right eye: No discharge  Left eye: No discharge  Conjunctiva/sclera: Conjunctivae normal       Pupils: Pupils are equal, round, and reactive to light  Neck:      Comments: c-spine cleared by clinical criteria  Cardiovascular:      Rate and Rhythm: Normal rate  Rhythm irregular  Pulses: Normal pulses  Heart sounds: Normal heart sounds  Pulmonary:      Effort: Pulmonary effort is normal  No respiratory distress  Breath sounds: Rales (bibaslar crackles) present  Comments: No increased work of breathing or tachypnea  No accessory muscle use  spO2 93% on RA  Abdominal:      General: Abdomen is flat  Bowel sounds are normal       Palpations: Abdomen is soft  Tenderness: There is no abdominal tenderness  There is no right CVA tenderness or left CVA tenderness  Musculoskeletal:         General: No swelling, tenderness or signs of injury  Normal range of motion  Cervical back: Normal range of motion and neck supple  No rigidity  Right lower leg: 3+ Pitting Edema present  Left lower leg: 3+ Pitting Edema present  Skin:     General: Skin is warm and dry  Capillary Refill: Capillary refill takes less than 2 seconds  Findings: No bruising, erythema or rash  Neurological:      General: No focal deficit present  Mental Status: He is alert and oriented to person, place, and time  Mental status is at baseline  Psychiatric:         Mood and Affect: Mood normal          Behavior: Behavior normal          Thought Content: Thought content normal          Cervical spine cleared by clinical criteria?  Yes     Invasive Devices     Peripheral Intravenous Line  Duration           Peripheral IV 02/17/23 Proximal;Right;Ventral (anterior) Forearm <1 day                Lab Results:   Results Reviewed     Procedure Component Value Units Date/Time    HS Troponin I 2hr [[id number]]     Lab Status: No result Specimen: Blood     HS Troponin 0hr (reflex protocol) [[id number]]  (Normal) Collected: 02/17/23 1742    Lab Status: Final result Specimen: Blood from Arm, Right Updated: 02/17/23 1816     hs TnI 0hr 32 ng/L     B-Type Natriuretic Peptide(BNP) [[id number]]  (Abnormal) Collected: 02/17/23 1742    Lab Status: Final result Specimen: Blood from Arm, Right Updated: 02/17/23 1815     BNP 1,373 pg/mL     Digoxin level [[id number]]  (Normal) Collected: 02/17/23 1742    Lab Status: Final result Specimen: Blood from Arm, Right Updated: 02/17/23 1811     Digoxin Lvl 1 2 ng/mL     Comprehensive metabolic panel [[id number]]  (Abnormal) Collected: 02/17/23 1742    Lab Status: Final result Specimen: Blood from Arm, Right Updated: 02/17/23 1811     Sodium 143 mmol/L      Potassium 3 7 mmol/L      Chloride 99 mmol/L      CO2 38 mmol/L      ANION GAP 6 mmol/L      BUN 18 mg/dL      Creatinine 1 15 mg/dL      Glucose 94 mg/dL      Calcium 8 9 mg/dL      Corrected Calcium 9 7 mg/dL      AST 31 U/L      ALT 18 U/L      Alkaline Phosphatase 77 U/L      Total Protein 6 2 g/dL      Albumin 3 0 g/dL      Total Bilirubin 2 05 mg/dL      eGFR 56 ml/min/1 73sq m     Narrative:      [place] guidelines for Chronic Kidney Disease (CKD):   • Stage 1 with normal or high GFR (GFR > 90 mL/min/1 73 square meters)  •  Stage 2 Mild CKD (GFR = 60-89 mL/min/1 73 square meters)  •  Stage 3A Moderate CKD (GFR = 45-59 mL/min/1 73 square meters)  •  Stage 3B Moderate CKD (GFR = 30-44 mL/min/1 73 square meters)  •  Stage 4 Severe CKD (GFR = 15-29 mL/min/1 73 square meters)  •  Stage 5 End Stage CKD (GFR <15 mL/min/1 73 square meters)  Note: GFR calculation is accurate only with a steady state creatinine    Protime-INR [[id number]]  (Abnormal) Collected: 02/17/23 1742    Lab Status: Final result Specimen: Blood from Arm, Right Updated: 02/17/23 1810     Protime 17 6 seconds      INR 1 45    APTT [[id number]]  (Normal) Collected: 02/17/23 1742    Lab Status: Final result Specimen: Blood from Arm, Right Updated: 02/17/23 1810     PTT 32 seconds     CBC and differential [[id number]]  (Abnormal) Collected: 02/17/23 1742    Lab Status: Final result Specimen: Blood from Arm, Right Updated: 02/17/23 1751     WBC 6 10 Thousand/uL      RBC 5 13 Million/uL      Hemoglobin 16 3 g/dL      Hematocrit 51 4 %       fL      MCH 31 8 pg      MCHC 31 7 g/dL      RDW 15 3 %      MPV 10 1 fL      Platelets 889 Thousands/uL      nRBC 0 /100 WBCs      Neutrophils Relative 70 %      Immat GRANS % 0 %      Lymphocytes Relative 19 %      Monocytes Relative 9 %      Eosinophils Relative 2 %      Basophils Relative 0 %      Neutrophils Absolute 4 23 Thousands/µL      Immature Grans Absolute 0 01 Thousand/uL      Lymphocytes Absolute 1 17 Thousands/µL      Monocytes Absolute 0 57 Thousand/µL      Eosinophils Absolute 0 10 Thousand/µL      Basophils Absolute 0 02 Thousands/µL     UA w Reflex to Microscopic w Reflex to Culture [[id number]]     Lab Status: No result Specimen: Urine                  Imaging Studies:   Direct to CT: No  XR Trauma chest portable   ED Interpretation by Porsha Saenz PA-C (02/17 1747)   Worsening pleural effusions noted        TRAUMA - CT head wo contrast   Final Result by Anastasia Chacon MD (02/17 1736)      No acute intracranial pathology  Chronic ischemic changes  Workstation performed: [id number]         TRAUMA - CT spine cervical wo contrast   Final Result by Anatsasia Chacon MD (02/17 1735)      No cervical spine fracture or traumatic malalignment  Partially imaged large right pleural effusion  Smaller left pleural effusion  Effusions have increased compared with chest x-ray from 12/1/2022  Workstation performed: [id number]               Procedures  POC FAST US    Date/Time: 2/17/2023 5:08 PM  Performed by: Moy Carbajal PA-C  Authorized by: Moy Carbajal PA-C     Patient location:  ED  Other Assisting Provider: No    Procedure details:     Exam Type:  Diagnostic    Indications: blunt abdominal trauma      Assess for:  Intra-abdominal fluid and pericardial effusion    Technique: FAST      Views obtained:  Heart - Pericardial sac, RUQ - Matias's Pouch, LUQ - Splenorenal space and Suprapubic - Pouch of Miugel    Image quality: diagnostic      Image availability:  Video obtained and images available in PACS  FAST Findings:     RUQ (Hepatorenal) free fluid: absent      LUQ (Splenorenal) free fluid: absent      Suprapubic free fluid: absent      Cardiac wall motion: identified      Pericardial effusion: absent    Interpretation:     Impressions: negative    Comments:      Right renal cyst visualized       ECG 12 Lead Documentation Only    Date/Time: 2/17/2023 6:02 PM  Performed by: Moy Carbajal PA-C  Authorized by: Moy Carbajal PA-C     Indications / Diagnosis:  Syncope  ECG reviewed by me, the ED Provider: yes    Patient location:  ED  Previous ECG:     Previous ECG:  Compared to current    Comparison ECG info:  Compared to ecg from 12/01/22    Similarity:  No change  Interpretation:     Interpretation: abnormal    Rate:     ECG rate:  80    ECG rate assessment: normal    Rhythm:     Rhythm: atrial fibrillation    Ectopy:     Ectopy: none    QRS:     QRS axis:  Left    QRS intervals:  Normal  Conduction:     Conduction: abnormal      Abnormal conduction: complete RBBB    ST segments:     ST segments:  Normal  T waves:     T waves: non-specific               ED Course  ED Course as of 02/17/23 1907   Fri Feb 17, 2023   1814 7 points on [place] Syncope Risk Scoring  40 3% risk of 30 day serious adverse event (death, arrhythmia, MI)   Dispo likely admit  Everette Schafer sent to [facility]Call [facility]  [facility] that sounds reasonable  Let's do inpatient tele please  6:35 PM  20 days left           Medical Decision Making  15-year-old male with history of A-fib, heart failure with reduced ejection fraction, hypertension presents to the emergency department for evaluation after a fall with head injury at home  Patient elicits no complaints at this time however he is on blood thinners  Trauma evaluation called prehospital   On further review of chart and after discussion with the patient's son, Tom Barillas, is apparent that patient had an episode of syncope  He was advised by his cardiologist to go to the emergency department for evaluation earlier in the day  On arrival, vital signs are stable  ABCs cleared  Awake, alert, oriented x4  There is 3+ pitting edema to bilateral lower extremities and bibasilar rales heard on lung exam consistent with his longstanding heart failure  No evidence of significant trauma, bruising, tenderness, wounds on exam   Neuro exam unremarkable  Due to patient's head injury, head CT and CT of the cervical spine for evaluation into traumatic abnormalities as patient is on blood thinners leaving him at higher risk for intracranial bleed  Cardiac work-up also ordered due to concern for cardiac syncope  Chest x-ray shows a worsening right-sided pleural effusion    ECG showing rate controlled A-fib with right bundle, consistent with previous EKGs  No leukocytosis  Digoxin level stable  BNP elevated at 1373  Heart score 6 with a troponin of 32  CT head showing no acute intracranial abnormality  Partially visualized right pleural effusion in the right lung noted on CT C-spine  Due to the concern for cardiac syncope, disposition likely admission  Patient is [facility] syncope risk score at 7 points leaving him at high risk for 30-day adverse event  Discussed the results of today's testing with the patient and recommended admission for further monitoring and cardiac work-up  Patient verbalizes understanding of the assessment and plan and is amenable to admission at this time  Discussed case with eleuterio Lemos [place] accepting for inpatient telemetry  Patient admitted in stable condition  Fall, initial encounter: acute illness or injury  Heart failure with reduced ejection fraction [facility]): chronic illness or injury  Injury of head, initial encounter: acute illness or injury  Syncope: acute illness or injury  Amount and/or Complexity of Data Reviewed  Labs: ordered  Radiology: ordered and independent interpretation performed  ECG/medicine tests: ordered and independent interpretation performed  Disposition  [facility] Transfer: No  Final diagnoses:   Fall, initial encounter   Syncope   Injury of head, initial encounter   Heart failure with reduced ejection fraction [facility])     Time reflects when diagnosis was documented in both MDM as applicable and the Disposition within this note     Time User Action Codes Description Comment    2/17/2023  6:43 PM Don Poot Add [D92  DYPX] Fall, initial encounter     2/17/2023  6:43 PM Don Poot Add [R55] Syncope     2/17/2023  6:43 PM Don Poot Add [S09 90XA] Injury of head, initial encounter     2/17/2023  6:45 PM Don Poot Add [I50 20] Heart failure with reduced ejection fraction ([address] ) ED Disposition     ED Disposition   Admit    Condition   Stable    Date/Time   Fri Feb 17, 2023  6:43 PM    Comment   Case was discussed with Dr Renan Sage and the patient's admission status was agreed to be Admission Status: inpatient status to the service of Dr Renan Sage   Follow-up Information    None       Patient's Medications   Discharge Prescriptions    No medications on file     No discharge procedures on file      PDMP Review       Value Time User    PDMP Reviewed  Yes 12/1/2022  3:41 PM Tamara Soto MD          ED Provider  Electronically Signed by         Nj Flores PA-C  02/17/23 4288

## 2023-02-17 NOTE — TELEPHONE ENCOUNTER
Patients son Zena Portillo called and wanted to let you know that Tim's Bp has been running low  91/60  When low patient feels nauseated and seems confused, not himself  He didn't give him his BP pill yesterday and BP was 91/60 this morning    He not sure what he is suppose to do

## 2023-02-18 PROBLEM — I50.23 ACUTE ON CHRONIC SYSTOLIC CONGESTIVE HEART FAILURE (HCC): Status: ACTIVE | Noted: 2022-01-01

## 2023-02-18 LAB
ALBUMIN SERPL BCP-MCNC: 2.8 G/DL (ref 3.5–5)
ALP SERPL-CCNC: 72 U/L (ref 34–104)
ALT SERPL W P-5'-P-CCNC: 17 U/L (ref 7–52)
ANION GAP SERPL CALCULATED.3IONS-SCNC: 4 MMOL/L (ref 4–13)
AST SERPL W P-5'-P-CCNC: 30 U/L (ref 13–39)
BILIRUB SERPL-MCNC: 1.99 MG/DL (ref 0.2–1)
BILIRUB UR QL STRIP: NEGATIVE
BUN SERPL-MCNC: 19 MG/DL (ref 5–25)
CALCIUM ALBUM COR SERPL-MCNC: 10.1 MG/DL (ref 8.3–10.1)
CALCIUM SERPL-MCNC: 9.1 MG/DL (ref 8.4–10.2)
CHLORIDE SERPL-SCNC: 100 MMOL/L (ref 96–108)
CLARITY UR: CLEAR
CO2 SERPL-SCNC: 39 MMOL/L (ref 21–32)
COLOR UR: YELLOW
CREAT SERPL-MCNC: 1.12 MG/DL (ref 0.6–1.3)
ERYTHROCYTE [DISTWIDTH] IN BLOOD BY AUTOMATED COUNT: 15.6 % (ref 11.6–15.1)
ERYTHROCYTE [DISTWIDTH] IN BLOOD BY AUTOMATED COUNT: 15.8 % (ref 11.6–15.1)
GFR SERPL CREATININE-BSD FRML MDRD: 58 ML/MIN/1.73SQ M
GLUCOSE SERPL-MCNC: 83 MG/DL (ref 65–140)
GLUCOSE UR STRIP-MCNC: NEGATIVE MG/DL
HCT VFR BLD AUTO: 47.3 % (ref 36.5–49.3)
HCT VFR BLD AUTO: 50.3 % (ref 36.5–49.3)
HGB BLD-MCNC: 15.2 G/DL (ref 12–17)
HGB BLD-MCNC: 16 G/DL (ref 12–17)
HGB UR QL STRIP.AUTO: NEGATIVE
KETONES UR STRIP-MCNC: NEGATIVE MG/DL
LEUKOCYTE ESTERASE UR QL STRIP: NEGATIVE
MAGNESIUM SERPL-MCNC: 1.6 MG/DL (ref 1.9–2.7)
MCH RBC QN AUTO: 32.1 PG (ref 26.8–34.3)
MCH RBC QN AUTO: 32.3 PG (ref 26.8–34.3)
MCHC RBC AUTO-ENTMCNC: 31.8 G/DL (ref 31.4–37.4)
MCHC RBC AUTO-ENTMCNC: 32.1 G/DL (ref 31.4–37.4)
MCV RBC AUTO: 100 FL (ref 82–98)
MCV RBC AUTO: 101 FL (ref 82–98)
NITRITE UR QL STRIP: NEGATIVE
PH UR STRIP.AUTO: 6 [PH]
PLATELET # BLD AUTO: 187 THOUSANDS/UL (ref 149–390)
PLATELET # BLD AUTO: 197 THOUSANDS/UL (ref 149–390)
PMV BLD AUTO: 10.2 FL (ref 8.9–12.7)
PMV BLD AUTO: 9.8 FL (ref 8.9–12.7)
POTASSIUM SERPL-SCNC: 3.6 MMOL/L (ref 3.5–5.3)
PROT SERPL-MCNC: 6.1 G/DL (ref 6.4–8.4)
PROT UR STRIP-MCNC: NEGATIVE MG/DL
RBC # BLD AUTO: 4.71 MILLION/UL (ref 3.88–5.62)
RBC # BLD AUTO: 4.98 MILLION/UL (ref 3.88–5.62)
SODIUM SERPL-SCNC: 143 MMOL/L (ref 135–147)
SP GR UR STRIP.AUTO: 1.01
UROBILINOGEN UR QL STRIP.AUTO: 4 E.U./DL
WBC # BLD AUTO: 6.05 THOUSAND/UL (ref 4.31–10.16)
WBC # BLD AUTO: 7.01 THOUSAND/UL (ref 4.31–10.16)

## 2023-02-18 RX ORDER — FUROSEMIDE 10 MG/ML
40 INJECTION INTRAMUSCULAR; INTRAVENOUS DAILY
Status: COMPLETED | OUTPATIENT
Start: 2023-02-18 | End: 2023-02-19

## 2023-02-18 RX ORDER — FUROSEMIDE 40 MG/1
40 TABLET ORAL DAILY
Status: DISCONTINUED | OUTPATIENT
Start: 2023-02-20 | End: 2023-02-20 | Stop reason: HOSPADM

## 2023-02-18 RX ADMIN — APIXABAN 5 MG: 5 TABLET, FILM COATED ORAL at 17:11

## 2023-02-18 RX ADMIN — FUROSEMIDE 40 MG: 10 INJECTION, SOLUTION INTRAMUSCULAR; INTRAVENOUS at 17:28

## 2023-02-18 RX ADMIN — DIGOXIN 125 MCG: 250 TABLET ORAL at 08:33

## 2023-02-18 RX ADMIN — POTASSIUM CHLORIDE 10 MEQ: 750 TABLET, EXTENDED RELEASE ORAL at 08:35

## 2023-02-18 RX ADMIN — PANTOPRAZOLE SODIUM 40 MG: 40 TABLET, DELAYED RELEASE ORAL at 08:35

## 2023-02-18 RX ADMIN — POTASSIUM CHLORIDE 10 MEQ: 750 TABLET, EXTENDED RELEASE ORAL at 17:11

## 2023-02-18 RX ADMIN — SERTRALINE HYDROCHLORIDE 100 MG: 50 TABLET ORAL at 08:35

## 2023-02-18 RX ADMIN — PRAVASTATIN SODIUM 80 MG: 20 TABLET ORAL at 17:11

## 2023-02-18 RX ADMIN — APIXABAN 5 MG: 5 TABLET, FILM COATED ORAL at 08:33

## 2023-02-18 NOTE — PLAN OF CARE
Problem: OCCUPATIONAL THERAPY ADULT  Goal: Performs self-care activities at highest level of function for planned discharge setting  See evaluation for individualized goals  Description: Treatment Interventions: ADL retraining, Functional transfer training, UE strengthening/ROM, Endurance training, Cognitive reorientation, Patient/family training, Compensatory technique education, Energy conservation, Activityengagement          See flowsheet documentation for full assessment, interventions and recommendations  Note: Limitation: Decreased ADL status, Decreased UE ROM, Decreased UE strength, Decreased Safe judgement during ADL, Decreased endurance  Prognosis: Good  Assessment: Pt is a 80 y o  male seen for OT evaluation s/p admit to [facility] on 2/17/2023 w/ Syncope  Comorbidities affecting pt's functional performance at time of assessment include: A-fib, CHF, chronic kidney disease, ambulatory dysfunction, CAD, hx of CVA, generalized weakness  Personal factors affecting pt at time of IE include:steps to enter environment, difficulty performing ADLS, difficulty performing IADLS , limited insight into deficits, decreased initiation and engagement  and health management   Prior to admission, pt required (A) for ADLs and IADLs  Pt reports receiving adequate family support and assistance at home to safely d/c home  Pt reports son assisting with showering and lower body tasks  Upon evaluation: the following deficits impact occupational performance: weakness, decreased ROM, decreased strength, decreased balance, decreased tolerance, impaired problem solving and decreased safety awareness  Pt to benefit from continued skilled OT tx while in the hospital to address deficits as defined above and maximize level of functional independence w ADL's and functional mobility   Occupational Performance areas to address include: grooming, bathing/shower, toilet hygiene, dressing, functional mobility, community mobility and clothing management  From OT standpoint, recommendation at time of d/c would be Home with home health OT  High complexity eval d/t significant medical hx, impaired cognitive status, age, significant assistance required for LB dressing and positive fall history and fall risk assessment       OT Discharge Recommendation: Home with home health rehabilitation     Whit Wilson MS, OTR/L

## 2023-02-18 NOTE — PHYSICAL THERAPY NOTE
Physical Therapy Evaluation   Time in: 0751  Time out: 0810  Total evaluation time: 19 minutes    Patient's Name: Doc Lopez    Admitting Diagnosis  Syncope [R55]  Head injury [S09 90XA]  Injury of head, initial encounter [S09 90XA]  Fall, initial encounter [W19  XXXA]  Heart failure with reduced ejection fraction (HCC) [I50 20]    Problem List  Patient Active Problem List   Diagnosis   • Atrial fibrillation with RVR (HCC)   • Chronic systolic congestive heart failure (HCC)   • CAD (coronary artery disease), native coronary artery   • History of CVA (cerebrovascular accident)   • Benign essential HTN   • Hallucinations   • Chronic respiratory failure with hypoxia ([facility] )   • DANIEL (acute kidney injury) ([facility] )   • Bacteremia   • Goals of care, counseling/discussion   • Chronic kidney disease, stage 3a ([facility] )   • Ambulatory dysfunction   • Generalized weakness   • Gastroesophageal reflux disease without esophagitis   • Anxiety and depression   • Syncope       Past Medical History  Past Medical History:   Diagnosis Date   • Atrial fibrillation ([facility] )    • [facility]    • Hypertension        Past Surgical History  History reviewed  No pertinent surgical history  PT performed at least 2 patient identifiers during session: Name and wristband        02/18/23 0810   PT Last Visit   PT Visit Date 02/18/23   Note Type   Note type Evaluation   Pain Assessment   Pain Assessment Tool 0-10   Pain Score No Pain   Restrictions/Precautions   Weight Bearing Precautions Per Order No   Other Precautions Cognitive; Bed Alarm; Fall Risk;Telemetry;Multiple lines   Home Living   Type of [address] [place] [address] One level;Performs ADLs on one level; Able to live on main level with bedroom/bathroom;Stairs to enter with rails  (1 GEENA)   Bathroom Shower/Tub Tub/shower unit   Bathroom Toilet Standard   Bathroom Equipment Grab bars in shower; Shower chair   Bathroom Accessibility Accessible   Home Equipment Walker;Cane  (Pt reports SPC usage around the house and RW for community mobility)   Prior Function   Level of [place] Needs assistance with ADLs; Independent with functional mobility; Needs assistance with IADLS   Lives With Erica Quezada Help From Family   IADLs Family/Friend/Other provides transportation; Family/Friend/Other provides meals; Family/Friend/Other provides medication management   Falls in the last 6 months 1 to 4  (Pt reports 2 falls)   Vocational Retired   General   Family/Caregiver Present No   Cognition   Arousal/Participation Alert   Orientation Level Oriented to person;Oriented to time;Oriented to situation   Memory Decreased recall of precautions   Following Commands Follows one step commands with increased time or repetition   Comments pt agreeable to PT session   Subjective   Subjective "I can get up for breakfast"   RLE Assessment   RLE Assessment X   Strength RLE   RLE Overall Strength 3+/5   LLE Assessment   LLE Assessment X   Strength LLE   LLE Overall Strength 3+/5   Coordination   Movements are Fluid and Coordinated 0   Sensation WFL   Bed Mobility   Supine to Sit 5  Supervision   Additional items Assist x 1;HOB elevated; Increased time required   Transfers   Sit to Stand 5  Supervision   Additional items Assist x 1; Increased time required;Verbal cues   Stand to Sit 5  Supervision   Additional items Assist x 1; Increased time required;Verbal cues   Additional Comments orthostatic BPs obtained:    Patient position for vital signs Vital signs obtained Pt response/subjective reports   Supine BP = 125/69; HR = 106 bpm; SpO2 on 0 lpm = 92% Pt denies any lightheadedness/dizziness   Seated BP = 91/58; HR = 123 bpm; SpO2 on 0 lpm = 92% Pt denies any lightheadedness/dizziness   Standing BP = 104/72; HR = 115 bpm Pt denies any lightheadedness/dizziness      Ambulation/Elevation   Gait pattern Improper Weight shift;Decreased foot clearance; Forward Flexion; Wide COSME;Shuffling; Short stride   Gait Assistance   (CGA for safety)   Additional items Assist x 1;Verbal cues  (VCs for appropriate distance with standing using RW)   Assistive Device Rolling walker   Distance 22 ft   Stair Management Assistance Not tested   Balance   Static Sitting Good   Dynamic Sitting Fair +   Static Standing Fair   Dynamic Standing Fair -   Ambulatory Fair -   Endurance Deficit   Endurance Deficit Yes   Activity Tolerance   Activity Tolerance Patient limited by fatigue   Medical Staff Made Aware coordination of care provided with OT [id number] Jolly Schafer Yes, RN made aware of outcomes/rcs   Assessment   Prognosis Fair   Problem List Decreased strength;Decreased endurance; Impaired balance;Decreased mobility; Decreased cognition; Impaired judgement;Decreased safety awareness   Assessment Pt is 80 y o  male seen for high-complexity PT evaluation on 2/18/2023 s/p admit to [place] [address] on 2/17/2023 w/ Syncope; pt presented to ED s/p fall  PT was consulted to assess pt's functional mobility and d/c needs  Order placed for PT eval and tx, w/ up and OOB as tolerated order  PTA, pt lives in [address] [place] [address] 1 GEENA c son, amb c SPC vs RW at baseline, assistance for ADLs/IADLs provided by family  At time of eval, pt requires SUP for bed mob and transfers, CGA x 22 ft amb c RW use  Upon evaluation, pt presenting with impaired functional mobility d/t decreased strength, decreased endurance, impaired balance, decreased mobility, decreased cognition, impaired judgement and decreased safety awareness  Pertinent PMHx and current co-morbidities affecting pt's physical performance at time of assessment include: ambulatory dysfunction, CHF, CKD stage 3a, AFib with RVR, cardiomyopathy with EF of 10-15%  Personal factors affecting pt at time of eval include: decreased cognition, positive fall history, decreased initiation and engagement, limited insight into impairments and increased age   The following objective measures performed on IE also reveal limitations: AM-PAC 6-Clicks: 26/76  Pt's clinical presentation is currently unstable/unpredictable seen in pt's presentation of tachycardia, abnormal lab value(s), need for input for task focus and mobility technique and ongoing medical assessment  Overall, pt's rehab potential and prognosis to return to PLOF is fair as impacted by objective findings, warranting pt to receive further skilled PT interventions to address identified impairments, activity limitation(s), and participation restriction(s)  Goal for patient is to return home  Pt to benefit from continued PT tx to address deficits as defined above and maximize level of functional independent mobility and consistency in order for pt to improve safety with OOB mobility tasks  From PT/mobility standpoint, recommendation at time of d/c would be home with home health rehabilitation pending progress in order to facilitate return to PLOF  Barriers to Discharge None   Goals   Patient Goals "to go home"   STG Expiration Date 02/28/23   Short Term Goal #1 In 7-10 days: Increase bilateral LE strength 1/2 grade to facilitate independent mobility, Perform all bed mobility tasks modified independent to decrease caregiver burden, Perform all transfers with distant S to improve independence, Ambulate > 100 ft  with least restrictive assistive device with close S w/o LOB and w/ normalized gait pattern 100% of the time, Navigate 1 stairs with close S with unilateral handrail to facilitate return to previous living environment, Increase all balance 1/2 grade to decrease risk for falls, Complete exercise program independently, Tolerate 4 hr OOB to faciliate upright tolerance and PT provider will perform functional balance assessment to determine fall risk   PT Treatment Day 0   Plan   Treatment/Interventions LE strengthening/ROM; Functional transfer training;Elevations; Therapeutic exercise; Endurance training;Cognitive reorientation;Patient/family training;Equipment eval/education; Bed mobility;Gait training;Spoke to nursing;Spoke to case management   PT Frequency 3-5x/wk   Recommendation   PT Discharge Recommendation Home with home health rehabilitation   Equipment Recommended   (per pt, he has RW and SPC for home use)   Additional Comments Pt's raw score on the AM-PAC Basic Mobility inpatient short form is 18, standardized score is 41 05  Patients at this level are likely to benefit from DC to home with home care, however, please refer to therapist recommendation for safe DC planning  AM-PAC Basic Mobility Inpatient   Turning in Flat Bed Without Bedrails 4   Lying on Back to Sitting on Edge of Flat Bed Without Bedrails 3   Moving Bed to Chair 3   Standing Up From Chair Using Arms 3   Walk in Room 3   Climb 3-5 Stairs With Railing 2   Basic Mobility Inpatient Raw Score 18   Basic Mobility Standardized Score 41 05   Highest Level Of Mobility   JH-HLM Goal 6: Walk 10 steps or more   JH-HLM Achieved 6: Walk 10 steps or more   End of Consult   Patient Position at End of Consult Bedside chair; All needs within reach       Thony Joseph, PT, DPT

## 2023-02-18 NOTE — ASSESSMENT & PLAN NOTE
Wt Readings from Last 3 Encounters:   02/17/23 112 kg (246 lb 0 5 oz)   12/02/22 107 kg (236 lb 12 4 oz)   11/02/22 107 kg (235 lb 9 6 oz)     · Echo 10/14/22: EF 10-15%, severe global hypokinesis, mild aortic stenosis  · Continue Toprol and po lasix  · Low salt diet  · Daily weights, I&O's  · Cardiology consulted in the setting of sycope

## 2023-02-18 NOTE — ASSESSMENT & PLAN NOTE
Wt Readings from Last 3 Encounters:   02/18/23 106 kg (233 lb 14 5 oz)   12/02/22 107 kg (236 lb 12 4 oz)   11/02/22 107 kg (235 lb 9 6 oz)     · Echo 10/14/22: EF 10-15%, severe global hypokinesis, mild aortic stenosis  · Patient with elevated BNP, vascular congestion on x-ray, lower extremity edema  · Will transition to IV Lasix x2 doses  · Continue Toprol  · Low salt diet  · Daily weights, I&O's  · Cardiology consulted

## 2023-02-18 NOTE — ASSESSMENT & PLAN NOTE
· Heart rate currently rate controlled  · Monitor on telemetry  · Continue Toprol and digoxin  · Digoxin level 1 2  · Anticoagulated on eliquis

## 2023-02-18 NOTE — PLAN OF CARE
Problem: PAIN - ADULT  Goal: Verbalizes/displays adequate comfort level or baseline comfort level  Description: Interventions:  - Encourage patient to monitor pain and request assistance  - Assess pain using appropriate pain scale  - Administer analgesics based on type and severity of pain and evaluate response  - Implement non-pharmacological measures as appropriate and evaluate response  - Consider cultural and social influences on pain and pain management  - Notify physician/advanced practitioner if interventions unsuccessful or patient reports new pain  Outcome: Progressing     Problem: SAFETY ADULT  Goal: Patient will remain free of falls  Description: INTERVENTIONS:  - Educate patient/family on patient safety including physical limitations  - Instruct patient to call for assistance with activity   - Consult OT/PT to assist with strengthening/mobility   - Keep Call bell within reach  - Keep bed low and locked with side rails adjusted as appropriate  - Keep care items and personal belongings within reach  - Initiate and maintain comfort rounds  - Make Fall Risk Sign visible to staff  - Offer Toileting every 2 Hours, in advance of need  - Initiate/Maintain bed alarm  - Obtain necessary fall risk management equipment: fall mgmt  - Apply yellow socks and bracelet for high fall risk patients  - Consider moving patient to room near nurses station  Outcome: Progressing  Goal: Maintain or return to baseline ADL function  Description: INTERVENTIONS:  -  Assess patient's ability to carry out ADLs; assess patient's baseline for ADL function and identify physical deficits which impact ability to perform ADLs (bathing, care of mouth/teeth, toileting, grooming, dressing, etc )  - Assess/evaluate cause of self-care deficits   - Assess range of motion  - Assess patient's mobility; develop plan if impaired  - Assess patient's need for assistive devices and provide as appropriate  - Encourage maximum independence but intervene and supervise when necessary  - Involve family in performance of ADLs  - Assess for home care needs following discharge   - Consider OT consult to assist with ADL evaluation and planning for discharge  - Provide patient education as appropriate  Outcome: Progressing  Goal: Maintains/Returns to pre admission functional level  Description: INTERVENTIONS:  - Perform BMAT or MOVE assessment daily    - Set and communicate daily mobility goal to care team and patient/family/caregiver  - Collaborate with rehabilitation services on mobility goals if consulted  - Perform Range of Motion 3 times a day  - Reposition patient every 2 hours  - Dangle patient 3 times a day  - Stand patient 3 times a day  - Ambulate patient 3 times a day  - Out of bed to chair 3 times a day   - Out of bed for meals 3 times a day  - Out of bed for toileting  - Record patient progress and toleration of activity level   Outcome: Progressing     Problem: DISCHARGE PLANNING  Goal: Discharge to home or other facility with appropriate resources  Description: INTERVENTIONS:  - Identify barriers to discharge w/patient and caregiver  - Arrange for needed discharge resources and transportation as appropriate  - Identify discharge learning needs (meds, wound care, etc )  - Arrange for interpretive services to assist at discharge as needed  - Refer to Case Management Department for coordinating discharge planning if the patient needs post-hospital services based on physician/advanced practitioner order or complex needs related to functional status, cognitive ability, or social support system  Outcome: Progressing     Problem: Knowledge Deficit  Goal: Patient/family/caregiver demonstrates understanding of disease process, treatment plan, medications, and discharge instructions  Description: Complete learning assessment and assess knowledge base    Interventions:  - Provide teaching at level of understanding  - Provide teaching via preferred learning methods  Outcome: Progressing

## 2023-02-18 NOTE — ASSESSMENT & PLAN NOTE
· Patient fell at home with concern for syncopal event per son  · CT head: negative for acute findings  · Echo 10/14/22: LVEF 10-15%, severe global hypokinesis, mild aortic stenosis  · Initial troponin 32, trended down  · Currently stable with no further episodes in the hospital  · Cardiology consult pending

## 2023-02-18 NOTE — ASSESSMENT & PLAN NOTE
· Patient's son reports patient has been weaker recently and he's been trying to get PT ordered     · UA pending  · PT/OT recommending home with home care

## 2023-02-18 NOTE — PLAN OF CARE
Problem: PHYSICAL THERAPY ADULT  Goal: Performs mobility at highest level of function for planned discharge setting  See evaluation for individualized goals  Description: Treatment/Interventions: LE strengthening/ROM, Functional transfer training, Elevations, Therapeutic exercise, Endurance training, Cognitive reorientation, Patient/family training, Equipment eval/education, Bed mobility, Gait training, Spoke to nursing, Spoke to case management  Equipment Recommended:  (per pt, he has RW and SPC for home use)       See flowsheet documentation for full assessment, interventions and recommendations  Note: Prognosis: Fair  Problem List: Decreased strength, Decreased endurance, Impaired balance, Decreased mobility, Decreased cognition, Impaired judgement, Decreased safety awareness  Assessment: Pt is 80 y o  male seen for high-complexity PT evaluation on 2/18/2023 s/p admit to [facility]  on 2/17/2023 w/ Syncope; pt presented to ED s/p fall  PT was consulted to assess pt's functional mobility and d/c needs  Order placed for PT eval and tx, w/ up and OOB as tolerated order  PTA, pt lives in [address] [place] [address] 1 GEENA c son, amb c SPC vs RW at baseline, assistance for ADLs/IADLs provided by family  At time of eval, pt requires SUP for bed mob and transfers, CGA x 22 ft amb c RW use  Upon evaluation, pt presenting with impaired functional mobility d/t decreased strength, decreased endurance, impaired balance, decreased mobility, decreased cognition, impaired judgement and decreased safety awareness  Pertinent PMHx and current co-morbidities affecting pt's physical performance at time of assessment include: ambulatory dysfunction, CHF, CKD stage 3a, AFib with RVR, cardiomyopathy with EF of 10-15%  Personal factors affecting pt at time of eval include: decreased cognition, positive fall history, decreased initiation and engagement, limited insight into impairments and increased age   The following objective measures performed on IE also reveal limitations: AM-PAC 6-Clicks: 46/44  Pt's clinical presentation is currently unstable/unpredictable seen in pt's presentation of tachycardia, abnormal lab value(s), need for input for task focus and mobility technique and ongoing medical assessment  Overall, pt's rehab potential and prognosis to return to PLOF is fair as impacted by objective findings, warranting pt to receive further skilled PT interventions to address identified impairments, activity limitation(s), and participation restriction(s)  Goal for patient is to return home  Pt to benefit from continued PT tx to address deficits as defined above and maximize level of functional independent mobility and consistency in order for pt to improve safety with OOB mobility tasks  From PT/mobility standpoint, recommendation at time of d/c would be home with home health rehabilitation pending progress in order to facilitate return to PLOF  Barriers to Discharge: None     PT Discharge Recommendation: Home with home health rehabilitation    See flowsheet documentation for full assessment

## 2023-02-18 NOTE — ASSESSMENT & PLAN NOTE
· Heart rate currently rate controlled  · Telemetry in the setting of syncope  · Continue Toprol and digoxin  · Digoxin level 1 2  · Anticoagulated on eliquis

## 2023-02-18 NOTE — PROGRESS NOTES
Luis Enrique 45  Progress Note Pradeep Nolasco 1935, 80 y o  male MRN: [id number]  Unit/Bed#: -01 Encounter: [id number]  Primary Care Provider: Naida Castillo DO   Date and time admitted to hospital: 2/17/2023  4:42 PM    * Syncope  Assessment & Plan  · Patient fell at home with concern for syncopal event per son  · CT head: negative for acute findings  · Echo 10/14/22: LVEF 10-15%, severe global hypokinesis, mild aortic stenosis  · Initial troponin 32, trended down  · Currently stable with no further episodes in the hospital  · Cardiology consult pending    Acute on chronic systolic congestive heart failure (HCC)  Assessment & Plan  Wt Readings from Last 3 Encounters:   02/18/23 106 kg (233 lb 14 5 oz)   12/02/22 107 kg (236 lb 12 4 oz)   11/02/22 107 kg (235 lb 9 6 oz)     · Echo 10/14/22: EF 10-15%, severe global hypokinesis, mild aortic stenosis  · Patient with elevated BNP, vascular congestion on x-ray, lower extremity edema  · Will transition to IV Lasix x2 doses  · Continue Toprol  · Low salt diet  · Daily weights, I&O's  · Cardiology consulted    Ambulatory dysfunction  Assessment & Plan  · Patient's son reports patient has been weaker recently and he's been trying to get PT ordered     · UA pending  · PT/OT recommending home with home care    Chronic kidney disease, stage 3a [facility])  Assessment & Plan  Lab Results   Component Value Date    EGFR 58 02/18/2023    EGFR 56 02/17/2023    EGFR 62 12/01/2022    CREATININE 1 12 02/18/2023    CREATININE 1 15 02/17/2023    CREATININE 1 07 12/01/2022     · Creatinine appears to around baseline  · Continue to monitor    Atrial fibrillation with RVR ([facility])  Assessment & Plan  · Heart rate currently rate controlled  · Monitor on telemetry  · Continue Toprol and digoxin  · Digoxin level 1 2  · Anticoagulated on eliquis     VTE Prophylaxis:  Apixaban (Eliquis)    Patient Centered Rounds: I have performed bedside rounds with nursing staff today     Discussions with Specialists or Other Care Team Provider: yes  Education and Discussions with Family / Patient: Spoke with patient's son Junior Ching over the phone regarding plan of care    Current Length of Stay: 1 day(s)    Current Patient Status: Inpatient   Certification Statement: The patient will continue to require additional inpatient hospital stay due to CHF exacerbation    Discharge Plan: Hopeful discharge home tomorrow with rehab    Code Status: Level 3 - DNAR and DNI    Subjective:   No overnight events noted  Patient with shortness of breath and lower extremity edema  Denies any chest pain or palpitations  Objective:     Vitals:   Temp (24hrs), Av 5 °F (36 4 °C), Min:97 1 °F (36 2 °C), Max:97 7 °F (36 5 °C)    Temp:  [97 1 °F (36 2 °C)-97 7 °F (36 5 °C)] 97 3 °F (36 3 °C)  HR:  [] 63  Resp:  [16-23] 20  BP: ()/(54-90) 132/76  SpO2:  [84 %-99 %] 97 %  Body mass index is 31 72 kg/m²  Input and Output Summary (last 24 hours): Intake/Output Summary (Last 24 hours) at 2023 1559  Last data filed at 2023 0673  Gross per 24 hour   Intake 220 ml   Output --   Net 220 ml       Physical Exam:   Physical Exam  Constitutional:       General: He is not in acute distress  Appearance: He is obese  HENT:      Head: Normocephalic and atraumatic  Nose: Nose normal       Mouth/Throat:      Mouth: Mucous membranes are moist    Eyes:      Extraocular Movements: Extraocular movements intact  Conjunctiva/sclera: Conjunctivae normal    Cardiovascular:      Rate and Rhythm: Normal rate and regular rhythm  Pulmonary:      Effort: Pulmonary effort is normal  No respiratory distress  Abdominal:      Palpations: Abdomen is soft  Tenderness: There is no abdominal tenderness  Musculoskeletal:         General: Normal range of motion  Cervical back: Normal range of motion and neck supple  Right lower leg: Edema present  Left lower leg: Edema present  Skin:     General: Skin is warm and dry  Neurological:      General: No focal deficit present  Mental Status: He is alert  Mental status is at baseline  Cranial Nerves: No cranial nerve deficit  Psychiatric:         Mood and Affect: Mood normal          Behavior: Behavior normal          Additional Data:     Labs:    Results from last 7 days   Lab Units 02/18/23  0544 02/17/23  1742   WBC Thousand/uL 7 01 6 10   HEMOGLOBIN g/dL 16 0 16 3   HEMATOCRIT % 50 3* 51 4*   PLATELETS Thousands/uL 197 192   NEUTROS PCT %  --  70   LYMPHS PCT %  --  19   MONOS PCT %  --  9   EOS PCT %  --  2     Results from last 7 days   Lab Units 02/18/23  0544   SODIUM mmol/L 143   POTASSIUM mmol/L 3 6   CHLORIDE mmol/L 100   CO2 mmol/L 39*   BUN mg/dL 19   CREATININE mg/dL 1 12   CALCIUM mg/dL 9 1   ALK PHOS U/L 72   ALT U/L 17   AST U/L 30     Results from last 7 days   Lab Units 02/17/23  1742   INR  1 45*               * I Have Reviewed All Lab Data Listed Above  * Additional Pertinent Lab Tests Reviewed:  Amarilis 66 Admission  Reviewed    Imaging:  Imaging Reports Reviewed Today Include: No new imaging    Recent Cultures (last 7 days):           Last 24 Hours Medication List:   Current Facility-Administered Medications   Medication Dose Route Frequency Provider Last Rate   • acetaminophen  650 mg Oral Q4H PRN Kieran Meyer PA-C     • apixaban  5 mg Oral BID Kieran Meyer PA-C     • digoxin  125 mcg Oral Daily Kieran Meyer PA-C     • furosemide  40 mg Intravenous Daily Sabrina Murillo MD     • [START ON 2/20/2023] furosemide  40 mg Oral Daily Sabrina Murillo MD     • metoprolol succinate  100 mg Oral Daily Kieran Meyer PA-C     • ondansetron  4 mg Intravenous Q6H PRN Kieran Meyer PA-C     • pantoprazole  40 mg Oral Daily Kieran Meyer PA-C     • potassium chloride  10 mEq Oral BID Kieran Meyer PA-C     • pravastatin  80 mg Oral Daily With LytleANAM Encinas • sertraline  100 mg Oral Daily Vijaya Bolaños PA-C          Today, Patient Was Seen By: Denise Andrew MD    ** Please Note: Dictation voice to text software may have been used in the creation of this document   **

## 2023-02-18 NOTE — ASSESSMENT & PLAN NOTE
Lab Results   Component Value Date    EGFR 56 02/17/2023    EGFR 62 12/01/2022    EGFR 54 10/24/2022    CREATININE 1 15 02/17/2023    CREATININE 1 07 12/01/2022    CREATININE 1 19 10/24/2022     · Creatinine appears to around baseline  · Continue to monitor

## 2023-02-18 NOTE — ASSESSMENT & PLAN NOTE
· Patient fell at home with concern for syncopal event per son  · Concern syncope that is cardiac in origin given history of cardiomyopathy  · CT head: negative for acute findings  · Echo 10/14/22: LVEF 10-15%, severe global hypokinesis, mild aortic stenosis  · Troponin 32  · Telemetry  · Neuro checks  · PT/OT  · Cardiology consult

## 2023-02-18 NOTE — ASSESSMENT & PLAN NOTE
Lab Results   Component Value Date    EGFR 58 02/18/2023    EGFR 56 02/17/2023    EGFR 62 12/01/2022    CREATININE 1 12 02/18/2023    CREATININE 1 15 02/17/2023    CREATININE 1 07 12/01/2022     · Creatinine appears to around baseline  · Continue to monitor

## 2023-02-18 NOTE — H&P
Luis Enrique 45  H&P- Chapis Fink 1935, 80 y o  male MRN: [id number]  Unit/Bed#: ED 02 Encounter: [id number]  Primary Care Provider: Shakira Santos DO   Date and time admitted to hospital: 2/17/2023  4:42 PM    * Syncope  Assessment & Plan  · Patient fell at home with concern for syncopal event per son  · Concern syncope that is cardiac in origin given history of cardiomyopathy  · CT head: negative for acute findings  · Echo 10/14/22: LVEF 10-15%, severe global hypokinesis, mild aortic stenosis  · Troponin 32  · Telemetry  · Neuro checks  · PT/OT  · Cardiology consult    Ambulatory dysfunction  Assessment & Plan  · Patient's son reports patient has been weaker recently and he's been trying to get PT ordered  · UA pending  · PT/OT    Chronic systolic congestive heart failure (HCC)  Assessment & Plan  Wt Readings from Last 3 Encounters:   02/17/23 112 kg (246 lb 0 5 oz)   12/02/22 107 kg (236 lb 12 4 oz)   11/02/22 107 kg (235 lb 9 6 oz)     · Echo 10/14/22: EF 10-15%, severe global hypokinesis, mild aortic stenosis  · Continue Toprol and po lasix  · Low salt diet  · Daily weights, I&O's  · Cardiology consulted in the setting of sycope        Chronic kidney disease, stage 3a [facility])  Assessment & Plan  Lab Results   Component Value Date    EGFR 56 02/17/2023    EGFR 62 12/01/2022    EGFR 54 10/24/2022    CREATININE 1 15 02/17/2023    CREATININE 1 07 12/01/2022    CREATININE 1 19 10/24/2022     · Creatinine appears to around baseline  · Continue to monitor    Atrial fibrillation with RVR (HCC)  Assessment & Plan  · Heart rate currently rate controlled  · Telemetry in the setting of syncope  · Continue Toprol and digoxin  · Digoxin level 1 2  · Anticoagulated on eliquis       VTE Pharmacologic Prophylaxis: VTE Score: 4 Moderate Risk (Score 3-4) - Pharmacological DVT Prophylaxis Ordered: apixaban (Eliquis)    Code Status: Level 3 - DNAR and DNI son  Discussion with family: Updated  (son) via phone  Anticipated Length of Stay: Patient will be admitted on an inpatient basis with an anticipated length of stay of greater than 2 midnights secondary to workup of syncope/fall  Total Time Spent on Date of Encounter in care of patient: 75 minutes This time was spent on one or more of the following: performing physical exam; counseling and coordination of care; obtaining or reviewing history; documenting in the medical record; reviewing/ordering tests, medications or procedures; communicating with other healthcare professionals and discussing with patient's family/caregivers  Chief Complaint: fall, possible syncope    History of Present Illness:  Baylee Mcintosh is a 80 y o  male with a PMH of cardiomyopathy with EF of 10-15%, CKD, Afib who presents after a fall at home  The patient states he was walking in the kitchen when he felt his knees give out and fell backward hitting his head off the counter  He states he was lying on the floor for a minute or two when his son came in and found him on the floor  The patient denies any dizziness or lightheadedness prior to the fall  He denies loss of consciousness  The patient's son states he was wake when he found him  He states he has a video camera in the kitchen and watched the video  He states his father was up walking around and was seen at the refrigerator and he fell  He state it looked as if his father may have had a syncopal episode  He states he was unable to get his father up off the floor and called EMS  CT head negative  CT C spine without without acute findings  Troponin of 32  The patient denies any chest pain, shortness of breath, lightheadedness/dizziness, abdominal pain, nausea/vomiting, or diarrhea  He denies fevers/chills  Review of Systems:  Review of Systems   Musculoskeletal: Positive for gait problem  All other systems reviewed and are negative        Past Medical and Surgical History:   Past Medical History:   Diagnosis Date • Atrial fibrillation (HCC)    • CHF (congestive heart failure) ([facility] [address] )    • Hypertension        History reviewed  No pertinent surgical history  Meds/Allergies:  Prior to Admission medications    Medication Sig Start Date End Date Taking? Authorizing Provider   apixaban (ELIQUIS) 5 mg Take 1 tablet (5 mg total) by mouth 2 (two) times a day 11/2/22 12/2/22  Naheed Slight, DO   Cholecalciferol 50 MCG (2000 UT) CAPS Take 1 capsule by mouth 2 (two) times a day    Historical Provider, MD   digoxin (LANOXIN) 0 125 mg tablet Take 1 tablet (125 mcg total) by mouth daily 11/14/22 12/14/22  Naheed Slight, DO   furosemide (LASIX) 40 mg tablet Take 1 tablet (40 mg total) by mouth daily 11/14/22 12/14/22  Naheed Slight, DO   metoprolol succinate (TOPROL-XL) 100 mg 24 hr tablet Take 1 tablet (100 mg total) by mouth daily 11/14/22 12/14/22  Naheed Slight, DO   pantoprazole (PROTONIX) 40 mg tablet Take 40 mg by mouth daily    Historical Provider, MD   potassium chloride (Klor-Con) 10 mEq tablet Take 10 mEq by mouth 2 (two) times a day  Indications: Low Amount of Potassium in the Blood 10/18/22   JACOB Rao   sertraline (ZOLOFT) 50 mg tablet Take 100 mg by mouth daily 7/5/11   Historical Provider, MD   simvastatin (ZOCOR) 40 mg tablet Take 40 mg by mouth 4/19/11   Historical Provider, MD     I have reviewed home medications using recent Epic encounter  Allergies: No Known Allergies    Social History:  Marital Status: /Civil Union   Occupation: retired   Patient Pre-hospital Living Situation: Home  Patient Pre-hospital Level of Mobility: walks with walker  Patient Pre-hospital Diet Restrictions: low sodium  Substance Use History:   Social History     Substance and Sexual Activity   Alcohol Use Not Currently     Social History     Tobacco Use   Smoking Status Never   Smokeless Tobacco Never     Social History     Substance and Sexual Activity   Drug Use Never       Family History:  History reviewed   No pertinent family history  Physical Exam:     Vitals:   Blood Pressure: 135/80 (02/17/23 1815)  Pulse: 95 (02/17/23 1815)  Temperature: 97 7 °F (36 5 °C) (02/17/23 1730)  Temp Source: Oral (02/17/23 1730)  Respirations: 20 (02/17/23 1815)  Weight - Scale: 112 kg (246 lb 0 5 oz) (02/17/23 1649)  SpO2: 97 % (02/17/23 1815)    Physical Exam  Vitals and nursing note reviewed  Constitutional:       General: He is awake  Appearance: Normal appearance  He is morbidly obese  HENT:      Head: Normocephalic and atraumatic  Cardiovascular:      Rate and Rhythm: Normal rate and regular rhythm  Pulmonary:      Effort: Pulmonary effort is normal       Breath sounds: Normal breath sounds  Abdominal:      Palpations: Abdomen is soft  Tenderness: There is no abdominal tenderness  Musculoskeletal:      Right lower leg: Edema present  Left lower leg: Edema present  Skin:     General: Skin is warm and dry  Neurological:      General: No focal deficit present  Mental Status: He is alert and oriented to person, place, and time  Psychiatric:         Attention and Perception: Attention normal          Mood and Affect: Mood normal          Speech: Speech normal          Behavior: Behavior is cooperative            Additional Data:     Lab Results:  Results from last 7 days   Lab Units 02/17/23  1742   WBC Thousand/uL 6 10   HEMOGLOBIN g/dL 16 3   HEMATOCRIT % 51 4*   PLATELETS Thousands/uL 192   NEUTROS PCT % 70   LYMPHS PCT % 19   MONOS PCT % 9   EOS PCT % 2     Results from last 7 days   Lab Units 02/17/23  1742   SODIUM mmol/L 143   POTASSIUM mmol/L 3 7   CHLORIDE mmol/L 99   CO2 mmol/L 38*   BUN mg/dL 18   CREATININE mg/dL 1 15   ANION GAP mmol/L 6   CALCIUM mg/dL 8 9   ALBUMIN g/dL 3 0*   TOTAL BILIRUBIN mg/dL 2 05*   ALK PHOS U/L 77   ALT U/L 18   AST U/L 31   GLUCOSE RANDOM mg/dL 94     Results from last 7 days   Lab Units 02/17/23  1742   INR  1 45*                   Lines/Drains:  Invasive Devices     Peripheral Intravenous Line  Duration           Peripheral IV 02/17/23 Proximal;Right;Ventral (anterior) Forearm <1 day                    Imaging: Reviewed radiology reports from this admission including: CT head  XR Trauma chest portable   ED Interpretation by Porsha Saenz PA-C (02/17 1747)   Worsening pleural effusions noted  TRAUMA - CT head wo contrast   Final Result by Rachna Johnson MD (02/17 1736)      No acute intracranial pathology  Chronic ischemic changes  Workstation performed: [id number]         TRAUMA - CT spine cervical wo contrast   Final Result by Rachna Johnson MD (02/17 1735)      No cervical spine fracture or traumatic malalignment  Partially imaged large right pleural effusion  Smaller left pleural effusion  Effusions have increased compared with chest x-ray from 12/1/2022  Workstation performed: [id number]             EKG and Other Studies Reviewed on Admission:   · EKG: Atrial fibrillation  HR 80     ** Please Note: This note has been constructed using a voice recognition system   **

## 2023-02-18 NOTE — OCCUPATIONAL THERAPY NOTE
Occupational Therapy Evaluation     Patient Name: Maddie Gates  BXSWZ'Y Date: 2/18/2023  Problem List  Principal Problem:    Syncope  Active Problems:    Atrial fibrillation with RVR (HCC)    Chronic systolic congestive heart failure (HCC)    Chronic kidney disease, stage 3a ([facility] [address] )    Ambulatory dysfunction    Past Medical History  Past Medical History:   Diagnosis Date    Atrial fibrillation (HCC)     CHF (congestive heart failure) ([facility] [address] )     Hypertension      Past Surgical History  History reviewed  No pertinent surgical history  02/18/23 0751   OT Last Visit   OT Visit Date 02/18/23   Note Type   Note type Evaluation   Additional Comments Pt seen as a co-eval with PT due to the patient's co-morbidities, clinically unstable presentation, and present impairments which are a regression from the patient's baseline  Pain Assessment   Pain Assessment Tool 0-10   Pain Score No Pain   Restrictions/Precautions   Other Precautions Cognitive; Chair Alarm; Bed Alarm; Fall Risk   Home Living   Type of [address] [place] [address] One level;Stairs to enter with rails; Able to live on main level with bedroom/bathroom; Performs ADLs on one level  (1 GEENA)   Bathroom Shower/Tub Tub/shower unit   Bathroom Toilet Standard   Bathroom Equipment Grab bars in shower; Shower chair   Bathroom Accessibility Accessible   Home Equipment Walker;Cane  (Pt reports SPC usage around the house and RW for community mobility)   Prior Function   Level of [place] Needs assistance with ADLs; Independent with functional mobility; Needs assistance with IADLS   Lives With Dock Lux Help From Family   IADLs Family/Friend/Other provides transportation; Family/Friend/Other provides meals; Family/Friend/Other provides medication management   Falls in the last 6 months 1 to 4  (Pt reports 2 falls)   Vocational Retired   Subjective   Subjective "I get help at home from my son"   ADL   Where Assessed Edge of bed   UB Bathing Assistance 5  Supervision/Setup LB Bathing Assistance 3  Moderate Assistance   UB Dressing Assistance 5  Supervision/Setup   LB Dressing Assistance 2  Maximal Assistance   LB Dressing Deficit Don/doff R sock; Don/doff L sock  (Pt reports son assists with LB dressing at home)   Bed Mobility   Supine to Sit 5  Supervision   Additional items Assist x 1;HOB elevated; Increased time required   Sit to Supine   (DNT; pt seated in recliner upon conclusion of session)   Transfers   Sit to Stand 5  Supervision   Additional items Assist x 1; Increased time required;Verbal cues   Stand to Sit 5  Supervision   Additional items Assist x 1; Increased time required;Verbal cues   Stand pivot 5  Supervision   Additional items Assist x 1; Increased time required   Additional Comments orthostatic BP, supine: 125/69   Balance   Static Sitting Good   Dynamic Sitting Fair +   Static Standing Fair   Dynamic Standing Fair -   Ambulatory Fair -   Activity Tolerance   Activity Tolerance Patient limited by fatigue   Nurse Made Aware Yes, RN made aware of recs/outcomes   RUE Assessment   RUE Assessment WFL   RUE Strength   RUE Overall Strength   (3/5)   LUE Assessment   LUE Assessment WFL   LUE Strength   LUE Overall Strength   (3/5)   Psychosocial   Psychosocial (WDL) WDL   Cognition   Overall Cognitive Status Impaired   Arousal/Participation Alert; Responsive; Cooperative   Attention Attends with cues to redirect   Orientation Level Oriented to person;Oriented to time;Oriented to situation   Memory Decreased recall of precautions   Following Commands Follows one step commands with increased time or repetition   Comments Pt agreeable to OT evaluation   Assessment   Limitation Decreased ADL status; Decreased UE ROM; Decreased UE strength;Decreased Safe judgement during ADL;Decreased endurance   Prognosis Good   Assessment Pt is a 80 y o  male seen for OT evaluation s/p admit to [facility] on 2/17/2023 w/ Syncope    Comorbidities affecting pt's functional performance at time of assessment include: A-fib, CHF, chronic kidney disease, ambulatory dysfunction, CAD, hx of CVA, generalized weakness  Personal factors affecting pt at time of IE include:steps to enter environment, difficulty performing ADLS, difficulty performing IADLS , limited insight into deficits, decreased initiation and engagement  and health management   Prior to admission, pt required (A) for ADLs and IADLs  Pt reports receiving adequate family support and assistance at home to safely d/c home  Pt reports son assisting with showering and lower body tasks  Upon evaluation: the following deficits impact occupational performance: weakness, decreased ROM, decreased strength, decreased balance, decreased tolerance, impaired problem solving and decreased safety awareness  Pt to benefit from continued skilled OT tx while in the hospital to address deficits as defined above and maximize level of functional independence w ADL's and functional mobility  Occupational Performance areas to address include: grooming, bathing/shower, toilet hygiene, dressing, functional mobility, community mobility and clothing management  From OT standpoint, recommendation at time of d/c would be Home with home health OT  High complexity eval d/t significant medical hx, impaired cognitive status, age, significant assistance required for LB dressing and positive fall history and fall risk assessment  Goals   Patient Goals "to go home"   Plan   Treatment Interventions ADL retraining;Functional transfer training;UE strengthening/ROM; Endurance training;Cognitive reorientation;Patient/family training; Compensatory technique education; Energy conservation; Activityengagement   Goal Expiration Date 02/28/23   OT Treatment Day 0   OT Frequency 3-5x/wk   Recommendation   OT Discharge Recommendation Home with home health rehabilitation   Additional Comments  The patient's raw score on the AM-PAC Daily Activity inpatient short form is 19, standardized score is 40 22, greater than 39 4  Patients at this level are likely to benefit from discharge to home  Please refer to the recommendation of the Occupational Therapist for safe discharge planning     AM-PAC Daily Activity Inpatient   Lower Body Dressing 2   Bathing 3   Toileting 3   Upper Body Dressing 3   Grooming 4   Eating 4   Daily Activity Raw Score 19   Daily Activity Standardized Score (Calc for Raw Score >=11) 40 22   AM-PAC Applied Cognition Inpatient   Following a Speech/Presentation 3   Understanding Ordinary Conversation 3   Taking Medications 3   Remembering Where Things Are Placed or Put Away 3   Remembering List of 4-5 Errands 3   Taking Care of Complicated Tasks 3   Applied Cognition Raw Score 18   Applied Cognition Standardized Score 38 07     GOALS:    Pt will achieve the following within specified time frame: STG  Pt will achieve the following goals within 5 days    *ADL transfers with (S) for inc'd independence with ADLs/purposeful tasks    *UB ADL with (I) for inc'd independence with self cares    *LB ADL with Mod (A) using AE prn for inc'd independence with self cares    *Toileting with (S) for clothing management and hygiene for return to PLOF with personal care    *Increase static stand balance and dyn stand balance to F+ for inc'd safety with standing purposeful tasks    *Increase stand tolerance x3 m for inc'd tolerance with standing purposeful tasks    *Participate in 10m UE therex to increase overall stamina/activity tolerance for purposeful tasks    *Bed mobility- (I) for inc'd independence to manage own comfort and initiate EOB & OOB purposeful tasks    Pt will achieve the following within specified time frame: LTG  Pt will achieve the following goals within 10 days    *ADL transfers with (I) for inc'd independence with ADLs/purposeful tasks    *LB ADL with (S) using AE prn for inc'd independence with self cares    *Toileting with (I) for clothing management and hygiene for return to PLOF with personal care    *Increase static stand balance and dyn stand balance to G for inc'd safety with standing purposeful tasks    *Increase stand tolerance x7 m for inc'd tolerance with standing purposeful tasks        Mishel Acharya MS, OTR/L

## 2023-02-18 NOTE — ASSESSMENT & PLAN NOTE
· Patient's son reports patient has been weaker recently and he's been trying to get PT ordered     · UA pending  · PT/OT

## 2023-02-19 ENCOUNTER — HOME HEALTH ADMISSION (OUTPATIENT)
Dept: HOME HEALTH SERVICES | Facility: HOME HEALTHCARE | Age: 88
End: 2023-02-19

## 2023-02-19 PROBLEM — J90 PLEURAL EFFUSION ON RIGHT: Status: ACTIVE | Noted: 2023-02-19

## 2023-02-19 PROBLEM — L03.119 CELLULITIS OF LOWER EXTREMITY: Status: ACTIVE | Noted: 2023-02-19

## 2023-02-19 LAB
ANION GAP SERPL CALCULATED.3IONS-SCNC: 4 MMOL/L (ref 4–13)
ATRIAL RATE: 80 BPM
BUN SERPL-MCNC: 19 MG/DL (ref 5–25)
CALCIUM SERPL-MCNC: 8.6 MG/DL (ref 8.4–10.2)
CHLORIDE SERPL-SCNC: 103 MMOL/L (ref 96–108)
CO2 SERPL-SCNC: 37 MMOL/L (ref 21–32)
CREAT SERPL-MCNC: 1.02 MG/DL (ref 0.6–1.3)
GFR SERPL CREATININE-BSD FRML MDRD: 65 ML/MIN/1.73SQ M
GLUCOSE SERPL-MCNC: 83 MG/DL (ref 65–140)
MAGNESIUM SERPL-MCNC: 2.1 MG/DL (ref 1.9–2.7)
POTASSIUM SERPL-SCNC: 3.4 MMOL/L (ref 3.5–5.3)
QRS AXIS: -68 DEGREES
QRSD INTERVAL: 154 MS
QT INTERVAL: 460 MS
QTC INTERVAL: 530 MS
SODIUM SERPL-SCNC: 144 MMOL/L (ref 135–147)
T WAVE AXIS: 123 DEGREES
VENTRICULAR RATE: 80 BPM

## 2023-02-19 RX ORDER — METOPROLOL SUCCINATE 50 MG/1
50 TABLET, EXTENDED RELEASE ORAL ONCE
Status: COMPLETED | OUTPATIENT
Start: 2023-02-19 | End: 2023-02-19

## 2023-02-19 RX ORDER — CEFTRIAXONE 1 G/50ML
1000 INJECTION, SOLUTION INTRAVENOUS EVERY 24 HOURS
Status: DISCONTINUED | OUTPATIENT
Start: 2023-02-19 | End: 2023-02-20

## 2023-02-19 RX ORDER — POTASSIUM CHLORIDE 20 MEQ/1
40 TABLET, EXTENDED RELEASE ORAL DAILY
Status: DISCONTINUED | OUTPATIENT
Start: 2023-02-19 | End: 2023-02-20 | Stop reason: HOSPADM

## 2023-02-19 RX ORDER — MAGNESIUM SULFATE HEPTAHYDRATE 40 MG/ML
2 INJECTION, SOLUTION INTRAVENOUS ONCE
Status: COMPLETED | OUTPATIENT
Start: 2023-02-19 | End: 2023-02-19

## 2023-02-19 RX ORDER — POTASSIUM CHLORIDE 20 MEQ/1
20 TABLET, EXTENDED RELEASE ORAL ONCE
Status: COMPLETED | OUTPATIENT
Start: 2023-02-19 | End: 2023-02-19

## 2023-02-19 RX ORDER — FUROSEMIDE 10 MG/ML
40 INJECTION INTRAMUSCULAR; INTRAVENOUS ONCE
Status: COMPLETED | OUTPATIENT
Start: 2023-02-19 | End: 2023-02-19

## 2023-02-19 RX ADMIN — METOPROLOL SUCCINATE 50 MG: 50 TABLET, EXTENDED RELEASE ORAL at 18:17

## 2023-02-19 RX ADMIN — MAGNESIUM SULFATE HEPTAHYDRATE 2 G: 40 INJECTION, SOLUTION INTRAVENOUS at 04:00

## 2023-02-19 RX ADMIN — PANTOPRAZOLE SODIUM 40 MG: 40 TABLET, DELAYED RELEASE ORAL at 08:58

## 2023-02-19 RX ADMIN — SERTRALINE HYDROCHLORIDE 100 MG: 50 TABLET ORAL at 08:58

## 2023-02-19 RX ADMIN — CEFTRIAXONE 1000 MG: 1 INJECTION, SOLUTION INTRAVENOUS at 11:08

## 2023-02-19 RX ADMIN — DIGOXIN 125 MCG: 250 TABLET ORAL at 08:58

## 2023-02-19 RX ADMIN — PRAVASTATIN SODIUM 80 MG: 20 TABLET ORAL at 16:08

## 2023-02-19 RX ADMIN — POTASSIUM CHLORIDE 20 MEQ: 1500 TABLET, EXTENDED RELEASE ORAL at 04:00

## 2023-02-19 RX ADMIN — METOPROLOL SUCCINATE 100 MG: 50 TABLET, EXTENDED RELEASE ORAL at 08:58

## 2023-02-19 RX ADMIN — APIXABAN 5 MG: 5 TABLET, FILM COATED ORAL at 18:17

## 2023-02-19 RX ADMIN — APIXABAN 5 MG: 5 TABLET, FILM COATED ORAL at 08:58

## 2023-02-19 RX ADMIN — FUROSEMIDE 40 MG: 10 INJECTION, SOLUTION INTRAMUSCULAR; INTRAVENOUS at 08:59

## 2023-02-19 RX ADMIN — POTASSIUM CHLORIDE 40 MEQ: 1500 TABLET, EXTENDED RELEASE ORAL at 08:57

## 2023-02-19 RX ADMIN — FUROSEMIDE 40 MG: 10 INJECTION, SOLUTION INTRAMUSCULAR; INTRAVENOUS at 16:08

## 2023-02-19 NOTE — PLAN OF CARE
Problem: PHYSICAL THERAPY ADULT  Goal: Performs mobility at highest level of function for planned discharge setting  See evaluation for individualized goals  Description: Treatment/Interventions: LE strengthening/ROM, Functional transfer training, Elevations, Therapeutic exercise, Endurance training, Cognitive reorientation, Patient/family training, Equipment eval/education, Bed mobility, Gait training, Spoke to nursing, Spoke to case management  Equipment Recommended:  (per pt, he has RW and SPC for home use)       See flowsheet documentation for full assessment, interventions and recommendations  Outcome: Adequate for Discharge  Note: Prognosis: Good  Problem List: Decreased strength, Decreased endurance, Impaired balance, Decreased mobility, Decreased cognition, Impaired judgement, Decreased safety awareness  Assessment: Pt seen for PT treatment session this date, consisting of ther act focused on bed mobility & functional transfer training, gt training on level surfaces to improve pt safety in household environment and elevation training for enter/exit home  Since previous session, pt has made good progress in terms of improved OOB mobility tolerance including amb x 88 ft/household distances with SUP level and use of RW  Pertinent barriers during this session include SOB  Current goals and POC remain appropriate, pt continues to have rehab potential and is making good progress towards STGs  Pt prognosis for achieving goals is good, pending pt progress with hospitalization/medical status improvements, and indicated by ability to follow directions, attention span, self-expression (thoughts, feelings, needs), motivation, supportive family/caregivers and previous response to intervention  PT recommends home with home health rehabilitation upon discharge  Pt continues to be functioning below baseline level, and remains limited 2* factors listed above   PT will continue to see pt during current hospitalization in order to address the deficits listed above and provide interventions consistent w/ POC in effort to achieve STGs  Barriers to Discharge: None     PT Discharge Recommendation: Home with home health rehabilitation    See flowsheet documentation for full assessment

## 2023-02-19 NOTE — CASE MANAGEMENT
Case Management Assessment & Discharge Planning Note    Patient name Chapis Fink  Location [address] 205/-[id number] MRN [id number]  : 1935 Date 2023       Current Admission Date: 2023  Current Admission Diagnosis:Syncope   Patient Active Problem List    Diagnosis Date Noted   • Syncope 2023   • Gastroesophageal reflux disease without esophagitis 2022   • Anxiety and depression 2022   • Ambulatory dysfunction 2022   • Generalized weakness 2022   • Chronic kidney disease, stage 3a ([facility] [address] ) 10/25/2022   • Goals of care, counseling/discussion 10/17/2022   • Bacteremia 10/16/2022   • DANIEL (acute kidney injury) ([facility] [address] ) 10/14/2022   • Atrial fibrillation with RVR ([facility] [address] ) 10/13/2022   • Acute on chronic systolic congestive heart failure ([facility] [address] ) 10/13/2022   • CAD (coronary artery disease), native coronary artery 10/13/2022   • History of CVA (cerebrovascular accident) 10/13/2022   • Benign essential HTN 10/13/2022   • Hallucinations 10/13/2022   • Chronic respiratory failure with hypoxia ([facility] [address] ) 10/13/2022      LOS (days): 1  Geometric Mean LOS (GMLOS) (days): 2 30  Days to GMLOS:1 2     OBJECTIVE:    Risk of Unplanned Readmission Score: 12 44         Current admission status: Inpatient  Referral Reason: Other (d/c planning)    Preferred Pharmacy:   [address] [place] [address] #[id number] Isabelle Thompson - [address]  [address]  [place] [address]  Phone: [phone] Fax: [phone]    Primary Care Provider: Shakira Santos DO    Primary Insurance: MEDICARE  Secondary Insurance: [address] [place] [address]    ASSESSMENT:  [address] [place] [address] Representative - Son   Primary Phone: [phone] (Mobile)               Advance Directives  Does patient have a [address]?: Yes (family needs to bring in paperwork)  Does patient have Advance Directives?: No  Was patient offered paperwork?: Yes (decline paperwork)         Readmission Root Cause  30 Day Readmission: No    Patient Information  Admitted from[de-identified] Home  Mental Status: Alert  During Assessment patient was accompanied by: Other-Comment (nephew and spoke to son via speaker phone in the room)  Assessment information provided by[de-identified] Son  Primary Caregiver: Family  Caregiver's Name[de-identified] Regi Sheth Relationship to Patient[de-identified] Family Member (son)  Jewell Saenz Telephone Number[de-identified] [phone]  Support Systems: [address] of Residence: [address] do you live in?: [address] entry access options  Select all that apply : Stairs  Number of steps to enter home  : 1  Do the steps have railings?: No  Type of Current Residence: [place]  In the last 12 months, was there a time when you were not able to pay the mortgage or rent on time?: No  In the last 12 months, how many places have you lived?: 1  In the last 12 months, was there a time when you did not have a steady place to sleep or slept in a shelter (including now)?: No  Homeless/housing insecurity resource given?: N/A  Living Arrangements: Lives w/ Son (lives with Junior Ching and his other son that is disabled)  Is patient a ?: No    Activities of Daily Living Prior to Admission  Functional Status: Assistance (cooking, cleaning, shopping, driving, meds)  Completes ADLs independently?: No  Level of ADL dependence: Assistance (bathing & dressing)  Ambulates independently?: Yes  Does patient use assisted devices?: Yes  Assisted Devices (DME) used: Quad Cane, Home Oxygen concentrator  DME Company Name (respiratory supplies): [employer]  O2 Rate(s): 4 liters at bedtime  Does patient currently own DME?: Yes  What DME does the patient currently own?: [facility] [employer]  Does patient have a history of Outpatient Therapy (PT/OT)?: No  Does the patient have a history of Short-Term Rehab?: Yes (family unsure of the name)  Does patient have a history of HHC?: Yes ([facility])  Does patient currently have [address]?: No         Patient Information Continued  Income Source: Pension/assisted  Does patient have prescription coverage?: Yes ([employer] [place])  Within the past 12 months, you worried that your food would run out before you got the money to buy more : Never true  Within the past 12 months, the food you bought just didn't last and you didn't have money to get more : Never true  Food insecurity resource given?: N/A  Does patient receive dialysis treatments?: No  Does patient have a history of substance abuse?: No  Does patient have a history of Mental Health Diagnosis?: Yes (depression)  Is patient receiving treatment for mental health?: Yes (mediction  pcp)  Has patient received inpatient treatment related to mental health in the last 2 years?: No         Means of Transportation  Means of Transport to Appts[de-identified] Family transport  In the past 12 months, has lack of transportation kept you from medical appointments or from getting medications?: No  In the past 12 months, has lack of transportation kept you from meetings, work, or from getting things needed for daily living?: No  Was application for public transport provided?: N/A        DISCHARGE DETAILS:    Discharge planning discussed with[de-identified] patient and Manas Melara ( nephew)  and son via speaker phone  Freedom of Choice: Yes  Comments - Freedom of Choice: recommendation is for [facility]- permission was givent to place a blanket referral  CM contacted family/caregiver?: Yes             Contacts  Patient Contacts: Connor Mauro  Relationship to Patient[de-identified] Family (son)  Contact Method: Phone  Phone Number: [phone]  Reason/Outcome: Discharge [address]         Is the patient interested in [facility] at discharge?: Yes  [address] requested[de-identified] Nursing, Occupational Therapy, Physical [address] Name[de-identified] Other  [facility] Provider[de-identified] PCP  [address] Needed[de-identified] Strengthening/Theraputic Exercises to Improve Function, Other (comment), Heart Failure Management, Oxygen Via Nasal Cannula (neuro assesment, , vs, review medications)  Oxygen LPM Ordered (if applicable based on home health services needed):: 4 LPM (oxygen at bedtime)  Homebound Criteria Met[de-identified] Uses an Assist Device (i e  cane, walker, etc), Requires the Assistance of Another Person for Safe Ambulation or to Leave the Home  Supporting Clincal Findings[de-identified] Limited Endurance    DME Referral Provided  Referral made for DME?: No    Other Referral/Resources/Interventions Provided:  Interventions: HHC  Referral Comments: referrals sent for [facility]    Would you like to participate in our [address] service program?  : No - Declined    Treatment Team Recommendation: Home with [address] [facility] (home with son & c & outpt follow up- family)

## 2023-02-19 NOTE — ASSESSMENT & PLAN NOTE
· Patient fell at home with concern for syncopal event per son  · CT head: negative for acute findings  · Echo 10/14/22: LVEF 10-15%, severe global hypokinesis, mild aortic stenosis  · Initial troponin 32, trended down  · Currently stable with no further episodes in the hospital  · Cardiology input appreciated

## 2023-02-19 NOTE — OCCUPATIONAL THERAPY NOTE
Occupational Therapy Treatment Note      Asim Jack    2/19/2023    Principal Problem:    Syncope  Active Problems:    Atrial fibrillation with RVR (HCC)    Acute on chronic systolic congestive heart failure (HCC)    Chronic kidney disease, stage 3a (HCC)    Ambulatory dysfunction      Past Medical History:   Diagnosis Date    Atrial fibrillation (HCC)     CHF (congestive heart failure) ([facility] [address] )     Hypertension        History reviewed  No pertinent surgical history  02/19/23 1021   OT Last Visit   OT Visit Date 02/19/23   Note Type   Note Type Treatment   Pain Assessment   Pain Assessment Tool 0-10   Pain Score No Pain   Restrictions/Precautions   Weight Bearing Precautions Per Order No   Other Precautions Bed Alarm; Chair Alarm; Fall Risk   ADL   Where Assessed Edge of bed   LB Dressing Assistance 2  Maximal Assistance   LB Dressing Deficit Don/doff R sock; Don/doff L sock   LB Dressing Comments Pt reports son at home available to assist with LB dressing  Bed Mobility   Supine to Sit 6  Modified independent   Additional items Assist x 1;HOB elevated   Additional Comments SpO2 on RA at EOB prior to mobility = 97%, HR = 98 bpm   Transfers   Sit to Stand 5  Supervision   Additional items Assist x 1; Increased time required   Stand to Sit 5  Supervision   Additional items Assist x 1; Increased time required   Functional Mobility   Functional Mobility 5  Supervision   Additional Comments Pt completed functional mobility from bed > hallway > recliner at (S) level w/ RW and no LOB observed  VC for proper body mechanics with RW management  Additional items Rolling walker   Subjective   Subjective "My son helps me with socks and shoes"   Cognition   Overall Cognitive Status [facility]   Arousal/Participation Alert; Responsive; Cooperative   Attention Attends with cues to redirect   Orientation Level Oriented X4   Memory Decreased recall of precautions   Following Commands Follows one step commands without difficulty   Comments Pt agreeable to OT session   Activity Tolerance   Activity Tolerance Patient tolerated treatment well   Medical Staff Made Aware Yes, RN aware of recs/outcomes   Assessment   Assessment   Patient participated in Skilled OT session this date with interventions consisting of ADL re training with the use of correct body mechnaics, Energy Conservation techniques and  therapeutic activities to: increase activity tolerance   Patient agreeable to OT treatment session, upon arrival patient was found supine in bed  In comparison to previous session, patient with improvements in bed mobility and functional mobility   Patient requiring verbal cues for safety  Patient continues to be functioning below baseline level, occupational performance remains limited secondary to factors listed above and increased risk for falls and injury  From OT standpoint, recommendation at time of d/c would be Home OT  Patient to benefit from continued Occupational Therapy treatment while in the hospital to address deficits as defined above and maximize level of functional independence with ADLs and functional mobility  Plan   Treatment Interventions ADL retraining;Functional transfer training;UE strengthening/ROM; Endurance training;Patient/family training; Compensatory technique education; Energy conservation; Activityengagement   Goal Expiration Date 02/28/23   OT Treatment Day 1   OT Frequency 3-5x/wk   Recommendation   OT Discharge Recommendation Home with home health rehabilitation   Additional Comments  The patient's raw score on the AM-PAC Daily Activity inpatient short form is 20, standardized score is 42 03, greater than 39 4  Patients at this level are likely to benefit from discharge to home  Please refer to the recommendation of the Occupational Therapist for safe discharge planning     Additional Comments 2 Pt seen as a co-session with PT due to the patient's co-morbidities and present impairments which are a regression from the patient's baseline     AM-PAC Daily Activity Inpatient   Lower Body Dressing 2   Bathing 3   Toileting 3   Upper Body Dressing 4   Grooming 4   Eating 4   Daily Activity Raw Score 20   Daily Activity Standardized Score (Calc for Raw Score >=11) 42 03   AM-PAC Applied Cognition Inpatient   Following a Speech/Presentation 3   Understanding Ordinary Conversation 3   Taking Medications 3   Remembering Where Things Are Placed or Put Away 3   Remembering List of 4-5 Errands 3   Taking Care of Complicated Tasks 3   Applied Cognition Raw Score 18   Applied Cognition Standardized Score 38 07     Jayjay Ontiveros MS, OTR/L

## 2023-02-19 NOTE — ASSESSMENT & PLAN NOTE
· We will continue diuresis as tolerated  · Patient is not requiring oxygen currently  · May benefit from pulmonology evaluation, to determine need for thoracentesis however not urgent given patient clinically improving with diuresis

## 2023-02-19 NOTE — CASE MANAGEMENT
Case Management Discharge Planning Note    Patient name Edin Bell  Location /-45 MRN [id number]  : 1935 Date 2023       Current Admission Date: 2023  Current Admission Diagnosis:Syncope   Patient Active Problem List    Diagnosis Date Noted   • Syncope 2023   • Gastroesophageal reflux disease without esophagitis 2022   • Anxiety and depression 2022   • Ambulatory dysfunction 2022   • Generalized weakness 2022   • Chronic kidney disease, stage 3a ([facility] [address] ) 10/25/2022   • Goals of care, counseling/discussion 10/17/2022   • Bacteremia 10/16/2022   • DANIEL (acute kidney injury) ([facility] [address] ) 10/14/2022   • Atrial fibrillation with RVR ([facility] [address] ) 10/13/2022   • Acute on chronic systolic congestive heart failure ([facility] [address] ) 10/13/2022   • CAD (coronary artery disease), native coronary artery 10/13/2022   • History of CVA (cerebrovascular accident) 10/13/2022   • Benign essential HTN 10/13/2022   • Hallucinations 10/13/2022   • Chronic respiratory failure with hypoxia ([facility] [address] ) 10/13/2022      LOS (days): 2  Geometric Mean LOS (GMLOS) (days): 2 30  Days to GMLOS:0 7     OBJECTIVE:  Risk of Unplanned Readmission Score: 12 54         Current admission status: Inpatient   Preferred Pharmacy:   [facility] #[id number] [address] [place] [id number]  Phone: [phone] Fax: [phone]    Primary Care Provider: Pinky José DO    Primary Insurance: MEDICARE  Secondary Insurance: [employer]    DISCHARGE DETAILS:    Discharge planning discussed with[de-identified] Frank Gaitan (son) was called at 9:30 am  Freedom of Choice: Yes  Comments - Freedom of Choice: recommendation is for [facility]-  son was given the list of accepting agencies- he would like slna he had them in the past  CM contacted family/caregiver?: Yes             Contacts  Patient Contacts: Bobo Lambert  Relationship to Patient[de-identified] Family (son)  Contact Method: Phone  Phone Number: [phone]  Reason/Outcome: Discharge [address]         Is the patient interested in [facility] at discharge?: Yes         Other Referral/Resources/Interventions Provided:  Interventions: [facility]  Referral Comments: family and pt's choice raz- son woud like pt to go for rehab= I made him aware that is not the recommendation and the insurnance will not pay for rehab- [facility] [employer] [facility] and the pt does not qualify he make $55 to much- son would like therapy to reasses - he would like to talk to [facility] doctor about hospice care-  he did statee the pt does have a walker & cane at home and the pt does not use- pt does live with his son who is retired ,but he is not there 24/7

## 2023-02-19 NOTE — ASSESSMENT & PLAN NOTE
· Patient's son reports patient has been weaker recently and he's been trying to get PT ordered  · PT/OT recommending home with home care  · Follow-up with case management for discharge planning  · UA with no obvious signs of infection

## 2023-02-19 NOTE — ASSESSMENT & PLAN NOTE
Lab Results   Component Value Date    EGFR 65 02/19/2023    EGFR 58 02/18/2023    EGFR 56 02/17/2023    CREATININE 1 02 02/19/2023    CREATININE 1 12 02/18/2023    CREATININE 1 15 02/17/2023     · Creatinine appears to around baseline  · Continue to monitor with ongoing diuresis

## 2023-02-19 NOTE — PLAN OF CARE
Problem: PAIN - ADULT  Goal: Verbalizes/displays adequate comfort level or baseline comfort level  Description: Interventions:  - Encourage patient to monitor pain and request assistance  - Assess pain using appropriate pain scale  - Administer analgesics based on type and severity of pain and evaluate response  - Implement non-pharmacological measures as appropriate and evaluate response  - Consider cultural and social influences on pain and pain management  - Notify physician/advanced practitioner if interventions unsuccessful or patient reports new pain  Outcome: Progressing     Problem: SAFETY ADULT  Goal: Patient will remain free of falls  Description: INTERVENTIONS:  - Educate patient/family on patient safety including physical limitations  - Instruct patient to call for assistance with activity   - Consult OT/PT to assist with strengthening/mobility   - Keep Call bell within reach  - Keep bed low and locked with side rails adjusted as appropriate  - Keep care items and personal belongings within reach  - Initiate and maintain comfort rounds  - Make Fall Risk Sign visible to staff  - Offer Toileting every 2 Hours, in advance of need  - Initiate/Maintain bed alarm  - Apply yellow socks and bracelet for high fall risk patients  - Consider moving patient to room near nurses station  Outcome: Progressing  Goal: Maintain or return to baseline ADL function  Description: INTERVENTIONS:  -  Assess patient's ability to carry out ADLs; assess patient's baseline for ADL function and identify physical deficits which impact ability to perform ADLs (bathing, care of mouth/teeth, toileting, grooming, dressing, etc )  - Assess/evaluate cause of self-care deficits   - Assess range of motion  - Assess patient's mobility; develop plan if impaired  - Assess patient's need for assistive devices and provide as appropriate  - Encourage maximum independence but intervene and supervise when necessary  - Involve family in performance of ADLs  - Assess for home care needs following discharge   - Consider OT consult to assist with ADL evaluation and planning for discharge  - Provide patient education as appropriate  Outcome: Progressing  Goal: Maintains/Returns to pre admission functional level  Description: INTERVENTIONS:  - Perform BMAT or MOVE assessment daily    - Set and communicate daily mobility goal to care team and patient/family/caregiver  - Collaborate with rehabilitation services on mobility goals if consulted  - Perform Range of Motion 3 times a day  - Reposition patient every 2 hours  - Dangle patient 3 times a day  - Stand patient 3 times a day  - Ambulate patient 3 times a day  - Out of bed to chair 3 times a day   - Out of bed for meals 3 times a day  - Out of bed for toileting  - Record patient progress and toleration of activity level   Outcome: Progressing     Problem: DISCHARGE PLANNING  Goal: Discharge to home or other facility with appropriate resources  Description: INTERVENTIONS:  - Identify barriers to discharge w/patient and caregiver  - Arrange for needed discharge resources and transportation as appropriate  - Identify discharge learning needs (meds, wound care, etc )  - Arrange for interpretive services to assist at discharge as needed  - Refer to Case Management Department for coordinating discharge planning if the patient needs post-hospital services based on physician/advanced practitioner order or complex needs related to functional status, cognitive ability, or social support system  Outcome: Progressing     Problem: Knowledge Deficit  Goal: Patient/family/caregiver demonstrates understanding of disease process, treatment plan, medications, and discharge instructions  Description: Complete learning assessment and assess knowledge base    Interventions:  - Provide teaching at level of understanding  - Provide teaching via preferred learning methods  Outcome: Progressing     Problem: Prexisting or High Potential for Compromised Skin Integrity  Goal: Skin integrity is maintained or improved  Description: INTERVENTIONS:  - Identify patients at risk for skin breakdown  - Assess and monitor skin integrity  - Assess and monitor nutrition and hydration status  - Monitor labs   - Assess for incontinence   - Turn and reposition patient  - Assist with mobility/ambulation  - Relieve pressure over bony prominences  - Avoid friction and shearing  - Provide appropriate hygiene as needed including keeping skin clean and dry  - Evaluate need for skin moisturizer/barrier cream  - Collaborate with interdisciplinary team   - Patient/family teaching  - Consider wound care consult   Outcome: Progressing     Problem: MOBILITY - ADULT  Goal: Maintain or return to baseline ADL function  Description: INTERVENTIONS:  -  Assess patient's ability to carry out ADLs; assess patient's baseline for ADL function and identify physical deficits which impact ability to perform ADLs (bathing, care of mouth/teeth, toileting, grooming, dressing, etc )  - Assess/evaluate cause of self-care deficits   - Assess range of motion  - Assess patient's mobility; develop plan if impaired  - Assess patient's need for assistive devices and provide as appropriate  - Encourage maximum independence but intervene and supervise when necessary  - Involve family in performance of ADLs  - Assess for home care needs following discharge   - Consider OT consult to assist with ADL evaluation and planning for discharge  - Provide patient education as appropriate  Outcome: Progressing  Goal: Maintains/Returns to pre admission functional level  Description: INTERVENTIONS:  - Perform BMAT or MOVE assessment daily    - Set and communicate daily mobility goal to care team and patient/family/caregiver  - Collaborate with rehabilitation services on mobility goals if consulted  - Perform Range of Motion 3 times a day  - Reposition patient every 2 hours    - Dangle patient 3 times a day  - Stand patient 3 times a day  - Ambulate patient 3 times a day  - Out of bed to chair 3 times a day   - Out of bed for meals 3 times a day  - Out of bed for toileting  - Record patient progress and toleration of activity level   Outcome: Progressing

## 2023-02-19 NOTE — ASSESSMENT & PLAN NOTE
· Patient with suspected cellulitis on bilateral feet left greater than right  · We will continue ceftriaxone  · Transition to Keflex on discharge

## 2023-02-19 NOTE — PLAN OF CARE
Problem: OCCUPATIONAL THERAPY ADULT  Goal: Performs self-care activities at highest level of function for planned discharge setting  See evaluation for individualized goals  Description: Treatment Interventions: ADL retraining, Functional transfer training, UE strengthening/ROM, Endurance training, Cognitive reorientation, Patient/family training, Compensatory technique education, Energy conservation, Activityengagement    See flowsheet documentation for full assessment, interventions and recommendations  Outcome: Progressing  Note: Limitation: Decreased ADL status, Decreased UE ROM, Decreased UE strength, Decreased Safe judgement during ADL, Decreased endurance  Prognosis: Good  Assessment:   Patient participated in Skilled OT session this date with interventions consisting of ADL re training with the use of correct body mechnaics, Energy Conservation techniques and  therapeutic activities to: increase activity tolerance   Patient agreeable to OT treatment session, upon arrival patient was found supine in bed  In comparison to previous session, patient with improvements in bed mobility and functional mobility   Patient requiring verbal cues for safety  Patient continues to be functioning below baseline level, occupational performance remains limited secondary to factors listed above and increased risk for falls and injury  From OT standpoint, recommendation at time of d/c would be Home OT  Patient to benefit from continued Occupational Therapy treatment while in the hospital to address deficits as defined above and maximize level of functional independence with ADLs and functional mobility       OT Discharge Recommendation: Home with home health rehabilitation     Momo Morton MS, OTR/L

## 2023-02-19 NOTE — ASSESSMENT & PLAN NOTE
Wt Readings from Last 3 Encounters:   02/19/23 99 2 kg (218 lb 11 1 oz)   12/02/22 107 kg (236 lb 12 4 oz)   11/02/22 107 kg (235 lb 9 6 oz)     · Echo 10/14/22: EF 10-15%, severe global hypokinesis, mild aortic stenosis  · Patient with elevated BNP, vascular congestion on x-ray, lower extremity edema  · Patient clinically improving with IV diuresis    Will transition to oral Lasix tomorrow  · Continue Toprol, dose increased per cardiology  · Low salt diet  · Daily weights, I&O's  · Cardiology input appreciated

## 2023-02-19 NOTE — PROGRESS NOTES
[facility]  Progress Note Ron Yola 1935, 80 y o  male MRN: [id number]  Unit/Bed#: -01 Encounter: [id number]  Primary Care Provider: Kristina Caceres DO   Date and time admitted to hospital: 2/17/2023  4:42 PM    Acute on chronic systolic congestive heart failure (HCC)  Assessment & Plan  Wt Readings from Last 3 Encounters:   02/19/23 99 2 kg (218 lb 11 1 oz)   12/02/22 107 kg (236 lb 12 4 oz)   11/02/22 107 kg (235 lb 9 6 oz)     · Echo 10/14/22: EF 10-15%, severe global hypokinesis, mild aortic stenosis  · Patient with elevated BNP, vascular congestion on x-ray, lower extremity edema  · Patient clinically improving with IV diuresis    Will transition to oral Lasix tomorrow  · Continue Toprol, dose increased per cardiology  · Low salt diet  · Daily weights, I&O's  · Cardiology input appreciated    Atrial fibrillation with RVR ([address] )  Assessment & Plan  · Patient still with heart rate on the higher side  · Toprol dosing increased to 75 mg twice daily per cardiology  · Continue digoxin  · Eliquis for anticoagulation  · Cardiology following    Pleural effusion on right  Assessment & Plan  · We will continue diuresis as tolerated  · Patient is not requiring oxygen currently  · May benefit from pulmonology evaluation, to determine need for thoracentesis however not urgent given patient clinically improving with diuresis    Cellulitis of lower extremity  Assessment & Plan  · Patient with suspected cellulitis on bilateral feet left greater than right  · We will continue ceftriaxone  · Transition to Keflex on discharge    Ambulatory dysfunction  Assessment & Plan  · Patient's son reports patient has been weaker recently and he's been trying to get PT ordered  · PT/OT recommending home with home care  · Follow-up with case management for discharge planning  · UA with no obvious signs of infection    Chronic kidney disease, stage 3a [facility])  Assessment & Plan  Lab Results   Component Value Date    EGFR 65 2023    EGFR 58 2023    EGFR 56 2023    CREATININE 1 02 2023    CREATININE 1 12 2023    CREATININE 1 15 2023     · Creatinine appears to around baseline  · Continue to monitor with ongoing diuresis    * Syncope  Assessment & Plan  · Patient fell at home with concern for syncopal event per son  · CT head: negative for acute findings  · Echo 10/14/22: LVEF 10-15%, severe global hypokinesis, mild aortic stenosis  · Initial troponin 32, trended down  · Currently stable with no further episodes in the hospital  · Cardiology input appreciated    VTE Prophylaxis:  Apixaban (Eliquis)    Patient Centered Rounds: I have performed bedside rounds with nursing staff today  Discussions with Specialists or Other Care Team Provider: yes  Education and Discussions with Family / Patient: Spoke with patient's son regarding plan of care    Current Length of Stay: 2 day(s)    Current Patient Status: Inpatient   Certification Statement: The patient will continue to require additional inpatient hospital stay due to CHF exacerbation    Discharge Plan: PT/OT eval recommending home with home care  Son is concerned about patient being home by himself  Follow-up with case management tomorrow regarding discharge planning    Code Status: Level 3 - DNAR and DNI    Subjective:   Patient still with mild shortness of breath with exertion  Also noted to have erythema of lower extremity and started on antibiotics for suspected cellulitis  No pain complaints  Tolerating diet    Objective:     Vitals:   Temp (24hrs), Av 2 °F (36 2 °C), Min:96 7 °F (35 9 °C), Max:97 5 °F (36 4 °C)    Temp:  [96 7 °F (35 9 °C)-97 5 °F (36 4 °C)] 97 3 °F (36 3 °C)  HR:  [] 80  Resp:  [12-20] 17  BP: ()/(66-80) 106/66  SpO2:  [95 %-97 %] 97 %  Body mass index is 29 66 kg/m²  Input and Output Summary (last 24 hours):        Intake/Output Summary (Last 24 hours) at 2023 1329  Last data filed at 2/19/2023 1300  Gross per 24 hour   Intake 840 ml   Output 875 ml   Net -35 ml       Physical Exam:   Physical Exam  Constitutional:       General: He is not in acute distress  HENT:      Head: Normocephalic and atraumatic  Nose: Nose normal       Mouth/Throat:      Mouth: Mucous membranes are moist    Eyes:      Extraocular Movements: Extraocular movements intact  Conjunctiva/sclera: Conjunctivae normal    Cardiovascular:      Rate and Rhythm: Normal rate and regular rhythm  Pulmonary:      Effort: Pulmonary effort is normal  No respiratory distress  Comments: Decreased breath sounds right lung base  Abdominal:      Palpations: Abdomen is soft  Tenderness: There is no abdominal tenderness  Musculoskeletal:         General: Normal range of motion  Cervical back: Normal range of motion and neck supple  Comments: Trace bilateral lower extremity edema   Skin:     General: Skin is warm and dry  Findings: Erythema present  Comments: Erythema of bilateral feet left greater than right   Neurological:      General: No focal deficit present  Mental Status: He is alert  Mental status is at baseline  Cranial Nerves: No cranial nerve deficit  Psychiatric:         Mood and Affect: Mood normal          Behavior: Behavior normal          Additional Data:     Labs:    Results from last 7 days   Lab Units 02/18/23  1754 02/18/23  0544 02/17/23  1742   WBC Thousand/uL 6 05   < > 6 10   HEMOGLOBIN g/dL 15 2   < > 16 3   HEMATOCRIT % 47 3   < > 51 4*   PLATELETS Thousands/uL 187   < > 192   NEUTROS PCT %  --   --  70   LYMPHS PCT %  --   --  19   MONOS PCT %  --   --  9   EOS PCT %  --   --  2    < > = values in this interval not displayed       Results from last 7 days   Lab Units 02/19/23  0614 02/18/23  0544   SODIUM mmol/L 144 143   POTASSIUM mmol/L 3 4* 3 6   CHLORIDE mmol/L 103 100   CO2 mmol/L 37* 39*   BUN mg/dL 19 19   CREATININE mg/dL 1 02 1 12   CALCIUM mg/dL 8 6 9 1 ALK PHOS U/L  --  72   ALT U/L  --  17   AST U/L  --  30     Results from last 7 days   Lab Units 02/17/23  1742   INR  1 45*               * I Have Reviewed All Lab Data Listed Above  * Additional Pertinent Lab Tests Reviewed: [address] Admission  Reviewed    Imaging:  Imaging Reports Reviewed Today Include: No new imaging    Recent Cultures (last 7 days):           Last 24 Hours Medication List:   Current Facility-Administered Medications   Medication Dose Route Frequency Provider Last Rate   • acetaminophen  650 mg Oral Q4H PRN Whit Herbert PA-C     • apixaban  5 mg Oral BID Whit Herbert PA-C     • cefTRIAXone  1,000 mg Intravenous Q24H Jennifer Aragon MD 1,000 mg (02/19/23 1108)   • digoxin  125 mcg Oral Daily Whit Herbert PA-C     • furosemide  40 mg Intravenous Once Jennifer Aragon MD     • [START ON 2/20/2023] furosemide  40 mg Oral Daily Jennifer Aragon MD     • metoprolol succinate  50 mg Oral Once Zehra Trammell DO     • [START ON 2/20/2023] metoprolol succinate  75 mg Oral BID Zehra Chelseyli-Judy, DO     • ondansetron  4 mg Intravenous Q6H PRN Whit Herbert PA-C     • pantoprazole  40 mg Oral Daily Whit Herbert PA-C     • potassium chloride  40 mEq Oral Daily Jennifer Aragon MD     • pravastatin  80 mg Oral Daily With Dinner Whit Herbert PA-C     • sertraline  100 mg Oral Daily Whit Herbert PA-C          Today, Patient Was Seen By: Jennifer Aragon MD    ** Please Note: Dictation voice to text software may have been used in the creation of this document   **

## 2023-02-19 NOTE — ASSESSMENT & PLAN NOTE
· Patient still with heart rate on the higher side  · Toprol dosing increased to 75 mg twice daily per cardiology  · Continue digoxin  · Eliquis for anticoagulation  · Cardiology following

## 2023-02-19 NOTE — PLAN OF CARE
Problem: PAIN - ADULT  Goal: Verbalizes/displays adequate comfort level or baseline comfort level  Description: Interventions:  - Encourage patient to monitor pain and request assistance  - Assess pain using appropriate pain scale  - Administer analgesics based on type and severity of pain and evaluate response  - Implement non-pharmacological measures as appropriate and evaluate response  - Consider cultural and social influences on pain and pain management  - Notify physician/advanced practitioner if interventions unsuccessful or patient reports new pain  Outcome: Progressing     Problem: SAFETY ADULT  Goal: Patient will remain free of falls  Description: INTERVENTIONS:  - Educate patient/family on patient safety including physical limitations  - Instruct patient to call for assistance with activity   - Consult OT/PT to assist with strengthening/mobility   - Keep Call bell within reach  - Keep bed low and locked with side rails adjusted as appropriate  - Keep care items and personal belongings within reach  - Initiate and maintain comfort rounds  - Make Fall Risk Sign visible to staff  - Offer Toileting every 2 Hours, in advance of need  - Initiate/Maintain bed alarm  - Obtain necessary fall risk management equipment: alarm  - Apply yellow socks and bracelet for high fall risk patients  - Consider moving patient to room near nurses station  Outcome: Progressing

## 2023-02-19 NOTE — ASSESSMENT & PLAN NOTE
· Patient with suspected cellulitis on bilateral feet left greater than right  · Has been on ceftriaxone ; transition to doxycycline for 3 days  · Podiatry referral on discharge

## 2023-02-19 NOTE — PHYSICAL THERAPY NOTE
Physical Therapy Treatment Note       02/19/23 1007   PT Last Visit   PT Visit Date 02/19/23   Note Type   Note Type Treatment   Pain Assessment   Pain Assessment Tool 0-10   Pain Score No Pain   Restrictions/Precautions   Weight Bearing Precautions Per Order No   Other Precautions Chair Alarm; Fall Risk   General   Chart Reviewed Yes   Response to Previous Treatment Patient with no complaints from previous session  Family/Caregiver Present No   Cognition   Overall Cognitive Status WFL   Arousal/Participation Alert; Responsive; Cooperative   Attention Attends with cues to redirect   Orientation Level Oriented X4   Memory Decreased recall of precautions   Following Commands Follows one step commands without difficulty   Comments pt agreeable to PT session   Subjective   Subjective "I can try and walk some"   Bed Mobility   Supine to Sit 6  Modified independent   Additional items Assist x 1;HOB elevated   Additional Comments SpO2 on RA at EOB prior to mobility = 97%, HR = 98 bpm   Transfers   Sit to Stand 5  Supervision   Additional items Assist x 1; Increased time required  (momentum required for clearance of buttocks from bed)   Stand to Sit 5  Supervision   Additional items Assist x 1; Increased time required   Ambulation/Elevation   Gait pattern Antalgic; Forward Flexion;Decreased foot clearance; Short stride   Gait Assistance 5  Supervision   Additional items Assist x 1   Assistive Device Rolling walker   Distance 15 ft, 88 ft   Stair Management Assistance 5  Supervision   Additional items Assist x 1;Verbal cues; Increased time required  (VCs for sequencing up/down stairs)   Stair Management Technique Two rails; Step to pattern; Foreward   Number of Stairs 2   Balance   Static Sitting Good   Dynamic Sitting Fair +   Static Standing Fair   Dynamic Standing Fair -   Ambulatory Fair -   Endurance Deficit   Endurance Deficit Yes   Endurance Deficit Description +SOB/NIELSEN noted at end of amb trials, SpO2 remained > 91% Activity Tolerance   Activity Tolerance Patient limited by fatigue   Medical Staff Made Aware coordination of care provided with OT [address] [facility]; CM Kenay Macario   Nurse Made Aware Yes, RN Henry De Guzman made aware of recs/outcomes   Assessment   Prognosis Good   Problem List Decreased strength;Decreased endurance; Impaired balance;Decreased mobility; Decreased cognition; Impaired judgement;Decreased safety awareness   Assessment Pt seen for PT treatment session this date, consisting of ther act focused on bed mobility & functional transfer training, gt training on level surfaces to improve pt safety in household environment and elevation training for enter/exit home  Since previous session, pt has made good progress in terms of improved OOB mobility tolerance including amb x 88 ft/household distances with SUP level and use of RW  Pertinent barriers during this session include SOB  Current goals and POC remain appropriate, pt continues to have rehab potential and is making good progress towards STGs  Pt prognosis for achieving goals is good, pending pt progress with hospitalization/medical status improvements, and indicated by ability to follow directions, attention span, self-expression (thoughts, feelings, needs), motivation, supportive family/caregivers and previous response to intervention  PT recommends home with home health rehabilitation upon discharge  Pt continues to be functioning below baseline level, and remains limited 2* factors listed above  PT will continue to see pt during current hospitalization in order to address the deficits listed above and provide interventions consistent w/ POC in effort to achieve STGs  Barriers to Discharge None   Goals   Patient Goals "to get home"   STG Expiration Date 02/28/23   Short Term Goal #1 goals remain appropriate   PT Treatment Day 1   Plan   Treatment/Interventions LE strengthening/ROM; Functional transfer training;Elevations; Therapeutic exercise; Endurance training;Cognitive reorientation;Patient/family training;Equipment eval/education; Bed mobility;Gait training;Spoke to nursing;Spoke to case management   Progress Progressing toward goals   PT Frequency 3-5x/wk   Recommendation   PT Discharge Recommendation Home with home health rehabilitation   Equipment Recommended   (per pt, he has RW and SPC for home use)   Additional Comments Pt's raw score on the [facility] Basic Mobility inpatient short form is 22, standardized score is 47 4  Patients at this level are likely to benefit from DC to home with no services, however, please refer to therapist recommendation for safe DC planning  [facility] Basic Mobility Inpatient   Turning in Flat Bed Without Bedrails 4   Lying on Back to Sitting on Edge of Flat Bed Without Bedrails 4   Moving Bed to Chair 4   Standing Up From Chair Using Arms 4   Walk in Room 3   Climb 3-5 Stairs With Railing 3   Basic Mobility Inpatient Raw Score 22   Basic Mobility Standardized Score 47 4   Highest Level Of Mobility   J[facility]-HLM Goal 7: Walk 25 feet or more   J[facility]-HLM Achieved 7: Walk 25 feet or more   Education   Education Provided Mobility training;Assistive device   Patient Demonstrates verbal understanding;Reinforcement needed   End of Consult   Patient Position at End of Consult Bedside chair;Bed/Chair alarm activated; All needs within reach       Nasim Jaeger, PT, DPT    Time of PT treatment session: 0906-0222 (total treatment time = 15 minutes)

## 2023-02-19 NOTE — CONSULTS
Consultation - Cardiology  Ginger Quan 80 y o  male MRN: [id number]  Unit/Bed#: -01 Encounter: [id number]  Physician Requesting Consult: Apryl Duffy, *  Reason for Consult / Principal Problem: syncope, heart failure    Assessment/Plan   Syncope/fall  -details are unclear as was there a syncope or not or just a mechanical fall  From cardiac stand point no new arrhthymias on EKG or telemetry, no concern for ischemia based on labs and EKG  Can continue monitoring on telemetry and if no events, will consider Zio patch at discharge for conversion pauses also low probability given long standing persistent afib  Acute on chronic HFrEF/Bi-V dysfunction  -dilated LV, EF 10-15% on 10/14/2022  Ischemic and tachyarrhythmia mediated  -volume status: hypervolemic  Agree with IV lasix 40 mg bid for today and switch to home 40 mg qd from tomorrow    -hemodynamics: per outpt cardiology office and telephone notes, pt had episodes of hypotension thus unable to uptitrate GDMT  Will increase home metoprolol succ to 75 mg bid given tachycardia  -pt was scheduled for outpt TTE to reevaluate for LV function  will repeat TTE while admitted to evaluate for LVEF and further decide for need for primary prevention for sudden cardiac death such as AICD  Also if still low will discuss if pursuing rhythm control or biV-ICD will help with cardiomyopathy  If patient is discharged today, will do TTE as outpt  CAD/HTN/HDL/afib  -cont home digoxin (levels acceptable), eliquis 5 mg bid, pravastatin  Increased home metoprolol succ to 75 mg bid    HPI:History is per chart review as pt is unable to recall fall/syncope episode  Ginger Quan is a 80 y o  male with PMHx of long standing persistent atrial fibrillation, HFrEF(ischemic, tachyarrhythmia related, was 35-40% in 6/2015 and 10-15% dilated LV in 10/202), HTN, CAD ( pci to LAD), CVA, HLD, presented from home with fall/syncope   Trauma imaging was negative for acute findings but showed b/l pleural effusions R>L  Primary Cardiologist: Dr Eunice Singer: afib with HR in 80-100s with few episodes of RVR, few NSVTs,     Review of Systems  ROS as noted above, otherwise 12 point review of systems was performed and is negative  Historical Information   Past Medical History:   Diagnosis Date   • Atrial fibrillation ([facility] [address] )    • [facility]estive [facility]    • Hypertension      History reviewed  No pertinent surgical history    Social History     Substance and Sexual Activity   Alcohol Use Not Currently     Social History     Substance and Sexual Activity   Drug Use Never     Social History     Tobacco Use   Smoking Status Never   Smokeless Tobacco Never     Meds/Allergies   Hospital Medications:   Current Facility-Administered Medications   Medication Dose Route Frequency   • acetaminophen (TYLENOL) tablet 650 mg  650 mg Oral Q4H PRN   • apixaban (ELIQUIS) tablet 5 mg  5 mg Oral BID   • digoxin (LANOXIN) tablet 125 mcg  125 mcg Oral Daily   • [START ON 2/20/2023] furosemide (LASIX) tablet 40 mg  40 mg Oral Daily   • metoprolol succinate (TOPROL-XL) 24 hr tablet 100 mg  100 mg Oral Daily   • ondansetron (ZOFRAN) injection 4 mg  4 mg Intravenous Q6H PRN   • pantoprazole (PROTONIX) EC tablet 40 mg  40 mg Oral Daily   • potassium chloride (K-DUR,KLOR-CON) CR tablet 40 mEq  40 mEq Oral Daily   • pravastatin (PRAVACHOL) tablet 80 mg  80 mg Oral Daily With Dinner   • sertraline (ZOLOFT) tablet 100 mg  100 mg Oral Daily     Home Medications:   Medications Prior to Admission   Medication   • apixaban (ELIQUIS) 5 mg   • Cholecalciferol 50 MCG (2000 UT) CAPS   • digoxin (LANOXIN) 0 125 mg tablet   • furosemide (LASIX) 40 mg tablet   • metoprolol succinate (TOPROL-XL) 100 mg 24 hr tablet   • pantoprazole (PROTONIX) 40 mg tablet   • potassium chloride (Klor-Con) 10 mEq tablet   • sertraline (ZOLOFT) 50 mg tablet   • simvastatin (ZOCOR) 40 mg tablet     No Known Allergies    Objective   Vitals: Blood pressure 127/80, pulse (!) 122, temperature (!) 97 3 °F (36 3 °C), temperature source Oral, resp  rate 17, height 6' (1 829 m), weight 99 2 kg (218 lb 11 1 oz), SpO2 96 %  Orthostatic Blood Pressures    Flowsheet Row Most Recent Value   Blood Pressure 127/80 filed at 02/19/2023 0224   Patient Position - Orthostatic VS Lying filed at 02/19/2023 0748          Intake/Output Summary (Last 24 hours) at 2/19/2023 0919  Last data filed at 2/19/2023 0157  Gross per 24 hour   Intake 360 ml   Output 875 ml   Net -515 ml     Invasive Devices     Peripheral Intravenous Line  Duration           Peripheral IV 02/17/23 Proximal;Right;Ventral (anterior) Forearm 1 day              Physical Exam   GEN: NAD, oriented to place only, not time or person  HEENT: NCAT, PERRL, EOMs intact  NECK: No JVD or carotid bruits appreciated  CARDIOVASCULAR: irregular, normal S1, S2 without murmurs, rubs, or gallops appreciated  LUNGS: diminished lung sounds in bases R>L  ABDOMEN: Soft, nontender, nondistended  EXTREMITIES/VASCULAR: +1 b/l edema, L>R, warm  PSYCH: Normal mood and affect  NEURO: CN ll-Xll grossly intact    Lab Results: I have personally reviewed pertinent lab results  Results from last 7 days   Lab Units 02/18/23  1754 02/18/23  0544 02/17/23  1742   WBC Thousand/uL 6 05 7 01 6 10   HEMOGLOBIN g/dL 15 2 16 0 16 3   HEMATOCRIT % 47 3 50 3* 51 4*   PLATELETS Thousands/uL 187 197 192     Results from last 7 days   Lab Units 02/19/23  0614 02/18/23  0544 02/17/23  1742   POTASSIUM mmol/L 3 4* 3 6 3 7   CHLORIDE mmol/L 103 100 99   CO2 mmol/L 37* 39* 38*   BUN mg/dL 19 19 18   CREATININE mg/dL 1 02 1 12 1 15   CALCIUM mg/dL 8 6 9 1 8 9     Results from last 7 days   Lab Units 02/17/23  1742   INR  1 45*   PTT seconds 32     Results from last 7 days   Lab Units 02/19/23  0614 02/18/23  0544   MAGNESIUM mg/dL 2 1 1 6*     Imaging: I have personally reviewed pertinent reports

## 2023-02-19 NOTE — CASE MANAGEMENT
Case Management Progress Note    Patient name Belia Evangelista  Location [address] 205/-[id number] MRN [id number]  : 1935 Date 2023       LOS (days): 2  Geometric Mean LOS (GMLOS) (days): 2 30  Days to GMLOS:0 5        OBJECTIVE:        Current admission status: Inpatient  Preferred Pharmacy:   [id number] [address]  [address]  [facility] [address]  Phone: [phone] Fax: [phone]    Primary Care Provider: Edmond Heaton DO    Primary Insurance: MEDICARE  Secondary Insurance: [address] [place] [address],[facility]    PROGRESS NOTE:  Cm received a call back from the son at 11:13 am- pt was reassessed by therapy and c is the recommendation-son is aware insurance will nto pay for rehab at that level, he feels he needs rehab before coming home, I did state if he has money he could go to a [facility] for short time and receive therapy there- he did agree and asked if I could try to get him to [facility] or the [place], cm called the [facility] at 12:24pm  [phone] and I spoke with Malinda Bañuelos and he will reach outpt the family, information will need to be faxed to # [phone] and I called [facility] at 12:30pn # [phone] and spoke with Zahra Ventura and she stated I need to call back tomorrow and I need to talk wit Clara Ocasio

## 2023-02-20 ENCOUNTER — APPOINTMENT (INPATIENT)
Dept: NON INVASIVE DIAGNOSTICS | Facility: HOSPITAL | Age: 88
End: 2023-02-20

## 2023-02-20 VITALS
TEMPERATURE: 98 F | RESPIRATION RATE: 17 BRPM | SYSTOLIC BLOOD PRESSURE: 113 MMHG | HEIGHT: 72 IN | BODY MASS INDEX: 28.99 KG/M2 | OXYGEN SATURATION: 96 % | DIASTOLIC BLOOD PRESSURE: 72 MMHG | HEART RATE: 72 BPM | WEIGHT: 214 LBS

## 2023-02-20 LAB
ALBUMIN SERPL BCP-MCNC: 2.8 G/DL (ref 3.5–5)
ALP SERPL-CCNC: 66 U/L (ref 34–104)
ALT SERPL W P-5'-P-CCNC: 17 U/L (ref 7–52)
ANION GAP SERPL CALCULATED.3IONS-SCNC: 4 MMOL/L (ref 4–13)
AORTIC ROOT: 3.5 CM
AORTIC VALVE MEAN VELOCITY: 10.6 M/S
ASCENDING AORTA: 3.3 CM
AST SERPL W P-5'-P-CCNC: 31 U/L (ref 13–39)
AV AREA BY CONTINUOUS VTI: 1.7 CM2
AV AREA PEAK VELOCITY: 1.7 CM2
AV LVOT MEAN GRADIENT: 1 MMHG
AV LVOT PEAK GRADIENT: 3 MMHG
AV MEAN GRADIENT: 5 MMHG
AV PEAK GRADIENT: 10 MMHG
AV VALVE AREA: 1.74 CM2
AV VELOCITY RATIO: 0.55
BASOPHILS # BLD AUTO: 0.03 THOUSANDS/ÂΜL (ref 0–0.1)
BASOPHILS NFR BLD AUTO: 0 % (ref 0–1)
BILIRUB SERPL-MCNC: 1.7 MG/DL (ref 0.2–1)
BUN SERPL-MCNC: 21 MG/DL (ref 5–25)
CALCIUM ALBUM COR SERPL-MCNC: 10 MG/DL (ref 8.3–10.1)
CALCIUM SERPL-MCNC: 9 MG/DL (ref 8.4–10.2)
CHLORIDE SERPL-SCNC: 102 MMOL/L (ref 96–108)
CO2 SERPL-SCNC: 38 MMOL/L (ref 21–32)
CREAT SERPL-MCNC: 1.16 MG/DL (ref 0.6–1.3)
DOP CALC AO PEAK VEL: 1.54 M/S
DOP CALC AO VTI: 29.14 CM
DOP CALC LVOT AREA: 3.14 CM2
DOP CALC LVOT DIAMETER: 2 CM
DOP CALC LVOT PEAK VEL VTI: 16.16 CM
DOP CALC LVOT PEAK VEL: 0.84 M/S
DOP CALC LVOT STROKE INDEX: 23.7 ML/M2
DOP CALC LVOT STROKE VOLUME: 50.74 CM3
E WAVE DECELERATION TIME: 201 MS
EOSINOPHIL # BLD AUTO: 0.22 THOUSAND/ÂΜL (ref 0–0.61)
EOSINOPHIL NFR BLD AUTO: 3 % (ref 0–6)
ERYTHROCYTE [DISTWIDTH] IN BLOOD BY AUTOMATED COUNT: 15.8 % (ref 11.6–15.1)
FLUAV RNA RESP QL NAA+PROBE: NEGATIVE
FLUBV RNA RESP QL NAA+PROBE: NEGATIVE
FRACTIONAL SHORTENING: 5 % (ref 28–44)
GFR SERPL CREATININE-BSD FRML MDRD: 56 ML/MIN/1.73SQ M
GLUCOSE SERPL-MCNC: 82 MG/DL (ref 65–140)
HCT VFR BLD AUTO: 48 % (ref 36.5–49.3)
HGB BLD-MCNC: 15.2 G/DL (ref 12–17)
IMM GRANULOCYTES # BLD AUTO: 0.01 THOUSAND/UL (ref 0–0.2)
IMM GRANULOCYTES NFR BLD AUTO: 0 % (ref 0–2)
INTERVENTRICULAR SEPTUM IN DIASTOLE (PARASTERNAL SHORT AXIS VIEW): 1.1 CM
INTERVENTRICULAR SEPTUM: 1.1 CM (ref 0.6–1.1)
IVC: 26 MM
LAAS-AP2: 33.3 CM2
LAAS-AP4: 33.6 CM2
LEFT ATRIUM SIZE: 4.7 CM
LEFT INTERNAL DIMENSION IN SYSTOLE: 5.4 CM (ref 2.1–4)
LEFT VENTRICLE DIASTOLIC VOLUME (MOD BIPLANE): 178 ML
LEFT VENTRICLE SYSTOLIC VOLUME (MOD BIPLANE): 131 ML
LEFT VENTRICULAR INTERNAL DIMENSION IN DIASTOLE: 5.7 CM (ref 3.5–6)
LEFT VENTRICULAR POSTERIOR WALL IN END DIASTOLE: 1 CM
LEFT VENTRICULAR STROKE VOLUME: 15 ML
LV EF: 26 %
LVSV (TEICH): 15 ML
LYMPHOCYTES # BLD AUTO: 1.62 THOUSANDS/ÂΜL (ref 0.6–4.47)
LYMPHOCYTES NFR BLD AUTO: 24 % (ref 14–44)
MCH RBC QN AUTO: 32 PG (ref 26.8–34.3)
MCHC RBC AUTO-ENTMCNC: 31.7 G/DL (ref 31.4–37.4)
MCV RBC AUTO: 101 FL (ref 82–98)
MONOCYTES # BLD AUTO: 0.69 THOUSAND/ÂΜL (ref 0.17–1.22)
MONOCYTES NFR BLD AUTO: 10 % (ref 4–12)
MV PEAK A VEL: 0.01 M/S
MV PEAK E VEL: 62 CM/S
MV STENOSIS PRESSURE HALF TIME: 58 MS
MV VALVE AREA P 1/2 METHOD: 3.79 CM2
NEUTROPHILS # BLD AUTO: 4.22 THOUSANDS/ÂΜL (ref 1.85–7.62)
NEUTS SEG NFR BLD AUTO: 63 % (ref 43–75)
NRBC BLD AUTO-RTO: 0 /100 WBCS
PLATELET # BLD AUTO: 188 THOUSANDS/UL (ref 149–390)
PMV BLD AUTO: 10.1 FL (ref 8.9–12.7)
POTASSIUM SERPL-SCNC: 3.7 MMOL/L (ref 3.5–5.3)
PROT SERPL-MCNC: 5.9 G/DL (ref 6.4–8.4)
RA PRESSURE ESTIMATED: 10 MMHG
RBC # BLD AUTO: 4.75 MILLION/UL (ref 3.88–5.62)
RIGHT ATRIUM AREA SYSTOLE A4C: 31 CM2
RIGHT VENTRICLE ID DIMENSION: 4.2 CM
RSV RNA RESP QL NAA+PROBE: NEGATIVE
RV PSP: 60 MMHG
SARS-COV-2 RNA RESP QL NAA+PROBE: NEGATIVE
SL CV LEFT ATRIUM LENGTH A2C: 6.9 CM
SL CV LV EF: 15
SL CV PED ECHO LEFT VENTRICLE DIASTOLIC VOLUME (MOD BIPLANE) 2D: 157 ML
SL CV PED ECHO LEFT VENTRICLE SYSTOLIC VOLUME (MOD BIPLANE) 2D: 142 ML
SODIUM SERPL-SCNC: 144 MMOL/L (ref 135–147)
TR MAX PG: 50 MMHG
TR PEAK VELOCITY: 3.5 M/S
TRICUSPID ANNULAR PLANE SYSTOLIC EXCURSION: 1.3 CM
TRICUSPID VALVE PEAK REGURGITATION VELOCITY: 3.53 M/S
WBC # BLD AUTO: 6.79 THOUSAND/UL (ref 4.31–10.16)

## 2023-02-20 RX ORDER — DOXYCYCLINE HYCLATE 100 MG/1
100 CAPSULE ORAL EVERY 12 HOURS SCHEDULED
Qty: 5 CAPSULE | Refills: 0
Start: 2023-02-20 | End: 2023-02-23

## 2023-02-20 RX ORDER — METOPROLOL SUCCINATE 25 MG/1
75 TABLET, EXTENDED RELEASE ORAL 2 TIMES DAILY
Refills: 0
Start: 2023-02-20

## 2023-02-20 RX ORDER — DOXYCYCLINE HYCLATE 100 MG/1
100 CAPSULE ORAL EVERY 12 HOURS SCHEDULED
Status: DISCONTINUED | OUTPATIENT
Start: 2023-02-20 | End: 2023-02-20 | Stop reason: HOSPADM

## 2023-02-20 RX ADMIN — CEFTRIAXONE 1000 MG: 1 INJECTION, SOLUTION INTRAVENOUS at 09:16

## 2023-02-20 RX ADMIN — PANTOPRAZOLE SODIUM 40 MG: 40 TABLET, DELAYED RELEASE ORAL at 09:17

## 2023-02-20 RX ADMIN — POTASSIUM CHLORIDE 40 MEQ: 1500 TABLET, EXTENDED RELEASE ORAL at 09:17

## 2023-02-20 RX ADMIN — DOXYCYCLINE 100 MG: 100 CAPSULE ORAL at 11:00

## 2023-02-20 RX ADMIN — PERFLUTREN 1 ML/MIN: 6.52 INJECTION, SUSPENSION INTRAVENOUS at 08:50

## 2023-02-20 RX ADMIN — DIGOXIN 125 MCG: 250 TABLET ORAL at 09:17

## 2023-02-20 RX ADMIN — METOPROLOL SUCCINATE 75 MG: 50 TABLET, EXTENDED RELEASE ORAL at 09:17

## 2023-02-20 RX ADMIN — FUROSEMIDE 40 MG: 40 TABLET ORAL at 09:17

## 2023-02-20 RX ADMIN — APIXABAN 5 MG: 5 TABLET, FILM COATED ORAL at 09:17

## 2023-02-20 RX ADMIN — SERTRALINE HYDROCHLORIDE 100 MG: 50 TABLET ORAL at 09:17

## 2023-02-20 NOTE — WOUND OSTOMY CARE
Consult Note - Wound   Floridalma Harris 80 y o  male MRN: [id number]  Unit/Bed#: -01 Encounter: [id number]        History and Present Illness:  80year old male ,fall at home, son present  Seen today for initial wound consult  Pt states he sees podiatry for nail care  Recommend follow up for toe wounds  PMH: Syncope,DANIEL,cellulitis of lower extremity  Ambulatory dysfunction  Assessment Findings:   1)Bilateral heels buttocks and sacrum intact  2)Traumatic wound to plantar aspect of first toe of right foot and anterior of 3rd toe, details and photo below  Wound is dry intact     No induration, fluctuance, odor, warmth/temperature differences, redness, or purulence noted to the above noted wounds and skin areas assessed  New dressings applied per orders listed below  Patient tolerated well- no s/s of non-verbal pain or discomfort observed during the encounter  Bedside nurse aware of plan of care  See flow sheets for more detailed assessment findings  Wound care will continue to follow  Skin care plans:  1-Hydraguard to bilateral sacrum, buttock and heels BID and PRN  2-Elevate heels to offload pressure  3-Ehob cushion in chair when out of bed  4-Moisturize skin daily with skin nourishing cream   5-Turn/reposition q2h or when medically stable for pressure re-distribution on skin  6-Cleanse both feet with soap and water pat dry  Apply Betadine to first and third toe abrasion daily   Wounds:  Wound 02/18/23 Traumatic Laceration Toe (Comment  which one) Right; Anterior;Plantar (Active)   Wound Image   02/20/23 0923   Wound Description Dry; Intact; Brown 02/20/23 0923   Olga-wound Assessment Dry; Intact;Callus 02/20/23 0923   Wound Length (cm) 1 8 cm 02/20/23 0923   Wound Width (cm) 0 5 cm 02/20/23 0923   Wound Depth (cm) 0 3 cm 02/20/23 0923   Wound Surface Area (cm^2) 0 9 cm^2 02/20/23 0923   Wound Volume (cm^3) 0 27 cm^3 02/20/23 0923   Calculated Wound Volume (cm^3) 0 27 cm^3 02/20/23 0923   Drainage Amount None 02/20/23 0923   Non-staged Wound Description Full thickness 02/20/23 0923   Treatments Site care;Elevated;Cleansed 02/20/23 0923   Dressing Open to air; Other (Comment) 02/20/23 1100   Dressing Changed New 02/20/23 0923   Patient Tolerance Tolerated well 02/20/23 0923   Dressing Status Remoistened 02/20/23 0923       Wound 02/18/23 Traumatic Laceration Toe (Comment  which one) Anterior;Right (Active)   Wound Image   02/20/23 0922   Wound Description Intact;Dry;Beefy red;Brown 02/20/23 0922   Olga-wound Assessment Intact;Dry;Callus 02/20/23 0922   Wound Length (cm) 0 9 cm 02/20/23 0922   Wound Width (cm) 0 7 cm 02/20/23 0922   Wound Surface Area (cm^2) 0 63 cm^2 02/20/23 0922   Drainage Amount None 02/20/23 0922   Treatments Cleansed;Site care;Elevated 02/20/23 0922   Dressing Open to air; Other (Comment) 02/20/23 1100   Dressing Changed New 02/20/23 0922   Patient Tolerance Tolerated well 02/20/23 0922   Dressing Status Remoistened 02/20/23 6087     Recommend follow up with Podiatry upon discharge     Call or tigertext with any questions  Wound Care will continue to follow    Santiago HOLLOWAYN RN

## 2023-02-20 NOTE — PLAN OF CARE
Problem: PAIN - ADULT  Goal: Verbalizes/displays adequate comfort level or baseline comfort level  Description: Interventions:  - Encourage patient to monitor pain and request assistance  - Assess pain using appropriate pain scale  - Administer analgesics based on type and severity of pain and evaluate response  - Implement non-pharmacological measures as appropriate and evaluate response  - Consider cultural and social influences on pain and pain management  - Notify physician/advanced practitioner if interventions unsuccessful or patient reports new pain  Outcome: Adequate for Discharge     Problem: SAFETY ADULT  Goal: Patient will remain free of falls  Description: INTERVENTIONS:  - Educate patient/family on patient safety including physical limitations  - Instruct patient to call for assistance with activity   - Consult OT/PT to assist with strengthening/mobility   - Keep Call bell within reach  - Keep bed low and locked with side rails adjusted as appropriate  - Keep care items and personal belongings within reach  - Initiate and maintain comfort rounds  - Make Fall Risk Sign visible to staff  - Offer Toileting every 2 Hours, in advance of need  - Initiate/Maintain bed alarm  - Obtain necessary fall risk management equipment: alarms  - Apply yellow socks and bracelet for high fall risk patients  - Consider moving patient to room near nurses station  2/20/2023 1519 by Aurea Lindsay RN  Outcome: Adequate for Discharge  2/20/2023 1243 by Aurea Lindsay RN  Outcome: Progressing  Goal: Maintain or return to baseline ADL function  Description: INTERVENTIONS:  -  Assess patient's ability to carry out ADLs; assess patient's baseline for ADL function and identify physical deficits which impact ability to perform ADLs (bathing, care of mouth/teeth, toileting, grooming, dressing, etc )  - Assess/evaluate cause of self-care deficits   - Assess range of motion  - Assess patient's mobility; develop plan if impaired  - Assess patient's need for assistive devices and provide as appropriate  - Encourage maximum independence but intervene and supervise when necessary  - Involve family in performance of ADLs  - Assess for home care needs following discharge   - Consider OT consult to assist with ADL evaluation and planning for discharge  - Provide patient education as appropriate  2/20/2023 1519 by Tressa Coleman RN  Outcome: Adequate for Discharge  2/20/2023 1243 by Tressa Coleman RN  Outcome: Progressing  Goal: Maintains/Returns to pre admission functional level  Description: INTERVENTIONS:  - Perform BMAT or MOVE assessment daily    - Set and communicate daily mobility goal to care team and patient/family/caregiver  - Collaborate with rehabilitation services on mobility goals if consulted  - Perform Range of Motion 3 times a day  - Reposition patient every 2 hours    - Dangle patient 3 times a day  - Stand patient 3 times a day  - Ambulate patient 3 times a day  - Out of bed to chair 3 times a day   - Out of bed for meals 3 times a day  - Out of bed for toileting  - Record patient progress and toleration of activity level   Outcome: Adequate for Discharge     Problem: DISCHARGE PLANNING  Goal: Discharge to home or other facility with appropriate resources  Description: INTERVENTIONS:  - Identify barriers to discharge w/patient and caregiver  - Arrange for needed discharge resources and transportation as appropriate  - Identify discharge learning needs (meds, wound care, etc )  - Refer to Case Management Department for coordinating discharge planning if the patient needs post-hospital services based on physician/advanced practitioner order or complex needs related to functional status, cognitive ability, or social support system  Outcome: Adequate for Discharge     Problem: Knowledge Deficit  Goal: Patient/family/caregiver demonstrates understanding of disease process, treatment plan, medications, and discharge instructions  Description: Complete learning assessment and assess knowledge base    Interventions:  - Provide teaching at level of understanding  - Provide teaching via preferred learning methods  Outcome: Adequate for Discharge     Problem: Prexisting or High Potential for Compromised Skin Integrity  Goal: Skin integrity is maintained or improved  Description: INTERVENTIONS:  - Identify patients at risk for skin breakdown  - Assess and monitor skin integrity  - Assess and monitor nutrition and hydration status  - Monitor labs   - Assess for incontinence   - Turn and reposition patient  - Assist with mobility/ambulation  - Relieve pressure over bony prominences  - Avoid friction and shearing  - Provide appropriate hygiene as needed including keeping skin clean and dry  - Evaluate need for skin moisturizer/barrier cream  - Collaborate with interdisciplinary team   - Patient/family teaching  - Consider wound care consult   Outcome: Adequate for Discharge     Problem: MOBILITY - ADULT  Goal: Maintain or return to baseline ADL function  Description: INTERVENTIONS:  -  Assess patient's ability to carry out ADLs; assess patient's baseline for ADL function and identify physical deficits which impact ability to perform ADLs (bathing, care of mouth/teeth, toileting, grooming, dressing, etc )  - Assess/evaluate cause of self-care deficits   - Assess range of motion  - Assess patient's mobility; develop plan if impaired  - Assess patient's need for assistive devices and provide as appropriate  - Encourage maximum independence but intervene and supervise when necessary  - Involve family in performance of ADLs  - Assess for home care needs following discharge   - Consider OT consult to assist with ADL evaluation and planning for discharge  - Provide patient education as appropriate  2/20/2023 1519 by Maralyn Nageotte, RN  Outcome: Adequate for Discharge  2/20/2023 1243 by Maralyn Nageotte, RN  Outcome: Progressing  Goal: Maintains/Returns to pre admission functional level  Description: INTERVENTIONS:  - Perform BMAT or MOVE assessment daily    - Set and communicate daily mobility goal to care team and patient/family/caregiver  - Collaborate with rehabilitation services on mobility goals if consulted  - Perform Range of Motion 3 times a day  - Reposition patient every 2 hours    - Dangle patient 3 times a day  - Stand patient 3 times a day  - Ambulate patient 3 times a day  - Out of bed to chair 3 times a day   - Out of bed for meals 3 times a day  - Out of bed for toileting  - Record patient progress and toleration of activity level   Outcome: Adequate for Discharge

## 2023-02-20 NOTE — CASE MANAGEMENT
Case Management Discharge Planning Note    Patient name Joel Ramirez /-10 MRN [id number]  : 1935 Date 2023       Current Admission Date: 2023  Current Admission Diagnosis:Syncope   Patient Active Problem List    Diagnosis Date Noted   • Cellulitis of lower extremity 2023   • Pleural effusion on right 2023   • Syncope 2023   • Gastroesophageal reflux disease without esophagitis 2022   • Anxiety and depression 2022   • Ambulatory dysfunction 2022   • Generalized weakness 2022   • Chronic kidney disease, stage 3a ([facility] [address] ) 10/25/2022   • Goals of care, counseling/discussion 10/17/2022   • Bacteremia 10/16/2022   • DANIEL (acute kidney injury) ([facility] [address] ) 10/14/2022   • Atrial fibrillation with RVR ([facility] [address] ) 10/13/2022   • Acute on chronic systolic congestive heart failure ([facility] [address] ) 10/13/2022   • CAD (coronary artery disease), native coronary artery 10/13/2022   • History of CVA (cerebrovascular accident) 10/13/2022   • Benign essential HTN 10/13/2022   • Hallucinations 10/13/2022   • Chronic respiratory failure with hypoxia ([facility] [address] ) 10/13/2022      LOS (days): 3  Geometric Mean LOS (GMLOS) (days): 3 90  Days to GMLOS:1 3     OBJECTIVE:  Risk of Unplanned Readmission Score: 12 73         Current admission status: Inpatient   [id number] [address]  [address] [place] [address]  Phone: [phone] Fax: [phone]    Primary Care Provider: Senait Hodge DO    Primary Insurance: MEDICARE  Secondary Insurance: [facility]    DISCHARGE DETAILS:    Discharge planning discussed with[de-identified] Jaci Schwab  was called at 9:22 am   cm called  Trinity Samson at the [facility] at 9:26am  - he stated he did receive my referral- he will call ne back at 12 pm     Comments - Freedom of Choice: recommendation is for [facility]- [facility] referrals were sent- son feels the pt needs to go somewhere fro rehab  CM contacted family/caregiver?: Yes             Contacts  Patient Contacts: Harish Tavares  Relationship to Patient[de-identified] Family (son)  Contact Method: Phone  Phone Number: [phone]  Reason/Outcome: Discharge Planning              Other Referral/Resources/Interventions Provided:  Interventions: Assisted Living  Referral Comments: son does not wish fro me to call [facility] today- cm called yesterday and I was told to call back today- he did talk with Carlos Ambriz at the [facility] for the pt to go there for respite care and he would have [facility] rehab at the facility- he received the referral and he is reviewing - he stated he would call me back at 12 pm to discuss possibel admission and he is not sure he can come today    Would you like to participate in our [address] service program?  : No - Declined    Treatment Team Recommendation: Assisted Living ([facility]- family)                                   IMM Given (Date):: 02/20/23  Imm was reviewed over the phone at 9:22 am  dopy was given to the pt when he came to see his father at 2pm  Family notified[de-identified] son was called

## 2023-02-20 NOTE — PLAN OF CARE
Problem: SAFETY ADULT  Goal: Patient will remain free of falls  Description: INTERVENTIONS:  - Educate patient/family on patient safety including physical limitations  - Instruct patient to call for assistance with activity   - Consult OT/PT to assist with strengthening/mobility   - Keep Call bell within reach  - Keep bed low and locked with side rails adjusted as appropriate  - Keep care items and personal belongings within reach  - Initiate and maintain comfort rounds  - Make Fall Risk Sign visible to staff  - Offer Toileting every 2 Hours, in advance of need  - Initiate/Maintain bed alarm  - Obtain necessary fall risk management equipment: alarm  - Apply yellow socks and bracelet for high fall risk patients  - Consider moving patient to room near nurses station  Outcome: Progressing  Goal: Maintain or return to baseline ADL function  Description: INTERVENTIONS:  -  Assess patient's ability to carry out ADLs; assess patient's baseline for ADL function and identify physical deficits which impact ability to perform ADLs (bathing, care of mouth/teeth, toileting, grooming, dressing, etc )  - Assess/evaluate cause of self-care deficits   - Assess range of motion  - Assess patient's mobility; develop plan if impaired  - Assess patient's need for assistive devices and provide as appropriate  - Encourage maximum independence but intervene and supervise when necessary  - Involve family in performance of ADLs  - Assess for home care needs following discharge   - Consider OT consult to assist with ADL evaluation and planning for discharge  - Provide patient education as appropriate  Outcome: Progressing     Problem: MOBILITY - ADULT  Goal: Maintain or return to baseline ADL function  Description: INTERVENTIONS:  -  Assess patient's ability to carry out ADLs; assess patient's baseline for ADL function and identify physical deficits which impact ability to perform ADLs (bathing, care of mouth/teeth, toileting, grooming, dressing, etc )  - Assess/evaluate cause of self-care deficits   - Assess range of motion  - Assess patient's mobility; develop plan if impaired  - Assess patient's need for assistive devices and provide as appropriate  - Encourage maximum independence but intervene and supervise when necessary  - Involve family in performance of ADLs  - Assess for home care needs following discharge   - Consider OT consult to assist with ADL evaluation and planning for discharge  - Provide patient education as appropriate  Outcome: Progressing

## 2023-02-20 NOTE — CASE MANAGEMENT
Case Management Discharge Planning Note    Patient name Maddie Gates  Location /-69 MRN [id number]  : 1935 Date 2023       Current Admission Date: 2023  Current Admission Diagnosis:Syncope   Patient Active Problem List    Diagnosis Date Noted   • Cellulitis of lower extremity 2023   • Pleural effusion on right 2023   • Syncope 2023   • Gastroesophageal reflux disease without esophagitis 2022   • Anxiety and depression 2022   • Ambulatory dysfunction 2022   • Generalized weakness 2022   • Chronic kidney disease, stage 3a ([facility] [address] ) 10/25/2022   • Goals of care, counseling/discussion 10/17/2022   • Bacteremia 10/16/2022   • DANIEL (acute kidney injury) ([facility] [address] ) 10/14/2022   • Atrial fibrillation with RVR ([facility] [address] ) 10/13/2022   • Acute on chronic systolic congestive heart failure ([facility] [address] ) 10/13/2022   • CAD (coronary artery disease), native coronary artery 10/13/2022   • History of CVA (cerebrovascular accident) 10/13/2022   • Benign essential HTN 10/13/2022   • Hallucinations 10/13/2022   • Chronic respiratory failure with hypoxia ([facility] [address] ) 10/13/2022      LOS (days): 3  Geometric Mean LOS (GMLOS) (days): 3 90  Days to GMLOS:1 2     OBJECTIVE:  Risk of Unplanned Readmission Score: 12 73         Current admission status: Inpatient   Preferred Pharmacy:   [id number] [address]   [place] [address]  Phone: [phone] Fax: [phone]    Primary Care Provider: Werner Horn DO    Primary Insurance: MEDICARE  Secondary Insurance: [facility]    DISCHARGE DETAILS:    Discharge planning discussed with[de-identified] cm received a call from Annamaria Brewster- he stated that I need to send referrals for snf  Freedom of Choice: Yes  Comments - Freedom of Choice: recommendation is for hhc- family is requesting snf reha - they were made aware  that they may need to pay- recommendation is for hhc  CM contacted family/caregiver?: Yes             Contacts  Patient Contacts: [facility]  Relationship to Patient[de-identified] Family  Contact Method: Phone  Phone Number: [phone]  Reason/Outcome: Discharge Planning         DME Referral Provided  Referral made for DME?: No    Other Referral/Resources/Interventions Provided:  Interventions: Short Term Rehab  Referral Comments: cm was given permission to place a blanket referral- cm did place a call to [facility] they would like him to go there- son is aware pt is stable for d/c and he will need to go to the 1st avaible bed under medicare rule- referrals have been sent    Would you like to participate in our [address] service program?  : No - Declined    Treatment Team Recommendation: Assisted Living (pch- family)          IMM reviewed with patient and caregiver, patient and caregiver agrees with discharge determination       pt and son are in agreement with the d/c and d/c plan                       IMM Given (Date):: 02/20/23  IMM Given to[de-identified] Family (son was called IMM was reviwed and he stated he understood- IMM was mailed)  Family notified[de-identified] son was called

## 2023-02-20 NOTE — ASSESSMENT & PLAN NOTE
· Heart rates improved  · Toprol dosing increased to 75 mg twice daily per cardiology  · Eliquis for anticoagulation  · Cardiology following

## 2023-02-20 NOTE — ASSESSMENT & PLAN NOTE
Clinically resolved  There is severe underlying cardiomyopathy  Based on patient's frail status and prior discussions I would not recommend ICD type treatment

## 2023-02-20 NOTE — ASSESSMENT & PLAN NOTE
Lab Results   Component Value Date    EGFR 56 02/20/2023    EGFR 65 02/19/2023    EGFR 58 02/18/2023    CREATININE 1 16 02/20/2023    CREATININE 1 02 02/19/2023    CREATININE 1 12 02/18/2023     · Creatinine appears to around baseline  · Continue to monitor with ongoing diuresis

## 2023-02-20 NOTE — DISCHARGE SUMMARY
[address]  Discharge- Ciaran File 1935, 80 y o  male MRN: [id number]  Unit/Bed#: -01 Encounter: [id number]  Primary Care Provider: Sommer Robert DO   Date and time admitted to hospital: 2/17/2023  4:42 PM    * Acute on chronic systolic congestive heart failure (HCC)  Assessment & Plan  Wt Readings from Last 3 Encounters:   02/20/23 97 1 kg (214 lb)   12/02/22 107 kg (236 lb 12 4 oz)   11/02/22 107 kg (235 lb 9 6 oz)     · Echo 10/14/22: EF 10-15%, severe global hypokinesis, mild aortic stenosis  · Patient with elevated BNP, vascular congestion on x-ray, lower extremity edema  · Patient clinically improving with IV diuresis ; has been transitioned to oral diuresis  · Continue Toprol, dose increased per cardiology  · Low salt diet  · Daily weights, I&O's  · Cardiology input appreciated    Atrial fibrillation with RVR ([address] )  Assessment & Plan  · Heart rates improved  · Toprol dosing increased to 75 mg twice daily per cardiology  · Eliquis for anticoagulation  · Cardiology following    Ambulatory dysfunction  Assessment & Plan  · Patient's son reports patient has been weaker recently and he's been trying to get PT ordered  · PT/OT recommending home with home care  · Follow-up with case management for discharge planning  · UA with no obvious signs of infection  · Feel as though patient may need at least short-term rehab    Pleural effusion on right  Assessment & Plan  · We will continue diuresis as tolerated  · Patient is not requiring oxygen currently    Cellulitis of lower extremity  Assessment & Plan  · Patient with suspected cellulitis on bilateral feet left greater than right  · Has been on ceftriaxone ; transition to doxycycline for 3 days  · Podiatry referral on discharge    Syncope  Assessment & Plan  · Patient fell at home with concern for syncopal event per son  · CT head: negative for acute findings  · Echo 10/14/22: LVEF 10-15%, severe global hypokinesis, mild aortic stenosis  · Initial troponin 32, trended down  · Currently stable with no further episodes in the hospital  · Cardiology input appreciated    Chronic kidney disease, stage 3a [facility])  Assessment & Plan  Lab Results   Component Value Date    EGFR 56 02/20/2023    EGFR 65 02/19/2023    EGFR 58 02/18/2023    CREATININE 1 16 02/20/2023    CREATININE 1 02 02/19/2023    CREATININE 1 12 02/18/2023     · Creatinine appears to around baseline  · Continue to monitor with ongoing diuresis      Discharging Physician / Practitioner: Ana Maria Wright DO  PCP: Dion Crook DO  Admission Date:   Admission Orders (From admission, onward)     Ordered        02/17/23 1845  INPATIENT ADMISSION  Once                      Discharge Date: 02/20/23    Medical Problems     Resolved Problems  Date Reviewed: 2/20/2023   None         Consultations During Hospital Stay: Cardiology    Procedures Performed: Not applicable    Significant Findings / Test Results:     XR Trauma chest portable    Result Date: 2/18/2023  Impression: Development of mild CHF, right sided effusion and possible right basal infiltrate Workstation performed: [id number]     TRAUMA - CT head wo contrast    Result Date: 2/17/2023  Impression: No acute intracranial pathology  Chronic ischemic changes  Workstation performed: [id number]     TRAUMA - CT spine cervical wo contrast    Result Date: 2/17/2023  Impression: No cervical spine fracture or traumatic malalignment  Partially imaged large right pleural effusion  Smaller left pleural effusion  Effusions have increased compared with chest x-ray from 12/1/2022  Workstation performed: [id number]       Incidental Findings: Not applicable    Test Results Pending at Discharge (will require follow up):  Not applicable     Outpatient Tests Requested: Not applicable    Reason for Admission: [facility] Course:     Wang Kumar is a 80 y o  male with a PMH of cardiomyopathy with EF of 10-15%, CKD, Afib who presents after a fall at home  The patient states he was walking in the kitchen when he felt his knees give out and fell backward hitting his head off the counter  He states he was lying on the floor for a minute or two when his son came in and found him on the floor  The patient denies any dizziness or lightheadedness prior to the fall  He denies loss of consciousness  The patient's son states he was wake when he found him  He states he has a video camera in the kitchen and watched the video  He states his father was up walking around and was seen at the refrigerator and he fell  He state it looked as if his father may have had a syncopal episode  He states he was unable to get his father up off the floor and called EMS  CT head negative  CT C spine without without acute findings  Troponin of 32  The patient denies any chest pain, shortness of breath, lightheadedness/dizziness, abdominal pain, nausea/vomiting, or diarrhea  He denies fevers/chills  The patient underwent diuresis for likely congestive heart failure exacerbation  His beta-blocker dosage was increased due to tachycardia  Family states that they would like the patient to be discharged to a personal care facility  Case management arranged for such  He has remained hemodynamically stable and saturating well on room air throughout his hospital course  He was strongly encouraged to resume all preadmission medications at all preadmission dosages with the exception of an increase in his beta-blocker  He was discharged in stable condition on 2/20/2023 to personal care facility  Condition at Discharge: stable     Discharge Day Visit / Exam:     Subjective:  Patient seen and examined at bedside  No acute events overnight  Denies chest pain, SOB, diaphoresis, nausea/vomiting/diarrhea, fevers/chills       Vitals: Blood Pressure: 113/72 (02/20/23 0805)  Pulse: 72 (02/20/23 0805)  Temperature: 98 °F (36 7 °C) (02/19/23 2300)  Temp Source: Axillary (02/19/23 2300)  Respirations: 17 (02/19/23 1512)  Height: 6' (182 9 cm) (02/20/23 0805)  Weight - Scale: 97 1 kg (214 lb) (02/20/23 0805)  SpO2: 96 % (02/19/23 1512)     Exam:   Physical Exam  Vitals and nursing note reviewed  Constitutional:       General: He is not in acute distress  Appearance: He is well-developed  HENT:      Head: Normocephalic and atraumatic  Eyes:      Conjunctiva/sclera: Conjunctivae normal    Cardiovascular:      Rate and Rhythm: Normal rate and regular rhythm  Heart sounds: No murmur heard  Pulmonary:      Effort: Pulmonary effort is normal  No respiratory distress  Breath sounds: Normal breath sounds  Abdominal:      Palpations: Abdomen is soft  Tenderness: There is no abdominal tenderness  Musculoskeletal:         General: No swelling  Cervical back: Neck supple  Skin:     General: Skin is warm and dry  Capillary Refill: Capillary refill takes less than 2 seconds  Neurological:      Mental Status: He is alert  Psychiatric:         Mood and Affect: Mood normal        Discharge instructions/Information to patient and family:   See after visit summary for information provided to patient and family  Provisions for Follow-Up Care:  See after visit summary for information related to follow-up care and any pertinent home health orders  Disposition:     [place]  Increase dose of beta-blocker  Resume all other preadmission medications  Outpatient follow-up with PCP     Discharge Statement:  I spent 60 minutes discharging the patient  This time was spent on the day of discharge  I had direct contact with the patient on the day of discharge  Greater than 50% of the total time was spent examining patient, answering all patient questions, arranging and discussing plan of care with patient as well as directly providing post-discharge instructions  Additional time then spent on discharge activities      Discharge Medications:  See after visit summary for reconciled discharge medications provided to patient and family        ** Please Note: This note has been constructed using a voice recognition system **

## 2023-02-20 NOTE — DISCHARGE INSTR - OTHER ORDERS
Skin care plans:  1-Hydraguard to bilateral sacrum, buttock and heels BID and PRN  2-Elevate heels to offload pressure  3-Ehob cushion in chair when out of bed  4-Moisturize skin daily with skin nourishing cream   5-Turn/reposition q2h or when medically stable for pressure re-distribution on skin  6-Cleanse both feet with soap and water pat dry  Apply Betadine to first and third toe abrasion daily     Follow up with Podiatry upon discharge

## 2023-02-20 NOTE — PROGRESS NOTES
[address]  Progress Note Makeda Mullins 1935, 80 y o  male MRN: [id number]  Unit/Bed#: -01 Encounter: [id number]  Primary Care Provider: Delmy Sloan DO   Date and time admitted to hospital: 2/17/2023  4:42 PM    Ambulatory dysfunction  Assessment & Plan  At least short-term rehab being considered and this seems proper  Acute on chronic systolic congestive heart failure ([address] )  Assessment & Plan  Clinically resolved  There is severe underlying cardiomyopathy  Based on patient's frail status and prior discussions I would not recommend ICD type treatment  Atrial fibrillation with RVR (HCC)  Assessment & Plan  Heart rate now well controlled  On higher beta-blocker dose than on admission  As such to simplify things and prevent further problems I am going to discontinue digoxin  Eliquis remains appropriate as long as there are no uncontrolled falls  Outpatient Cardiologist: Paty Stone  Patient has a long history of atrial fibrillation as well as prior stroke in 2015 and prior LAD stent  Subjective:   Patient seen and examined  No significant events overnight  He feels back to his baseline  Discussion very difficult as he is hard of hearing  Summary comments:  Fortunately despite his frail condition and severe cardiomyopathy all seems okay today and back to baseline  Telemetry/ECG/Cardiac testing:   TTE 10/14/2022:  LVEF 10-15%  Diminished RV contractility as well  TTE 2/20/2023: Essentially no change  Vitals: Blood pressure 113/72, pulse 72, temperature 98 °F (36 7 °C), temperature source Axillary, resp   rate 17, height 6' (1 829 m), weight 97 1 kg (214 lb), SpO2 96 % ,   Orthostatic Blood Pressures    Flowsheet Row Most Recent Value   Blood Pressure 113/72 filed at 02/20/2023 0805   Patient Position - Orthostatic VS Sitting filed at 02/19/2023 1512      ,   Weight (last 2 days)     Date/Time Weight    02/20/23 0805 97 1 (214)    02/20/23 0600 97 2 (214 29)    02/19/23 0600 99 2 (218 7)     Weight: weight x 2 1 sheet 1 blanket 1 pillow  SCD machine and  o2 tank was off  nurse made aware  at 02/19/23 0600    02/19/23 0500 99 2 (218 7)    02/18/23 13:22:38 106 (233 91)          Physical Exam:      General:  Normal appearance in no distress  Eyes:  Anicteric  Oral mucosa:  Moist   Neck:  No JV D  Carotid upstrokes are brisk without bruits  No masses  Chest:  Clear to auscultation and percussion  Cardiac:  Irregularly irregular  No palpable PMI  Normal S1 and S2  No murmur gallop or rub  Abdomen:  Soft and nontender  No palpable organomegaly or aortic enlargement  Extremities:  No peripheral edema  Neuro:  Grossly symmetric  Psych:  Alert and oriented x3        Medications:      Current Facility-Administered Medications:   •  acetaminophen (TYLENOL) tablet 650 mg, 650 mg, Oral, Q4H PRN, Vijaya Bolaños PA-C  •  apixaban (ELIQUIS) tablet 5 mg, 5 mg, Oral, BID, Kan Johnson PA-C, 5 mg at 02/20/23 5629  •  cefTRIAXone (ROCEPHIN) IVPB (premix in dextrose) 1,000 mg 50 mL, 1,000 mg, Intravenous, Q24H, Denise Andrew MD, Last Rate: 100 mL/hr at 02/20/23 0916, 1,000 mg at 02/20/23 0596  •  furosemide (LASIX) tablet 40 mg, 40 mg, Oral, Daily, Denise Andrew MD, 40 mg at 02/20/23 6794  •  metoprolol succinate (TOPROL-XL) 24 hr tablet 75 mg, 75 mg, Oral, BID, Zehra Trammell DO, 75 mg at 02/20/23 0917  •  ondansetron (ZOFRAN) injection 4 mg, 4 mg, Intravenous, Q6H PRN, Vijaya Bolaños PA-C  •  pantoprazole (PROTONIX) EC tablet 40 mg, 40 mg, Oral, Daily, Kan Johnson PA-C, 40 mg at 02/20/23 7828  •  potassium chloride (K-DUR,KLOR-CON) CR tablet 40 mEq, 40 mEq, Oral, Daily, Denise Andrew MD, 40 mEq at 02/20/23 2606  •  pravastatin (PRAVACHOL) tablet 80 mg, 80 mg, Oral, Daily With Marie Raphael PA-C, 80 mg at 02/19/23 1608  •  sertraline (ZOLOFT) tablet 100 mg, 100 mg, Oral, Daily, Kan Johnson PA-C, 100 mg at 02/20/23 0917     Labs & Results:    Troponins:    Results from last 7 days   Lab Units 02/17/23  2238 02/17/23  1950 02/17/23  1742   HS TNI 0HR ng/L  --   --  32   HS TNI 2HR ng/L  --  30  --    HSTNI D2 ng/L  --  -2  --    HS TNI 4HR ng/L 28  --   --    HSTNI D4 ng/L -4  --   --         BNP:   Results from last 6 Months   Lab Units 02/17/23  1742 12/01/22  1315   BNP pg/mL 1,373* 1,531*     CBC with diff:   Results from last 7 days   Lab Units 02/20/23  0517 02/18/23  1754   WBC Thousand/uL 6 79 6 05   HEMOGLOBIN g/dL 15 2 15 2   HEMATOCRIT % 48 0 47 3   MCV fL 101* 100*   PLATELETS Thousands/uL 188 187     TSH:     CMP:   Results from last 7 days   Lab Units 02/20/23  0517 02/19/23  0614 02/18/23  0544   POTASSIUM mmol/L 3 7 3 4* 3 6   CHLORIDE mmol/L 102 103 100   CO2 mmol/L 38* 37* 39*   BUN mg/dL 21 19 19   CREATININE mg/dL 1 16 1 02 1 12   AST U/L 31  --  30   ALT U/L 17  --  17   EGFR ml/min/1 73sq m 56 65 58     Lipid Profile:     Coags:   Results from last 7 days   Lab Units 02/17/23  1742   INR  1 45*

## 2023-02-20 NOTE — ASSESSMENT & PLAN NOTE
· Patient's son reports patient has been weaker recently and he's been trying to get PT ordered  · PT/OT recommending home with home care  · Follow-up with case management for discharge planning  · UA with no obvious signs of infection  · Feel as though patient may need at least short-term rehab

## 2023-02-20 NOTE — ASSESSMENT & PLAN NOTE
Wt Readings from Last 3 Encounters:   02/20/23 97 1 kg (214 lb)   12/02/22 107 kg (236 lb 12 4 oz)   11/02/22 107 kg (235 lb 9 6 oz)     · Echo 10/14/22: EF 10-15%, severe global hypokinesis, mild aortic stenosis  · Patient with elevated BNP, vascular congestion on x-ray, lower extremity edema  · Patient clinically improving with IV diuresis ; has been transitioned to oral diuresis  · Continue Toprol, dose increased per cardiology  · Low salt diet  · Daily weights, I&O's  · Cardiology input appreciated

## 2023-02-20 NOTE — ASSESSMENT & PLAN NOTE
Heart rate now well controlled  On higher beta-blocker dose than on admission  As such to simplify things and prevent further problems I am going to discontinue digoxin  Eliquis remains appropriate as long as there are no uncontrolled falls

## 2023-02-20 NOTE — CASE MANAGEMENT
Case Management Discharge Planning Note    Patient name Darlene Mabry  Location /-17 MRN [id number]  : 1935 Date 2023       Current Admission Date: 2023  Current Admission Diagnosis:Acute on chronic systolic congestive heart failure [facility])   Patient Active Problem List    Diagnosis Date Noted   • Cellulitis of lower extremity 2023   • Pleural effusion on right 2023   • Syncope 2023   • Gastroesophageal reflux disease without esophagitis 2022   • Anxiety and depression 2022   • Ambulatory dysfunction 2022   • Generalized weakness 2022   • Chronic kidney disease, stage 3a ([facility] [address] ) 10/25/2022   • Goals of care, counseling/discussion 10/17/2022   • Bacteremia 10/16/2022   • DANIEL (acute kidney injury) ([facility] [address] ) 10/14/2022   • Atrial fibrillation with RVR ([facility] [address] ) 10/13/2022   • Acute on chronic systolic congestive heart failure ([facility] [address] ) 10/13/2022   • CAD (coronary artery disease), native coronary artery 10/13/2022   • History of CVA (cerebrovascular accident) 10/13/2022   • Benign essential HTN 10/13/2022   • Hallucinations 10/13/2022   • Chronic respiratory failure with hypoxia ([facility] [address] ) 10/13/2022      LOS (days): 3  Geometric Mean LOS (GMLOS) (days): 3 90  Days to GMLOS:1 1     OBJECTIVE:  Risk of Unplanned Readmission Score: 12 62         Current admission status: Inpatient   Preferred Pharmacy:   [facility] [address] [facility] [address]   [facility] [address]  Phone: [phone] Fax: [phone]    Primary Care Provider: Flaca Loyola DO    Primary Insurance: MEDICARE  Secondary Insurance: [facility]    DISCHARGE DETAILS:    Discharge planning discussed with[de-identified] laurien and  son at [facility] bedside  Freedom of Choice: Yes  Comments - Freedom of Choice: son was made aware of accepting facilities and he was accepted to [facility] family and pt's choice  CM contacted family/caregiver?: Yes  Were Treatment Team discharge recommendations reviewed with patient/caregiver?: Yes  Did patient/caregiver verbalize understanding of patient care needs?: Yes  Were patient/caregiver advised of the risks associated with not following Treatment Team discharge recommendations?: Yes    Contacts  Patient Contacts: Benny Carrillo  Relationship to Patient[de-identified] Family  Contact Method:  In Person, Phone  Phone Number: [phone]  Reason/Outcome: Discharge [address]         Is the patient interested in [facility] at discharge?: No         Other Referral/Resources/Interventions Provided:  Interventions: Short Term Rehab  Referral Comments: pt accepted to [facility]- son will transport - Marge Barlwo stated the pt needs to come after 3pm and he needs  neg covid test- rn was given the fax and report #-    Would you like to participate in our [facility] [address] service program?  : No - Declined    Treatment Team Recommendation: Short Term Rehab ([facility]-family)  Discharge Destination Plan[de-identified] Short Term Rehab ([facility]- family)  Transport at Discharge : Automobile, Family         pt and family are in agreement with the d/c and d/c plan                                  Accepting Facility Name, [facility] : [address] nurs/rehab  Receiving Facility/Agency Phone Number: [phone]  Facility/Agency Fax Number: [phone]

## 2023-02-20 NOTE — PROGRESS NOTES
Luis Enrique 45  Progress Note [place] 1935, 80 y o  male MRN: [id number]  Unit/Bed#: -01 Encounter: [id number]  Primary Care Provider: Asha Braun DO   Date and time admitted to hospital: 2/17/2023  4:42 PM    * Acute on chronic systolic congestive heart failure (HCC)  Assessment & Plan  Wt Readings from Last 3 Encounters:   02/20/23 97 1 kg (214 lb)   12/02/22 107 kg (236 lb 12 4 oz)   11/02/22 107 kg (235 lb 9 6 oz)     · Echo 10/14/22: EF 10-15%, severe global hypokinesis, mild aortic stenosis  · Patient with elevated BNP, vascular congestion on x-ray, lower extremity edema  · Patient clinically improving with IV diuresis ; has been transitioned to oral diuresis  · Continue Toprol, dose increased per cardiology  · Low salt diet  · Daily weights, I&O's  · Cardiology input appreciated    Atrial fibrillation with RVR ([address] )  Assessment & Plan  · Heart rates improved  · Toprol dosing increased to 75 mg twice daily per cardiology  · Continue digoxin  · Eliquis for anticoagulation  · Cardiology following    Ambulatory dysfunction  Assessment & Plan  · Patient's son reports patient has been weaker recently and he's been trying to get PT ordered  · PT/OT recommending home with home care  · Follow-up with case management for discharge planning  · UA with no obvious signs of infection  · Feel as though patient may need at least short-term rehab    Pleural effusion on right  Assessment & Plan  · We will continue diuresis as tolerated  · Patient is not requiring oxygen currently    Cellulitis of lower extremity  Assessment & Plan  · Patient with suspected cellulitis on bilateral feet left greater than right  · Has been on ceftriaxone ; transition to doxycycline for 3 days  · Podiatry referral on discharge    Syncope  Assessment & Plan  · Patient fell at home with concern for syncopal event per son  · CT head: negative for acute findings  · Echo 10/14/22: LVEF 10-15%, severe global hypokinesis, mild aortic stenosis  · Initial troponin 32, trended down  · Currently stable with no further episodes in the hospital  · Cardiology input appreciated    Chronic kidney disease, stage 3a [facility])  Assessment & Plan  Lab Results   Component Value Date    EGFR 56 2023    EGFR 65 2023    EGFR 58 2023    CREATININE 1 16 2023    CREATININE 1 02 2023    CREATININE 1 12 2023     · Creatinine appears to around baseline  · Continue to monitor with ongoing diuresis    VTE Pharmacologic Prophylaxis: Eliquis    Patient Centered Rounds:  Patient care rounds were performed with nursing    Discussions with Specialists or Other Care Team Provider: Cardiology    Education and Discussions with Family / Patient: Spoke with patient's son    Time Spent for Care: 30  More than 50% of total time spent on counseling and coordination of care as described above  Current Length of Stay: 3 day(s)    Current Patient Status: Inpatient     Certification Statement: The patient will continue to require additional inpatient hospital stay due to placement to either home with home health care vs short-term rehab    Discharge Plan: Pending PT/OT reevaluation and treatment    Code Status: Level 3 - DNAR and DNI      Subjective:   Patient seen and examined at bedside  No acute events overnight  Denies chest pain, SOB, diaphoresis, nausea/vomiting/diarrhea, fevers/chills  Objective:     Vitals:   Temp (24hrs), Av 5 °F (36 4 °C), Min:97 2 °F (36 2 °C), Max:98 °F (36 7 °C)    Temp:  [97 2 °F (36 2 °C)-98 °F (36 7 °C)] 97 2 °F (36 2 °C)  HR:  [60-80] 72  Resp:  [17-20] 20  BP: (106-136)/(62-86) 113/72  SpO2:  [94 %-97 %] 94 %  Body mass index is 29 02 kg/m²  Input and Output Summary (last 24 hours):        Intake/Output Summary (Last 24 hours) at 2023 1029  Last data filed at 2023 0418  Gross per 24 hour   Intake 580 ml   Output 600 ml   Net -20 ml       Physical Exam:     Physical Exam  Vitals and nursing note reviewed  Constitutional:       General: He is not in acute distress  Appearance: He is well-developed  HENT:      Head: Normocephalic and atraumatic  Eyes:      Conjunctiva/sclera: Conjunctivae normal    Cardiovascular:      Rate and Rhythm: Normal rate and regular rhythm  Heart sounds: No murmur heard  Pulmonary:      Effort: Pulmonary effort is normal  No respiratory distress  Breath sounds: Normal breath sounds  Abdominal:      Palpations: Abdomen is soft  Tenderness: There is no abdominal tenderness  Musculoskeletal:         General: No swelling  Cervical back: Neck supple  Skin:     General: Skin is warm and dry  Capillary Refill: Capillary refill takes less than 2 seconds  Neurological:      Mental Status: He is alert  Psychiatric:         Mood and Affect: Mood normal          Additional Data:     Labs:  I have reviewed pertinent results     Results from last 7 days   Lab Units 02/20/23  0517   WBC Thousand/uL 6 79   HEMOGLOBIN g/dL 15 2   HEMATOCRIT % 48 0   PLATELETS Thousands/uL 188   NEUTROS PCT % 63   LYMPHS PCT % 24   MONOS PCT % 10   EOS PCT % 3     Results from last 7 days   Lab Units 02/20/23  0517   SODIUM mmol/L 144   POTASSIUM mmol/L 3 7   CHLORIDE mmol/L 102   CO2 mmol/L 38*   BUN mg/dL 21   CREATININE mg/dL 1 16   ANION GAP mmol/L 4   CALCIUM mg/dL 9 0   ALBUMIN g/dL 2 8*   TOTAL BILIRUBIN mg/dL 1 70*   ALK PHOS U/L 66   ALT U/L 17   AST U/L 31   GLUCOSE RANDOM mg/dL 82     Results from last 7 days   Lab Units 02/17/23  1742   INR  1 45*                     Imaging: I have reviewed pertinent imaging       Recent Cultures (last 7 days):           Last 24 Hours Medication List:   Current Facility-Administered Medications   Medication Dose Route Frequency Provider Last Rate   • acetaminophen  650 mg Oral Q4H PRN Cora Erwin PA-C     • apixaban  5 mg Oral BID Cora Erwin PA-C     • doxycycline hyclate  100 mg Oral Q12H LISHA Jeronimo Ortiz DO     • furosemide  40 mg Oral Daily Sara Robles MD     • metoprolol succinate  75 mg Oral BID Zehra Trammell DO     • ondansetron  4 mg Intravenous Q6H PRN Cora Erwin PA-C     • pantoprazole  40 mg Oral Daily Cora Erwin PA-C     • potassium chloride  40 mEq Oral Daily Sara Robles MD     • pravastatin  80 mg Oral Daily With Dinner Cora Erwin PA-C     • sertraline  100 mg Oral Daily Cora Erwin PA-C          Today, Patient Was Seen By: Jeronimo Ortiz DO    ** Please Note: Dictation voice to text software may have been used in the creation of this document   **

## 2023-02-20 NOTE — OCCUPATIONAL THERAPY NOTE
02/20/23 1103   OT Last Visit   OT Visit Date 02/20/23   Note Type   Note Type Cancelled Session   Cancel Reasons   (patient adamantly stated "I'm not doing that" when offered UE exercise>he had already done self-cares this morning  *He did then follow my suggestion to put his feet onto bed (did so Indep  ly) for safety and comfort : was seated at EOB on my approach )       SINAI Monk/DEE

## 2023-03-16 PROBLEM — N18.30 STAGE 3 CHRONIC KIDNEY DISEASE (HCC): Status: ACTIVE | Noted: 2022-01-01

## 2023-03-16 PROBLEM — I26.99 PULMONARY EMBOLISM (HCC): Status: ACTIVE | Noted: 2023-01-01

## 2023-03-16 PROBLEM — N18.31 CHRONIC KIDNEY DISEASE, STAGE 3A (HCC): Status: RESOLVED | Noted: 2022-01-01 | Resolved: 2023-01-01

## 2023-03-16 PROBLEM — G93.40 ENCEPHALOPATHY: Status: ACTIVE | Noted: 2023-01-01

## 2023-03-16 PROBLEM — N18.31 ACUTE RENAL FAILURE SUPERIMPOSED ON STAGE 3A CHRONIC KIDNEY DISEASE (HCC): Status: ACTIVE | Noted: 2022-01-01

## 2023-03-16 NOTE — CONSULTS
e-Consult ([facility])   Belia Evangelista 80 y o  male MRN: [id number]  Unit/Bed#: ED 24 Encounter: [id number]      Reason for Consult / Principal Problem: Pulmonary embolism despite therapeutic Eliquis    Consults  03/16/23      ASSESSMENT:  Patient is an 49-year-old male with past medical history of A-fib on Eliquis, history of CVA, CHF, CAD, CKD, hypertension and GERD  He was brought into the emergency department today by the skilled nursing facility today after he was found to have significant dyspnea and hypoxia  Is reported that his O2 sats were in the 80s and he was noted to be somewhat confused  On work-up was found to have DANIEL, PE and pleural effusion possibly CHF related  PE Study with CT Abdomen and Pelvis with contrast  IMPRESSION:  No large central pulmonary embolism seen     Nonocclusive the segmental pulmonary embolism in the right lower lobe segmental pulmonary artery branch, image 152 series 2  Measured RV/LV ratio is within normal limits at less than 0 9  Moderate to large right effusion  Moderate left effusion with some presacral edema and anterior abdominal wall edema, evaluate for heart failure     Cholelithiasis     Enhancing area seen within the liver parenchyma, incompletely characterized probably due to vascular shunt however nonemergent evaluation with the MRI suggested for definite extension to exclude any underlying lesion      Incidentally noted is a 1 5 cm cystic pancreatic lesion which can also be evaluated at the time of the MRI      RECOMMENDATIONS:  1: Pulmonary embolism: Patient was found to have nonocclusive right lower lobe segmental pulmonary embolism despite therapeutic Eliquis rendering failure  He is currently on heparin drip  We will check beta-2 glycoprotein antibodies and anticardiolipin antibodies to rule out APS since he also has history of thrombolic CVA  Additional hypercoagulable work-up could be pursued as an outpatient      Recommendations for further anticoagulation once patient is ready for discharge could be transitioning back to Coumadin (noted per family was on it at 1 point unknown reason why he was switched)  If his kidney function improves/creatinine clearance remains above 30 other options include low molecular weight heparin or Pradaxa  2: Pancreatic cyst: Imaging revealed 1 5 cm cystic pancreatic lesion with enhancing liver parenchyma  Would recommend pursuing follow up abdominal MRI (ordered) to rule out malignant process as the cause of his thrombosis/hypercoagulable state  We will continue to monitor patient remotely  Please do not hesitate to contact us should you have any further questions or concerns  21-30 minutes, >50% of the total time devoted to medical consultative verbal/EMR discussion between providers  Written report will be generated in the EMR      JACOB Li

## 2023-03-16 NOTE — ASSESSMENT & PLAN NOTE
· Nursing staff at [facility] reported the patient to be confused and not his self  · Patient currently appears to be at baseline per the patient's family at the bedside however they do state the patient has been declining over the last few months    · CT head negative

## 2023-03-16 NOTE — ASSESSMENT & PLAN NOTE
Wt Readings from Last 3 Encounters:   02/20/23 97 1 kg (214 lb)   12/02/22 107 kg (236 lb 12 4 oz)   11/02/22 107 kg (235 lb 9 6 oz)     · Patient presents from [facility] with reports of hypoxia and shortness of breath  · CT chest PE study: Moderate to large right effusion  Moderate left effusion with some presacral edema and anterior abdominal wall edema, evaluate for heart failure  · Patient received 40 mg of IV Lasix, patient's blood pressure dropped into the 80s after dose of Lasix  Blood pressure has since improved into the 90s  · We will hold off on additional IV Lasix and discussed with nephrology for possible continuous Lasix or Bumex infusion     · Strict I's and O's, daily weights  · Echo in the setting of pulmonary embolism  · Cardiology consult

## 2023-03-16 NOTE — ED NOTES
Meds given ,pt on upright position w/ apple sauce and water w/ thick and easy  Pt tolerated well  Argenis Dougherty, [address] [facility]  03/16/23 9153

## 2023-03-16 NOTE — ASSESSMENT & PLAN NOTE
· Heart rate slightly elevated in the low 100s  · Continue home regimen of Toprol with hold parameters given low normal blood pressures  · Cardiology consulted

## 2023-03-16 NOTE — ASSESSMENT & PLAN NOTE
· Patient with history of afib on eliquis  Family reports patient was previously on Coumadin but was switched to Eliquis and they are not sure why  · CTA chest PE study: Nonocclusive the segmental pulmonary embolism in the right lower lobe segmental pulmonary artery branch, image 152 series 2 Measured RV/LV ratio is within normal limits at less than 0 9      · Possible eliquis failure   · Patient started on heparin gtt  · Hematology consult

## 2023-03-16 NOTE — H&P
[address]  H&P- Corey Barillas 1935, 80 y o  male MRN: [id number]  Unit/Bed#: ED 24 Encounter: [id number]  Primary Care Provider: Delmy Sloan DO   Date and time admitted to hospital: 3/16/2023  9:04 AM    * Acute on chronic systolic congestive heart failure ([address] )  Assessment & Plan  Wt Readings from Last 3 Encounters:   02/20/23 97 1 kg (214 lb)   12/02/22 107 kg (236 lb 12 4 oz)   11/02/22 107 kg (235 lb 9 6 oz)     · Patient presents from [facility] with reports of hypoxia and shortness of breath  · CT chest PE study: Moderate to large right effusion  Moderate left effusion with some presacral edema and anterior abdominal wall edema, evaluate for heart failure  · Patient received 40 mg of IV Lasix, patient's blood pressure dropped into the 80s after dose of Lasix  Blood pressure has since improved into the 90s  · We will hold off on additional IV Lasix and discussed with nephrology for possible continuous Lasix or Bumex infusion  · Strict I's and O's, daily weights  · Echo in the setting of pulmonary embolism  · Cardiology consult          Pulmonary embolism [facility])  Assessment & Plan  · Patient with history of afib on eliquis  Family reports patient was previously on Coumadin but was switched to Eliquis and they are not sure why  · CTA chest PE study: Nonocclusive the segmental pulmonary embolism in the right lower lobe segmental pulmonary artery branch, image 152 series 2 Measured RV/LV ratio is within normal limits at less than 0 9      · Possible eliquis failure   · Patient started on heparin gtt  · Hematology consult    Acute renal failure superimposed on stage 3a chronic kidney disease [facility])  Assessment & Plan  Lab Results   Component Value Date    EGFR 33 03/16/2023    EGFR 56 02/20/2023    EGFR 65 02/19/2023    CREATININE 1 77 (H) 03/16/2023    CREATININE 1 16 02/20/2023    CREATININE 1 02 02/19/2023     · Creatinine of 1 77 on admission, this is above patient's baseline  · Possibly cardiorenal   · We will need to monitor creatinine closely while diuresing  · Avoid nephrotoxins and hypotension  · Nephrology consult    Atrial fibrillation with RVR (HCC)  Assessment & Plan  · Heart rate slightly elevated in the low 100s  · Continue home regimen of Toprol with hold parameters given low normal blood pressures  · Cardiology consulted    Ambulatory dysfunction  Assessment & Plan  · Patient currently residing at [facility] skilled nursing facility for short-term rehab after a fall  · PT/OT    Encephalopathy  Assessment & Plan  · Nursing staff at [facility] reported the patient to be confused and not his self  · Patient currently appears to be at baseline per the patient's family at the bedside however they do state the patient has been declining over the last few months  · CT head negative    VTE Pharmacologic Prophylaxis: VTE Score: 5 High Risk (Score >/= 5) - Pharmacological DVT Prophylaxis Ordered: heparin drip  Sequential Compression Devices Ordered  Code Status: Level 3 - DNAR and DNI   Discussion with family: Updated  (son) at bedside  Anticipated Length of Stay: Patient will be admitted on an inpatient basis with an anticipated length of stay of greater than 2 midnights secondary to chf, pulmonary embolism, DANIEL  Total Time Spent on Date of Encounter in care of patient: 65 minutes This time was spent on one or more of the following: performing physical exam; counseling and coordination of care; obtaining or reviewing history; documenting in the medical record; reviewing/ordering tests, medications or procedures; communicating with other healthcare professionals and discussing with patient's family/caregivers  Chief Complaint: Hypoxia, shortness of breath    History of Present Illness:  Char Jensen is a 80 y o  male with a PMH of A-fib, CHF, CKD 3 who presents from [facility] SN[facility] due to hypoxia and shortness of breath  According to the nursing home staff the patient was noted to have a O2 sat in the 80s this morning and was noted to be short of breath which prompted them to call EMS  They also reported the patient to be confused  On arrival to the emergency department the patient had a nonrebreather on and was then transitioned to 6 L of nasal cannula  The patient wears 2 L of O2 at night chronically but none during the day  CTA chest showed nonocclusive segmental pulmonary embolism in the right lower lobe  Moderate to large right pleural effusion moderate left effusion with some presacral edema and anterior abdominal wall edema  In the emergency department the patient is noted to be in A-fib with a heart rate in the low 100s  The patient himself is sleeping but easily aroused  He denies any complaints  Review of Systems:  Review of Systems   Respiratory: Positive for shortness of breath  All other systems reviewed and are negative  Past Medical and Surgical History:   Past Medical History:   Diagnosis Date   • Atrial fibrillation ([facility] )    • CHF (congestive heart failure) ([facility] )    • Hypertension        No past surgical history on file  Meds/Allergies:  Prior to Admission medications    Medication Sig Start Date End Date Taking? Authorizing Provider   apixaban (ELIQUIS) 5 mg Take 1 tablet (5 mg total) by mouth 2 (two) times a day 11/2/22 12/2/22  Moe Montana DO   furosemide (LASIX) 40 mg tablet Take 1 tablet (40 mg total) by mouth daily 11/14/22 12/14/22  Moe Montana DO   metoprolol succinate (TOPROL-XL) 25 mg 24 hr tablet Take 3 tablets (75 mg total) by mouth 2 (two) times a day 2/20/23   Rosi Alvarez, DO   pantoprazole (PROTONIX) 40 mg tablet Take 40 mg by mouth daily    Historical Provider, MD   potassium chloride (Klor-Con) 10 mEq tablet Take 10 mEq by mouth 2 (two) times a day   Indications: Low Amount of Potassium in the Blood 10/18/22   JACOB Gustafson   sertraline (ZOLOFT) 50 mg tablet Take 100 mg by mouth daily 7/5/11   Historical Provider, MD   simvastatin (ZOCOR) 40 mg tablet Take 40 mg by mouth 4/19/11   Historical Provider, MD     I have reviewed home medications using recent Epic encounter  Allergies: No Known Allergies    Social History:  Marital Status: /Civil Union   Occupation: Retired  Patient Pre-hospital Living Situation: [facility]  Patient Pre-hospital Level of Mobility: unable to be assessed at time of evaluation  Patient Pre-hospital Diet Restrictions: Mechanical soft with honey thick liquids  Substance Use History:   Social History     Substance and Sexual Activity   Alcohol Use Not Currently     Social History     Tobacco Use   Smoking Status Never   Smokeless Tobacco Never     Social History     Substance and Sexual Activity   Drug Use Never       Family History:  No family history on file  Physical Exam:     Vitals:   Blood Pressure: 94/78 (03/16/23 1315)  Pulse: (!) 107 (03/16/23 1315)  Temperature: 97 5 °F (36 4 °C) (03/16/23 0926)  Respirations: 16 (03/16/23 1315)  Height: 6' (182 9 cm) (03/16/23 0926)  SpO2: 98 % (03/16/23 1315)    Physical Exam  Vitals and nursing note reviewed  Constitutional:       Appearance: He is morbidly obese  Interventions: Nasal cannula in place  HENT:      Head: Normocephalic and atraumatic  Cardiovascular:      Rate and Rhythm: Tachycardia present  Rhythm irregularly irregular  Pulmonary:      Effort: Pulmonary effort is normal       Breath sounds: Decreased air movement present  Abdominal:      Palpations: Abdomen is soft  Tenderness: There is no abdominal tenderness  Musculoskeletal:      Right lower leg: Edema present  Left lower leg: Edema present  Skin:     General: Skin is warm and dry  Neurological:      General: No focal deficit present  Mental Status: He is alert and easily aroused  Mental status is at baseline  Comments:  The patient is alert and oriented to person and place  He is unable to give me the year  Patient is at his baseline per the family at the bedside  Psychiatric:         Mood and Affect: Mood normal          Speech: Speech normal          Behavior: Behavior is cooperative            Additional Data:     Lab Results:  Results from last 7 days   Lab Units 03/16/23  0928   WBC Thousand/uL 6 43   HEMOGLOBIN g/dL 16 4   HEMATOCRIT % 52 2*   PLATELETS Thousands/uL 180   NEUTROS PCT % 66   LYMPHS PCT % 21   MONOS PCT % 11   EOS PCT % 2     Results from last 7 days   Lab Units 03/16/23  0928   SODIUM mmol/L 141   POTASSIUM mmol/L 4 8   CHLORIDE mmol/L 104   CO2 mmol/L 31   BUN mg/dL 30*   CREATININE mg/dL 1 77*   ANION GAP mmol/L 6   CALCIUM mg/dL 9 2   ALBUMIN g/dL 2 8*   TOTAL BILIRUBIN mg/dL 2 84*   ALK PHOS U/L 83   ALT U/L 38   AST U/L 42*   GLUCOSE RANDOM mg/dL 75     Results from last 7 days   Lab Units 03/16/23  0928   INR  2 47*     Results from last 7 days   Lab Units 03/16/23  1032 03/16/23  0948 03/16/23  0916   POC GLUCOSE mg/dl 116 66 62*         Results from last 7 days   Lab Units 03/16/23  0931 03/16/23  0928   LACTIC ACID mmol/L  --  1 7   PROCALCITONIN ng/ml 0 05  --        Lines/Drains:  Invasive Devices     Peripheral Intravenous Line  Duration           Peripheral IV 02/17/23 Proximal;Right;Ventral (anterior) Forearm 26 days    Peripheral IV 03/16/23 <1 day    Peripheral IV 03/16/23 Distal;Right;Upper;Ventral (anterior) Arm <1 day    Peripheral IV 03/16/23 Left;Ventral (anterior) Wrist <1 day                    Imaging: Reviewed radiology reports from this admission including: chest xray, chest CT scan and CT head  PE Study with CT Abdomen and Pelvis with contrast   Final Result by Evangelina Weldon MD (03/16 1119)      No large central pulmonary embolism seen      Nonocclusive the segmental pulmonary embolism in the right lower lobe segmental pulmonary artery branch, image 152 series 2   Measured RV/LV ratio is within normal limits at less than 0 9  Moderate to large right effusion   Moderate left effusion with some presacral edema and anterior abdominal wall edema, evaluate for heart failure      Cholelithiasis      Enhancing area seen within the liver parenchyma, incompletely characterized probably due to vascular shunt however nonemergent evaluation with the MRI suggested for definite extension to exclude any underlying lesion  Incidentally noted is a 1 5 cm cystic pancreatic lesion which can also be evaluated at the time of the MRI          I personally discussed this study with Slava Davila on 3/16/2023 at 11:10 AM                Workstation performed: [id number]         CT head without contrast   Final Result by Kasey Corcoran MD (03/16 104)      No acute intracranial abnormality  Workstation performed: [id number]         XR chest portable   Final Result by Juvencio Gale MD (03/16 1118)      Mild cardiomegaly  Mild vascular congestion  Large right, small left pleural effusions      Right middle lobe atelectasis  Workstation performed: [id number]             EKG and Other Studies Reviewed on Admission:   · EKG: Atrial fibrillation  HR 97     ** Please Note: This note has been constructed using a voice recognition system   **

## 2023-03-16 NOTE — ASSESSMENT & PLAN NOTE
Lab Results   Component Value Date    EGFR 33 03/16/2023    EGFR 56 02/20/2023    EGFR 65 02/19/2023    CREATININE 1 77 (H) 03/16/2023    CREATININE 1 16 02/20/2023    CREATININE 1 02 02/19/2023     · Creatinine of 1 77 on admission, this is above patient's baseline  · Possibly cardiorenal   · We will need to monitor creatinine closely while diuresing  · Avoid nephrotoxins and hypotension  · Nephrology consult
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
## 2023-03-16 NOTE — ASSESSMENT & PLAN NOTE
· Patient currently residing at [address] [place] [address] for short-term rehab after a fall    · PT/OT

## 2023-03-17 ENCOUNTER — HOME CARE VISIT (OUTPATIENT)
Dept: HOME HEALTH SERVICES | Facility: HOME HEALTHCARE | Age: 88
End: 2023-03-17

## 2023-03-17 NOTE — BRIEF OP NOTE (RAD/CATH)
IR THORACENTESIS Procedure Note    PATIENT NAME: Faythe Hatchet  : 1935  MRN: [id number]    Pre-op Diagnosis:   1  DANIEL (acute kidney injury) ([facility] [address] )    2  Altered mental status    3  Pulmonary embolism ([facility] [address] )    4  Supplemental oxygen dependent    5  Hypoglycemia    6  Acute on chronic systolic congestive heart failure (HCC)      Post-op Diagnosis:   1  DANIEL (acute kidney injury) ([facility] [address] )    2  Altered mental status    3  Pulmonary embolism ([facility] [address] )    4  Supplemental oxygen dependent    5  Hypoglycemia    6  Acute on chronic systolic congestive heart failure [facility])        Provider:   Alejandro Munguia PA-C     Verbal consent obtained over the phone from son Jude Snyder  Patient unable to consent for self  Procedure and risks/benefits were explained in detail to son  Consent given to proceed with thoracentesis       Estimated Blood Loss: none    Findings: R thoracentesis, 1500mL clear yellow    Specimens: collected and sent    Complications:  None immediate    Anesthesia: local    Alejandro Munguia PA-C     Date: 3/17/2023  Time: 4:48 PM

## 2023-03-17 NOTE — ACP (ADVANCE CARE PLANNING)
Serious Illness Conversation    1  What is your understanding now of where you are with your illness? Prognostic Understanding: appropriate understanding of prognosis, discussed with son at bedside     2  How much information about what is likely to be ahead with your illness would you like to have? Information: patient wants to be fully informed as discussed with son        6  If you become sicker, how much are you willing to go through for the possibility of gaining more time? Be in the hospital: No Have a feeding tube: No   Be in the ICU: No Live in a nursing home: No   Be on a ventilator: No Be uncomfortable: No   Be on dialysis: No Undergo aggressive test and/or procedures: No   9  How much does your proxy and family know about your priorities and wishes? I have spent 30 minutes speaking with my patient on advanced care planning today or during this visit     Advanced directives       Due to patient's new diagnosis of acute pulmonary embolism, heart failure with history of EF 25% had further discussion with son at bedside regarding hospice  Has trialed diuretics without significant output  Son at bedside confirmed that he is a DNR/DNI  Given patient's poor prognosis and unable to diurese with heart failure, son was agreeable to hospice  Further discussion to be had later today with his sister regarding comfort care  We will consult hospice at this time  Addendum:  Call and discussed with son at 2:15pm, okay for comfort care  Code state changed to Level 4 comfort care  Will discontinue current treatment        Fox Esteban MD

## 2023-03-17 NOTE — SEDATION DOCUMENTATION
Right sided thoracentesis complete  1500ml of clear yellow fluid removed  Patient offering no complaints  Bandage applied

## 2023-03-17 NOTE — PROGRESS NOTES
Luis En[address] 45  Progress Note Josue Beam 1935, 80 y o  male MRN: [id number]  Unit/Bed#: -01 Encounter: [id number]  Primary Care Provider: Juanito Casper DO   Date and time admitted to hospital: 3/16/2023  9:04 AM    * Acute on chronic systolic congestive heart failure ([facility] [address] )  Assessment & Plan  Wt Readings from Last 3 Encounters:   03/17/23 113 kg (249 lb 5 4 oz)   02/20/23 97 1 kg (214 lb)   12/02/22 107 kg (236 lb 12 4 oz)     · Patient presents from [facility] with reports of hypoxia and shortness of breath  · CT chest PE study: Moderate to large right effusion  Moderate left effusion with some presacral edema and anterior abdominal wall edema, evaluate for heart failure  · Trial Lasix drip with nephrology, no significant urine output over the last 12 hours  · Currently requiring 2 to 3 L nasal cannula, sats in upper 90s  · Discussed with son at bedside, agreeable for hospice  · Hospice consulted          Encephalopathy  Assessment & Plan  · Nursing staff at [facility] reported the patient to be confused and not his self  · Patient currently appears to be at baseline per the patient's family at the bedside however they do state the patient has been declining over the last few months  · CT head negative  · Suspect patient has baseline dementia    Pulmonary embolism ([facility] [address] )  Assessment & Plan  · Patient with history of afib on eliquis  Family reports patient was previously on Coumadin but was switched to Eliquis  · CTA chest PE study: Nonocclusive the segmental pulmonary embolism in the right lower lobe  · Had been on Eliquis  · Family agreeable for hospice    Goals of care, counseling/discussion  Assessment & Plan  Patient with history of systolic heart failure, prior EF showing 25%, presented with worsening altered mental status  Found to have new acute right lobe PE but has been on anticoagulation    Significant pleural effusions noted on CT imaging, attempted diuresis with Lasix drip with no significant output  Further goals of care discussion had with son at bedside, agreeable for hospice  Hospice consulted    Acute renal failure superimposed on stage 3a chronic kidney disease [facility])  Assessment & Plan  Lab Results   Component Value Date    EGFR 32 2023    EGFR 33 2023    EGFR 56 2023    CREATININE 1 82 (H) 2023    CREATININE 1 77 (H) 2023    CREATININE 1 16 2023     · Creatinine of 1 77 on admission, this is above patient's baseline  · Slightly worsening creatinine today compared to previous  · Appreciate nephrology input    VTE Pharmacologic Prophylaxis:   Pharmacologic: Heparin Drip  Mechanical VTE Prophylaxis in Place: Yes    Patient Centered Rounds: I have performed bedside rounds with nursing staff today  Discussions with Specialists or Other Care Team Provider: Nephrology    Education and Discussions with Family / Patient: son at bedside    Current Length of Stay: 1 day(s)    Current Patient Status: Inpatient   Certification Statement: The patient will continue to require additional inpatient hospital stay due to pending hospice consult    Discharge Plan: pending    Code Status: Level 3 - DNAR and DNI      Subjective:   No events overnight  Currently on 2-3L NC  Does not appear in distress  Alert and oriented to self  No significant output  Son at bedside and discussed hospice and agreeable  Objective:     Vitals:   Temp (24hrs), Av 1 °F (36 2 °C), Min:96 5 °F (35 8 °C), Max:97 7 °F (36 5 °C)    Temp:  [96 5 °F (35 8 °C)-97 7 °F (36 5 °C)] 97 7 °F (36 5 °C)  HR:  [106-119] 110  Resp:  [12-26] 12  BP: ()/(61-80) 97/61  SpO2:  [95 %-100 %] 100 %  Body mass index is 33 82 kg/m²  Input and Output Summary (last 24 hours):        Intake/Output Summary (Last 24 hours) at 3/17/2023 1224  Last data filed at 3/17/2023 0851  Gross per 24 hour   Intake 30 17 ml   Output 550 ml   Net -519 83 ml       Physical Exam:     Physical Exam  Vitals and nursing note reviewed  Constitutional:       Appearance: He is obese  He is ill-appearing  HENT:      Head: Normocephalic and atraumatic  Eyes:      General: No scleral icterus  Conjunctiva/sclera: Conjunctivae normal    Cardiovascular:      Rate and Rhythm: Tachycardia present  Rhythm irregular  Pulmonary:      Breath sounds: Rales present  No wheezing  Abdominal:      General: Bowel sounds are normal  There is no distension  Palpations: Abdomen is soft  Musculoskeletal:         General: No swelling  Right lower leg: Edema present  Left lower leg: Edema present  Skin:     General: Skin is warm and dry  Neurological:      Mental Status: He is disoriented  Additional Data:     Labs:    Results from last 7 days   Lab Units 03/17/23  0439 03/16/23  0928   WBC Thousand/uL 6 31 6 43   HEMOGLOBIN g/dL 16 3 16 4   HEMATOCRIT % 51 9* 52 2*   PLATELETS Thousands/uL 172 180   NEUTROS PCT %  --  66   LYMPHS PCT %  --  21   MONOS PCT %  --  11   EOS PCT %  --  2     Results from last 7 days   Lab Units 03/17/23  0439   SODIUM mmol/L 146   POTASSIUM mmol/L 4 2   CHLORIDE mmol/L 104   CO2 mmol/L 33*   BUN mg/dL 30*   CREATININE mg/dL 1 82*   ANION GAP mmol/L 9   CALCIUM mg/dL 9 2   ALBUMIN g/dL 2 7*   TOTAL BILIRUBIN mg/dL 2 83*   ALK PHOS U/L 80   ALT U/L 37   AST U/L 33   GLUCOSE RANDOM mg/dL 68     Results from last 7 days   Lab Units 03/16/23  0928   INR  2 47*     Results from last 7 days   Lab Units 03/16/23  1032 03/16/23  0948 03/16/23  0916   POC GLUCOSE mg/dl 116 66 62*         Results from last 7 days   Lab Units 03/16/23  0931 03/16/23  0928   LACTIC ACID mmol/L  --  1 7   PROCALCITONIN ng/ml 0 05  --            * I Have Reviewed All Lab Data Listed Above  * Additional Pertinent Lab Tests Reviewed: All Labs For Current Hospital Admission Reviewed    Imaging:    XR chest portable    Result Date: 3/16/2023  Impression: Mild cardiomegaly   Mild vascular congestion  Large right, small left pleural effusions Right middle lobe atelectasis  Workstation performed: [id number]     CT head without contrast    Result Date: 3/16/2023  Impression: No acute intracranial abnormality  Workstation performed: [id number]     PE Study with CT Abdomen and Pelvis with contrast    Result Date: 3/16/2023  Impression: No large central pulmonary embolism seen Nonocclusive the segmental pulmonary embolism in the right lower lobe segmental pulmonary artery branch, image 152 series 2 Measured RV/LV ratio is within normal limits at less than 0 9  Moderate to large right effusion Moderate left effusion with some presacral edema and anterior abdominal wall edema, evaluate for heart failure Cholelithiasis Enhancing area seen within the liver parenchyma, incompletely characterized probably due to vascular shunt however nonemergent evaluation with the MRI suggested for definite extension to exclude any underlying lesion  Incidentally noted is a 1 5 cm cystic pancreatic lesion which can also be evaluated at the time of the MRI  I personally discussed this study with Kenia Haley on 3/16/2023 at 11:10 AM  Workstation performed: [id number]       Recent Cultures (last 7 days):     Results from last 7 days   Lab Units 03/16/23  0939 03/16/23  0928   BLOOD CULTURE  Received in Microbiology Lab  Culture in Progress  Received in Microbiology Lab  Culture in Progress         Last 24 Hours Medication List:   Current Facility-Administered Medications   Medication Dose Route Frequency Provider Last Rate   • acetaminophen  650 mg Oral Q4H PRN Timmy Grater, PA-C     • dextrose  42 mL/hr Intravenous Continuous [place], DO 42 mL/hr (03/17/23 1373)   • furosemide  10 mg/hr Intravenous Continuous [facility] Pedro, DO Stopped (03/17/23 0331)   • heparin (porcine)  3-30 Units/kg/hr (Order-Specific) Intravenous Titrated Timmy Grater, PA-C 12 Units/kg/hr (03/17/23 5814)   • heparin (porcine)  3,800 Units Intravenous Q6H PRN Cristy Ren, PA-C     • heparin (porcine)  7,600 Units Intravenous Q6H PRN Cristy Ren, PA-C     • metoprolol succinate  75 mg Oral BID Cristy Ren, PA-C     • midodrine  5 mg Oral TID AC [facility] Pedro, DO     • ondansetron  4 mg Intravenous Q6H PRN Cristy Forward, PA-C     • pantoprazole  40 mg Oral Early Morning ELANA Camacho-C     • potassium chloride  10 mEq Oral BID Cristy Ren, PA-C     • pravastatin  80 mg Oral Daily With Dinner ELANA Camacho-C     • sertraline  100 mg Oral Daily ULICES CamachoC          Today, Patient Was Seen By: Pardeep Dewitt MD    ** Please Note: Dictation voice to text software may have been used in the creation of this document   **

## 2023-03-17 NOTE — PROGRESS NOTES
-- Patient:  -- MRN: [id number]  -- Aidin Request ID: [id number]  -- Level of care reserved: translation missing: en provider  provider_type  -- Partner Reserved:  -- Clinical needs requested:  -- Geography searched: [id number]  -- Start of Service:  -- Request sent: 1:06pm EDT on 3/17/2023 by Vita Vera  -- Partner reserved: by Vita Vera  -- Choice list shared: 3:59pm EDT on 3/17/2023 by Vita Vera

## 2023-03-17 NOTE — QUICK NOTE
Patient going hospice  Discussed care with primary  Recommend comfort based approach, feel that he is at end of life  I will sign off  Please call nephrology with questions

## 2023-03-17 NOTE — DISCHARGE INSTR - OTHER ORDERS
Skin and Wound Care Plan:   1  Betadine to right foot toes wounds daily  2  Elevate heels off the bed with pillows   3  Turn and reposition every two hours  4  Hydraguard to bilateral heels, buttocks and sacrum BID and PRN  5  Skin nourishing cream daily  6  Cleanse right lower anterior leg wounds with NSS and 4X4, pat dry   Place Maxorb on wound beds, cover with Allevyn life silicone bordered dressing, sarah with T, date, change every other day and PRN

## 2023-03-17 NOTE — HOSPICE NOTE
Received referral and patient was approved for [facility](at home or SNF)  I spoke with son Alexx Sahu and daughter Frod Pandya and reviewed hospice services and they were both agreeable  They would like their father to return to [facility]  Florian Posada CM know  Will continue to follow

## 2023-03-17 NOTE — ASSESSMENT & PLAN NOTE
· Patient with history of afib on eliquis   Family reports patient was previously on Coumadin but was switched to Eliquis  · CTA chest PE study: Nonocclusive the segmental pulmonary embolism in the right lower lobe  · Had been on Eliquis  · Family agreeable for hospice

## 2023-03-17 NOTE — WOUND OSTOMY CARE
Consult Note - Wound   Edilson Gloriar 80 y o  male MRN: [id number]  Unit/Bed#: -01 Encounter: [id number]    History and Present Illness:  80year old male patient admitted from nursing home with acute on chronic CHF  Wound care consulted for right pretibial wound and right foot 1st and 2nd toes  Patient history significant for a-fib, ambulatory dysfunction, encephalopathy, CVA, anxiety and depression, cellulitis of the lower extremity and bacteremia  Assessment Findings:   Patient in bed for assessment, sons at the bedside  Per sons, patient to be seen by hospice as they were consulted  Patient moaned during assessment, RN states his BP too low for pain medication  Patient transitioned to comfort care since assessment  Condom catheter for urinary management, incontinent of stool  Patient seen with primary RN  1 Did not turn patient to assess buttocks and sacrum, per RN area is  Intact  2  Bilateral heels, groin folds pink and intact  3  Right foot 1st and 2nd toes, brown dry eschar, no drainage, betadine applied  4  Right pretibial wounds, cluster of 3 wound beds, all beefy red with scant bloody drainage from lateral wound, maxorb placed and covered with Allevyn foam dressing    Patient made comfort care, will sign off    Skin and Wound Care Plan:   1  Betadine to right foot toes wounds daily  2  Elevate heels off the bed with pillows   3  Turn and reposition every two hours  4  Hydraguard to bilateral heels, buttocks and sacrum BID and PRN  5  Skin nourishing cream daily  6  Cleanse right lower anterior leg wounds with NSS and 4X4, pat dry  Place Maxorb on wound beds, cover with Allevyn life silicone bordered dressing, sarah with T, date, change every other day and PRN    Wounds:  Wound 02/18/23 Traumatic Laceration Toe (Comment  which one) Right; Anterior;Plantar (Active)   Wound Image   03/16/23 2221   Wound Description Brown;Dry;Eschar 03/17/23 1232   Pressure Injury Stage U 03/16/23 1915   Olga-wound Assessment Dry 03/17/23 1232   Drainage Amount None 03/16/23 1915   Treatments Cleansed;Site care 03/16/23 1915   Dressing Open to air 03/16/23 1915       Wound 02/18/23 Traumatic Laceration Toe (Comment  which one) Anterior;Right (Active)   Wound Image    03/17/23 1230   Wound Description Black; Brown;Dry;Eschar 03/17/23 1230   Pressure Injury Stage U 03/16/23 1915   Olga-wound Assessment Dry 03/17/23 1230   Wound Length (cm) 4 cm 03/17/23 1230   Wound Width (cm) 1 cm 03/17/23 1230   Wound Depth (cm) 0 cm 03/17/23 1230   Wound Surface Area (cm^2) 4 cm^2 03/17/23 1230   Wound Volume (cm^3) 0 cm^3 03/17/23 1230   Calculated Wound Volume (cm^3) 0 cm^3 03/17/23 1230   Treatments Site care 03/16/23 1915   Dressing Open to air 03/17/23 1230   Patient Tolerance Tolerated well 03/17/23 1230       Wound 03/16/23 Other (comment) Other (Comment) Pretibial Distal;Right (Active)   Wound Image   03/17/23 1232   Wound Description Beefy red 03/17/23 1232   Olga-wound Assessment Edema 03/17/23 1232   Wound Length (cm) 5 cm 03/17/23 1232   Wound Width (cm) 2 cm 03/17/23 1232   Wound Depth (cm) 0 1 cm 03/17/23 1232   Wound Surface Area (cm^2) 10 cm^2 03/17/23 1232   Wound Volume (cm^3) 1 cm^3 03/17/23 1232   Calculated Wound Volume (cm^3) 1 cm^3 03/17/23 1232   Drainage Amount Scant 03/17/23 1232   Drainage Description Bloody 03/17/23 1232   Non-staged Wound Description Partial thickness 03/17/23 1232   Treatments Cleansed 03/17/23 1232   Dressing Calcium Alginate; Foam, Silicon (eg  Allevyn, etc) 03/17/23 1232   Dressing Changed Changed 03/17/23 1232   Patient Tolerance Tolerated well 03/17/23 1232   Dressing Status Clean;Dry; Intact 03/16/23 1915     Reviewed plan of care with primary RN Pina De Jesus  Recommendations written as orders  Wound care team to sign off  Questions or concerns [facility]    Mathew HONG, RN, [employer]

## 2023-03-17 NOTE — SPEECH THERAPY NOTE
Speech Language/Pathology  Speech/Language Pathology  Assessment    Patient Name: Corey Barillas  SPRBG'O Date: 3/17/2023     Problem List  Principal Problem:    Acute on chronic systolic congestive heart failure ([address] )  Active Problems:    Atrial fibrillation with RVR (HCC)    Acute renal failure superimposed on stage 3a chronic kidney disease (HCC)    Goals of care, counseling/discussion    Ambulatory dysfunction    Pulmonary embolism (HCC)    Encephalopathy    Past Medical History  Past Medical History:   Diagnosis Date   • Atrial fibrillation ([facility] [address] )    • CHF (congestive heart failure) ([facility] [address] )    • Hypertension      Past Surgical History  No past surgical history on file  History of Present Illness Chart Review:  "Corey Barillas is a 80 y o  male with a PMH of A-fib, CHF, CKD 3 who presents from [facility] due to hypoxia and shortness of breath  According to the nursing home staff the patient was noted to have a O2 sat in the 80s this morning and was noted to be short of breath which prompted them to call EMS  They also reported the patient to be confused  On arrival to the emergency department the patient had a nonrebreather on and was then transitioned to 6 L of nasal cannula  The patient wears 2 L of O2 at night chronically but none during the day  CTA chest showed nonocclusive segmental pulmonary embolism in the right lower lobe  Moderate to large right pleural effusion moderate left effusion with some presacral edema and anterior abdominal wall edema  In the emergency department the patient is noted to be in A-fib with a heart rate in the low 100s  The patient himself is sleeping but easily aroused  He denies any complaints "       Consult received and appreciated  Case d/w RN, patient with increased lethargy and oral holding food earlier this date  Hospice care consult in place  Pt currently on dysphagia level 2 solids/honey thick liquids   Pt received in bed somnolent  and mouth breathing without any attempt to open eyes  With max cue he did briefly moan but otherwise not appropriate at this time for a swallow evaluation  Pt without any orientation or attempt with reception to oral care  Continue diet only as tolerated  Will continue follow up for a swallow evaluation as patient appropriate to participate

## 2023-03-17 NOTE — PROGRESS NOTES
Progress Note - Nephrology   [facility] 80 y o  male MRN: [id number]  Unit/Bed#: -01 Encounter: [id number]    A/P:  1  DANIEL suspect cardiorenal syndrome  At least at moderate risk for post contrast DANIEL with CTA  UOP nonoliguric 950ml/24 hr   Cr 1 7->1 8  Baseline 1 0-1 1  Appears ansarcic  He is difficult to diurese with severely reduced EF and hypotension  Lasix gtt at 10mg/hr stopped at 330 AM  He cannot receive his midodrine due to lethargy  Discussed with RN if BP allows to restart  Hold parameters for less than 100 SBP, may need to readdress as we go  Would consider thoracentesis to help manage moderate to large effusions  Family should be contacted to review risks/benefits of such procedures  Patient is on anticoagulation which may affect timing of procedures  Ultimately, GOC discussion is most pertinent  How aggressive should care be in this elderly patient with severe comorbid illness  He would be a very poor dialysis candidate with severely reduced EF  2  Moderate to large pleural effusions due to CHF  Diuresis difficult to low BP and his mentation limits midodrine use  Consider thoracentesis to help manage effusions  3  Acute on chronic systolic CHF  Lasix gtt at 10mg/hr as BP allows  Cr trends stable for now  4  Altered mental status/lethargy  Consider speech eval and hold PO medicine if pt altered  5  Hypernatremia due to diuresis/inadequate free water intake  Gentle D5W  Even though total body sodium overload, he has a water deficit that needs to be addressed  Especially with altered mental status he will not be able to take in free water  Patient is thirsty and requesting water  Follow up reason for today's visit:     DANIEL   Subjective:   Patient seen and examined today  He is requesting water  Due to lethargy overnight he did not receive midodrine  His lasix gtt has been off since 330 AM due to SBP<100  Patient on 4 L NC    Difficult to obtain ROS due to mentation  Patient moaning overnight and required dilaudid administration  Objective:     Vitals: Blood pressure 95/69, pulse (!) 109, temperature (!) 97 1 °F (36 2 °C), temperature source Axillary, resp  rate (!) 26, height 6' (1 829 m), weight 113 kg (249 lb 5 4 oz), SpO2 95 %  ,Body mass index is 33 82 kg/m²  Weight (last 2 days)     Date/Time Weight    03/17/23 0545 113 (249 34)    03/16/23 1816 116 (256 4)            Intake/Output Summary (Last 24 hours) at 3/17/2023 0641  Last data filed at 3/17/2023 5116  Gross per 24 hour   Intake 0 17 ml   Output 950 ml   Net -949 83 ml     I/O last 3 completed shifts:  In: -   Out: 400 [Urine:400]         Physical Exam: BP 95/69 (BP Location: Left arm)   Pulse (!) 109   Temp (!) 97 1 °F (36 2 °C) (Axillary)   Resp (!) 26   Ht 6' (1 829 m)   Wt 113 kg (249 lb 5 4 oz)   SpO2 95%   BMI 33 82 kg/m²     General Appearance:   lethargic    Head:    Normocephalic, without obvious abnormality, atraumatic   Eyes:    Conjunctiva/corneas clear   Ears:    Normal external ears   Nose:   Nares normal, septum midline, mucosa normal, no drainage    or sinus tenderness   Throat:   Dry MM, yellow film in mouth    Neck:    Back:      Lungs:     Rales    Chest wall:    No tenderness or deformity   Heart:   irregularly irregular, tachycardic , no murmur, rub   or gallop   Abdomen:     Soft, non-tender, bowel sounds active   Extremities:   Diffuse edema    Skin:      Lymph nodes:   Cervical normal   Neurologic:   Lethargic             Lab, Imaging and other studies: I have personally reviewed pertinent labs    CBC:   Lab Results   Component Value Date    WBC 6 31 03/17/2023    HGB 16 3 03/17/2023    HCT 51 9 (H) 03/17/2023     (H) 03/17/2023     03/17/2023    MCH 32 1 03/17/2023    MCHC 31 4 03/17/2023    RDW 17 7 (H) 03/17/2023    MPV 10 3 03/17/2023    NRBC 0 03/16/2023     CMP:   Lab Results   Component Value Date    K 4 2 03/17/2023     03/17/2023    CO2 33 (H) 03/17/2023    BUN 30 (H) 03/17/2023    CREATININE 1 82 (H) 03/17/2023    CALCIUM 9 2 03/17/2023    AST 33 03/17/2023    ALT 37 03/17/2023    ALKPHOS 80 03/17/2023    EGFR 32 03/17/2023         Results from last 7 days   Lab Units 03/17/23  0439 03/16/23  0928   POTASSIUM mmol/L 4 2 4 8   CHLORIDE mmol/L 104 104   CO2 mmol/L 33* 31   BUN mg/dL 30* 30*   CREATININE mg/dL 1 82* 1 77*   CALCIUM mg/dL 9 2 9 2   ALK PHOS U/L 80 83   ALT U/L 37 38   AST U/L 33 42*         Phosphorus: No results found for: PHOS  Magnesium:   Lab Results   Component Value Date    MG 2 1 03/17/2023     Urinalysis:   Lab Results   Component Value Date    COLORU Latrice (A) 03/16/2023    CLARITYU Clear 03/16/2023    SPECGRAV 1 025 03/16/2023    PHUR 5 5 03/16/2023    LEUKOCYTESUR Negative 03/16/2023    NITRITE Negative 03/16/2023    GLUCOSEU Negative 03/16/2023    KETONESU Negative 03/16/2023    BILIRUBINUR 1+ (A) 03/16/2023    BLOODU 1+ (A) 03/16/2023     Ionized Calcium: No results found for: CAION  Coagulation:   Lab Results   Component Value Date    INR 2 47 (H) 03/16/2023     Troponin: No results found for: TROPONINI  ABG:   Lab Results   Component Value Date    PHART 7 444 03/16/2023    JPT3HFA 41 4 03/16/2023    PO2ART 290 1 (H) 03/16/2023    DPE7KTI 27 7 03/16/2023    BEART 3 3 03/16/2023    SOURCE Radial, Right 03/16/2023     Radiology review:     IMAGING  Procedure: XR chest portable    Result Date: 3/16/2023  Narrative: CHEST INDICATION:   hypoxia noted at [facility]  COMPARISON:  2/17/2023 EXAM PERFORMED/VIEWS:  XR CHEST PORTABLE FINDINGS: Heart shadow is mildly enlarged but unchanged from prior exam  Mild vascular congestion  Large right, small left pleural effusions  Right middle lobe atelectasis  Osseous structures appear within normal limits for patient age  Impression: Mild cardiomegaly  Mild vascular congestion  Large right, small left pleural effusions Right middle lobe atelectasis   Workstation performed: [id number] Procedure: CT head without contrast    Result Date: 3/16/2023  Narrative: CT BRAIN - WITHOUT CONTRAST INDICATION:   Mental status change, unknown cause altered mental status  COMPARISON:  CT 2/17/2023  TECHNIQUE:  CT examination of the brain was performed  In addition to axial images, sagittal and coronal 2D reformatted images were created and submitted for interpretation  Radiation dose length product (DLP) for this visit:  894 47 mGy-cm   This examination, like all CT scans performed in the [facility], was performed utilizing techniques to minimize radiation dose exposure, including the use of iterative  reconstruction and automated exposure control  IMAGE QUALITY:  Diagnostic  FINDINGS: PARENCHYMA: Decreased attenuation is noted in periventricular and subcortical white matter demonstrating an appearance that is statistically most likely to represent moderate microangiopathic change  Small old left temporal infarct reidentified  No CT signs of acute infarction  No intracranial mass, mass effect or midline shift  No acute parenchymal hemorrhage  VENTRICLES AND EXTRA-AXIAL SPACES:  Normal for the patient's age  VISUALIZED ORBITS: Normal visualized orbits  PARANASAL SINUSES: Normal visualized paranasal sinuses  CALVARIUM AND EXTRACRANIAL SOFT TISSUES:  Normal      Impression: No acute intracranial abnormality  Workstation performed: [id number]     Procedure: PE Study with CT Abdomen and Pelvis with contrast    Result Date: 3/16/2023  Narrative: CT PULMONARY ANGIOGRAM OF THE CHEST AND CT ABDOMEN AND PELVIS WITH INTRAVENOUS CONTRAST INDICATION:   hypoxia, tender abdomen on exam  COMPARISON:  None  TECHNIQUE:  CT examination of the chest, abdomen and pelvis was performed  Thin section CT angiographic technique was used in the chest in order to evaluate for pulmonary embolus and coronal 3D MIP postprocessing was performed on the acquisition scanner   Axial, sagittal, and coronal 2D reformatted images were created from the source data and submitted for interpretation  Radiation dose length product (DLP) for this visit:  2071 32 mGy-cm   This examination, like all CT scans performed in the [facility], was performed utilizing techniques to minimize radiation dose exposure, including the use of iterative reconstruction and automated exposure control  IV Contrast:  100 mL of iohexol (OMNIPAQUE) Enteric Contrast:  Enteric contrast was not administered  FINDINGS: CHEST PULMONARY ARTERIAL TREE:  There is a nonocclusive linear filling defect in the right lower lobe segmental vessels, seen in image 157 series 2 and in image 99 series 601 Geographic sharply defined area projecting over the right upper lobe pulmonary artery in image 111 series 2 likely artifactual due to streak artifact from the dense contrast in the SVC No central pulmonary embolism seen RV LV ratio is 0 86 LUNGS:  Compressive atelectasis right lower lobe No acute consolidation seen PLEURA:  Large right effusion seen Small left effusion seen HEART/AORTA:  Heart is unremarkable for patient's age  No thoracic aortic aneurysm  MEDIASTINUM AND MIYA:  Lower right paratracheal lymph nodes and left paratracheal lymph nodes do not meet the criteria for pathologic enlargement on the bases of size CHEST WALL AND LOWER NECK:  No significant axillary lymph node enlargement ABDOMEN LIVER/BILIARY TREE:  There is a enhancing area within the right hepatic lobe, segment 5 which communicates with the portal vein may be vascular shunt Additional enhancing area seen in the segment 7 is also communicates with the portal vein right bronchus GALLBLADDER:  Cholelithiasis seen SPLEEN:  Unremarkable   PANCREAS:  Hypodensity seen in relation of the neck of the pancreas measuring about 1 4 cm, suggest a cystic pancreatic lesion is seen in image 66 series 606 this was also seen in image 57 series 3 ADRENAL GLANDS:  Mild nodularity of both adrenal glands KIDNEYS/URETERS:  Bilateral renal cyst seen An exophytic cyst lower pole right kidney Additional cyst seen lower pole right kidney STOMACH AND BOWEL:  No bowel wall thickening No pneumatosis No free APPENDIX:  No findings to suggest appendicitis  ABDOMINOPELVIC CAVITY:  Presacral soft tissue thickening seen No pneumoperitoneum  No lymphadenopathy  VESSELS:  Unremarkable for patient's age  PELVIS REPRODUCTIVE ORGANS:  Unremarkable for patient's age  URINARY BLADDER:  Unremarkable  ABDOMINAL WALL/INGUINAL REGIONS:  Unremarkable  OSSEOUS STRUCTURES:  No acute compression collapse vertebra No gross lytic lesion     Impression: No large central pulmonary embolism seen Nonocclusive the segmental pulmonary embolism in the right lower lobe segmental pulmonary artery branch, image 152 series 2 Measured RV/LV ratio is within normal limits at less than 0 9  Moderate to large right effusion Moderate left effusion with some presacral edema and anterior abdominal wall edema, evaluate for heart failure Cholelithiasis Enhancing area seen within the liver parenchyma, incompletely characterized probably due to vascular shunt however nonemergent evaluation with the MRI suggested for definite extension to exclude any underlying lesion   Incidentally noted is a 1 5 cm cystic pancreatic lesion which can also be evaluated at the time of the MRI  I personally discussed this study with Aleks Chen on 3/16/2023 at 11:10 AM  Workstation performed: [id number]       Current Facility-Administered Medications   Medication Dose Route Frequency   • acetaminophen (TYLENOL) tablet 650 mg  650 mg Oral Q4H PRN   • dextrose 5 % infusion  42 mL/hr Intravenous Continuous   • furosemide (LASIX) 500 mg infusion 50 mL  10 mg/hr Intravenous Continuous   • heparin (porcine) 25,000 units in 0 45% NaCl 250 mL infusion (premix)  3-30 Units/kg/hr (Order-Specific) Intravenous Titrated   • heparin (porcine) injection 3,800 Units  3,800 Units Intravenous Q6H PRN   • heparin (porcine) injection 7,600 Units  7,600 Units Intravenous Q6H PRN   • metoprolol succinate (TOPROL-XL) 24 hr tablet 75 mg  75 mg Oral BID   • midodrine (PROAMATINE) tablet 5 mg  5 mg Oral TID AC   • ondansetron (ZOFRAN) injection 4 mg  4 mg Intravenous Q6H PRN   • pantoprazole (PROTONIX) EC tablet 40 mg  40 mg Oral Early Morning   • potassium chloride (K-DUR,KLOR-CON) CR tablet 10 mEq  10 mEq Oral BID   • pravastatin (PRAVACHOL) tablet 80 mg  80 mg Oral Daily With Dinner   • sertraline (ZOLOFT) tablet 100 mg  100 mg Oral Daily     Medications Discontinued During This Encounter   Medication Reason   • heparin (VTE/PE) high    • dextrose infusion 10 %    • furosemide (LASIX) injection 40 mg    • furosemide (LASIX) injection 40 mg    • midodrine (PROAMATINE) tablet 5 mg    • midodrine (PROAMATINE) tablet 2 5 mg        Beatriz Baker, DO      This progress note was produced in part using a dictation device which may document imprecise wording from author's original intent

## 2023-03-17 NOTE — CONSULTS
[address] [facility] 1935, 80 y o  male MRN: [id number]  Unit/Bed#: -01 Encounter: [id number]  Primary Care Provider: Alvina Wilson DO   Date and time admitted to hospital: 3/16/2023  9:04 AM    Consults    No new Assessment & Plan notes have been filed under this hospital service since the last note was generated  Service: Cardiology    Other summary comments:   Notified by [facility] attending that due to pt's condition he will be transitioned to Hospice following a family discussion    Cardiology will sign off  Outpatient Cardiologist: Dr Beryl Arias    HPI: Char Jensen is a 80y o  year old male who presented from a [facility] care facility with altered mental status and hypoxia  According to chart review, the patient's son reported that the patient had been slowly declining over the past few weeks  He has had difficulty with swallowing, limiting his oral intake of nutrition  In the ER, patient was found to have nonocclusive PE of the right lower lobe on CTA  Rosa Burgos was on Eliquis prior to admission and is currently on a heparin drip due to Eliquis failure  Moderate to large bilateral pleural effusions were also noted  He was evaluated by nephrology who recommended Lasix infusion and midodrine to support his blood pressure  Unfortunately, he is lethargic and is not taking p o  medications  The Lasix infusion was stopped overnight due to hypotension  At the time of my evaluation, Rosa Burgos eventually opens his eyes  He denies any chest pain or shortness of breath  He does not answer other questions  Cardiac history is significant for severe cardiomyopathy with an EF of 10 to 15% (10/2022)  He has atrial fibrillation and was maintained on Eliquis prior to admission for anticoagulation  He had a prior MI with stenting to the LAD      EKG: afib, LAD, RBBB, cannot exclude lateral ischemia      MOST  RECENT CARDIAC IMAGING:   Echo 2/20/2023  EF 15%  Mild-mod TR  Mild MT    Echo 10/14/2022  EF 10-15%  Bi-atrial enlargement  Mild AS, MT, TR, PI    Review of Systems: a 10 point review of systems was conducted and is negative except for as mentioned in the HPI or as below  Historical Information   Past Medical History:   Diagnosis Date   • Atrial fibrillation ([facility] [address] )    • CHF (congestive heart failure) ([facility] [address] )    • Hypertension      No past surgical history on file  Social History     Substance and Sexual Activity   Alcohol Use Not Currently     Social History     Substance and Sexual Activity   Drug Use Never     Social History     Tobacco Use   Smoking Status Never   Smokeless Tobacco Never       Family History: No longer relevant due to patient age      Meds/Allergies   all current active meds have been reviewed  Medications Prior to Admission   Medication   • apixaban (ELIQUIS) 5 mg   • furosemide (LASIX) 40 mg tablet   • metoprolol succinate (TOPROL-XL) 25 mg 24 hr tablet   • pantoprazole (PROTONIX) 40 mg tablet   • potassium chloride (Klor-Con) 10 mEq tablet   • sertraline (ZOLOFT) 50 mg tablet   • simvastatin (ZOCOR) 40 mg tablet       No Known Allergies    Objective   Vitals: Blood pressure 97/61, pulse (!) 110, temperature 97 7 °F (36 5 °C), resp  rate 12, height 6' (1 829 m), weight 113 kg (249 lb 5 4 oz), SpO2 100 %  , Body mass index is 33 82 kg/m² ,   Orthostatic Blood Pressures    Flowsheet Row Most Recent Value   Blood Pressure 97/61 filed at 03/17/2023 1112   Patient Position - Orthostatic VS Lying filed at 03/17/2023 1127          Systolic (36WAR), UXD:537 , Min:90 , UVB:541     Diastolic (41MVF), FZP:46, Min:61, Max:81    Physical Exam:    General:  Normal appearance in no distress  Eyes:  Anicteric  Oral mucosa:  Dry   Neck:  No JVD  Carotid upstrokes are brisk without bruits  No masses  Chest:  Clear to auscultation  Cardiac:  Irregular  No palpable PMI  Normal S1 and S2  No murmur gallop or rub  Abdomen:  Soft and nontender   No palpable organomegaly or aortic enlargement  Extremities:  Significant bilat LE edema up to the sacrum  Musculoskeletal:  Symmetric  Vascular: Pedal pulses are intact  Neuro:  Grossly symmetric    Psych:  Lethargic, tells me his name      Lab Results:     Troponins:    Results from last 7 days   Lab Units 03/16/23  1552 03/16/23  1232 03/16/23  0928   HS TNI 0HR ng/L  --   --  21   HS TNI 2HR ng/L  --  22  --    HSTNI D2 ng/L  --  1  --    HS TNI 4HR ng/L 22  --   --    HSTNI D4 ng/L 1  --   --      BNP:   Results from last 6 Months   Lab Units 03/16/23  0928 02/17/23  1742   BNP pg/mL 1,415* 1,373*       CBC :   Results from last 7 days   Lab Units 03/17/23  0439 03/16/23  0928   WBC Thousand/uL 6 31 6 43   HEMOGLOBIN g/dL 16 3 16 4   HEMATOCRIT % 51 9* 52 2*   MCV fL 102* 103*   PLATELETS Thousands/uL 172 180     TSH:     CMP:   Results from last 7 days   Lab Units 03/17/23  0439 03/16/23  0928   POTASSIUM mmol/L 4 2 4 8   CHLORIDE mmol/L 104 104   CO2 mmol/L 33* 31   BUN mg/dL 30* 30*   CREATININE mg/dL 1 82* 1 77*   AST U/L 33 42*   ALT U/L 37 38   EGFR ml/min/1 73sq m 32 33     Lipid Profile:     Coags:   Results from last 7 days   Lab Units 03/16/23  0928   INR  2 47*

## 2023-03-17 NOTE — Clinical Note
Bao Kendrick 88yo male  0 [phone] currently at [facility]  Hx of cardiomyopathy, systolic heart failure, CVA, and AFIB  Yessy tin for hypoxia  Found to have PE and pleural effusion  LAst Echo showed EF 15%  Was started on Lasix drip and heparin drip  Family opted for comfort care  PPS 30  Approve, RLOC?

## 2023-03-17 NOTE — ASSESSMENT & PLAN NOTE
Lab Results   Component Value Date    EGFR 32 03/17/2023    EGFR 33 03/16/2023    EGFR 56 02/20/2023    CREATININE 1 82 (H) 03/17/2023    CREATININE 1 77 (H) 03/16/2023    CREATININE 1 16 02/20/2023     · Creatinine of 1 77 on admission, this is above patient's baseline  · Slightly worsening creatinine today compared to previous  · Appreciate nephrology input

## 2023-03-17 NOTE — PROGRESS NOTES
-- Patient:  -- MRN: [id number]  -- Aidin Request ID: [id number]  -- Level of care reserved: [facility]  -- Partner Reserved: [facility], [place], [address] [facility] [phone]  -- Clinical needs requested:  -- Geography searched: [place] around [phone]  -- Start of Service:  -- Request sent: 3:39pm EDT on 3/17/2023 by Janeth Ferguson  -- Partner reserved: 4:56pm EDT on 3/17/2023 by Janeth Ferguson  -- Choice list shared:

## 2023-03-17 NOTE — ASSESSMENT & PLAN NOTE
Wt Readings from Last 3 Encounters:   03/17/23 113 kg (249 lb 5 4 oz)   02/20/23 97 1 kg (214 lb)   12/02/22 107 kg (236 lb 12 4 oz)     · Patient presents from [facility] with reports of hypoxia and shortness of breath  · CT chest PE study: Moderate to large right effusion   Moderate left effusion with some presacral edema and anterior abdominal wall edema, evaluate for heart failure  · Trial Lasix drip with nephrology, no significant urine output over the last 12 hours  · Currently requiring 2 to 3 L nasal cannula, sats in upper 90s  · Discussed with son at bedside, agreeable for hospice  · Hospice consulted

## 2023-03-17 NOTE — ASSESSMENT & PLAN NOTE
· Nursing staff at [facility] reported the patient to be confused and not his self  · Patient currently appears to be at baseline per the patient's family at the bedside however they do state the patient has been declining over the last few months    · CT head negative  · Suspect patient has baseline dementia

## 2023-03-17 NOTE — ASSESSMENT & PLAN NOTE
Patient with history of systolic heart failure, prior EF showing 25%, presented with worsening altered mental status  Found to have new acute right lobe PE but has been on anticoagulation  Significant pleural effusions noted on CT imaging, attempted diuresis with Lasix drip with no significant output    Further goals of care discussion had with son at bedside, agreeable for hospice  Hospice consulted

## 2023-03-17 NOTE — CONSULTS
CONSULTATION-NEPHROLOGY   Lin Tay 80 y o  male MRN: [id number]  Unit/Bed#: -01 Encounter: [id number]        Assessment and Plan:    1  DANIEL, likely cardiorenal syndrome  Anasarcic on exam   Suspect uop will be nonoliguric  Cr baseline 1 0-1 1  Cr 1 7 on admission  At risk for post contrast DANIEL with CTA  Start midodrine 2 5mg TID  Start lasix gtt 10mg/hr  Follow UOP  Poor prognosis with severely reduced EF and volume overload  Recommend [address] discussion  2  Hypotension due to CHF/reduced EF  Follows with Dr Patricia Orr, last seen 2/20  EF 10-15%  Start midodrine 2 5mg TID with hold parameters > 371 systolic  3  Acute on chronic systolic CHF, EF 83-14%  Start lasix at low rate---10mg/hr  Low BP did not allow previous diuretic dosing  He is ansarcic  Cardiology eval, appreciate recommendations  Patient on potassium BID-- follow trends on gtt  K 4 8     4  Pleural effusion, large/ moderate right and moderate left effusion  Recommend thoracentesis if pt/family agreeable  His BP/DANIEL /reduced EF will limit ability to diurese  Previously on elqiuis and now on heparin gtt given eliquis failure  Patient family should be contacted to ensure they agree with plan  5  BL renal cyst  Hold on inpatient eval     6  PE, hx afib on eliquis  Previously on coumadin  Hematology eval   Checking cardiollipin/beta 2 glycoprotein  7  afib RVR   BB with hold parameters  HPI:    Lin Tay is a 80 y o  male with CHF, CKD3, afib, presented to ED from [facility] due to sob/hypoxia  Patient pulse ox in 80s in AM  Patient also confused apparently  He is not an ideal historian  Patient wears 2 L O2 at night and none during the day  He was placed on nonrebreather in ER and transitioned to 6 L  CTA chest showed nonocclusive segmental PE and moderate to large right pleural effusion and moderate left effusion  Cr 1 0-1 1 at baseline  Cr 1 77 on 3/16  Patient on 4 L NC   UOP 400ml so far today  Patient BP borderline SBP   Diuretics on hold due to low BP  Reason for Consult: DANIEL    Review of Systems:    A complete 10-point review of systems was performed  Aside from what was mentioned in the HPI, it is otherwise negative  Historical Information   Past Medical History:   Diagnosis Date   • Atrial fibrillation ([facility] [address] )    • CHF (congestive heart failure) ([facility] [address] )    • Hypertension      No past surgical history on file  Social History   Social History     Substance and Sexual Activity   Alcohol Use Not Currently     Social History     Substance and Sexual Activity   Drug Use Never     Social History     Tobacco Use   Smoking Status Never   Smokeless Tobacco Never       Family History:   No family history on file      Medications:  Pertinent medications were reviewed  Current Facility-Administered Medications   Medication Dose Route Frequency Provider Last Rate   • acetaminophen  650 mg Oral Q4H PRN Michelle Horn PA-C     • furosemide  10 mg/hr Intravenous Continuous Hudson Vasquez DO     • heparin (porcine)  3-30 Units/kg/hr (Order-Specific) Intravenous Titrated Michelle Horn PA-C 18 Units/kg/hr (03/16/23 1221)   • heparin (porcine)  3,800 Units Intravenous Q6H PRN Michelle Horn PA-C     • heparin (porcine)  7,600 Units Intravenous Q6H PRN Michelle Horn PA-C     • metoprolol succinate  75 mg Oral BID Michelle Horn PA-C     • midodrine  2 5 [facility]ID [facility] Scott Gomez DO     • ondansetron  4 mg Intravenous Q6H PRN Michelle Horn PA-C     • [START ON 3/17/2023] pantoprazole  40 mg Oral Early Morning Michelle Horn PA-C     • potassium chloride  10 mEq Oral BID Michelle Horn PA-C     • pravastatin  80 mg Oral Daily With Voodoo-RapidanANAM     • sertraline  100 mg Oral Daily Kan Johnson PA-C           No Known Allergies      Vitals:   /80 (BP Location: Left arm)   Pulse (!) 116   Temp (!) 96 5 °F (35 8 °C) (Temporal)   Resp (!) 24   Ht 6' (1 829 m)   Wt 116 kg (256 lb 6 3 oz)   SpO2 98%   BMI 34 77 kg/m²   Body mass index is 34 77 kg/m²  SpO2: 98 %,   SpO2 Activity: At Rest,   O2 Device: None (Room air)      Intake/Output Summary (Last 24 hours) at 3/16/2023 2040  Last data filed at 3/16/2023 1116  Gross per 24 hour   Intake --   Output 400 ml   Net -400 ml     Invasive Devices     Peripheral Intravenous Line  Duration           Peripheral IV 02/17/23 Proximal;Right;Ventral (anterior) Forearm 27 days    Peripheral IV 03/16/23 <1 day    Peripheral IV 03/16/23 Distal;Right;Upper;Ventral (anterior) Arm <1 day    Peripheral IV 03/16/23 Left;Ventral (anterior) Wrist <1 day                Physical Exam:  General: conscious, cooperative, in no acute distress, anasarca  Eyes: conjunctivae pink, anicteric sclerae  ENT brown film in mouth  Neck: supple, no JVD, no masses  Chest: decrease BS   CVS: irregularly irregular, tachycardic  Abdomen: soft, non-tender, non-distended, normoactive bowel sounds  Extremities: severe BL LE edema   Skin: no rash  Neuro: awake, alert, hard of hearing      Diagnostic Data:  Lab: I have personally reviewed pertinent lab results  ,   CBC:  Results from last 7 days   Lab Units 03/16/23  0928   WBC Thousand/uL 6 43   HEMOGLOBIN g/dL 16 4   HEMATOCRIT % 52 2*   PLATELETS Thousands/uL 180      CMP:   Lab Results   Component Value Date    SODIUM 141 03/16/2023    K 4 8 03/16/2023     03/16/2023    CO2 31 03/16/2023    BUN 30 (H) 03/16/2023    CREATININE 1 77 (H) 03/16/2023    CALCIUM 9 2 03/16/2023    AST 42 (H) 03/16/2023    ALT 38 03/16/2023    ALKPHOS 83 03/16/2023    EGFR 33 03/16/2023   ,   PT/INR:   Lab Results   Component Value Date    INR 2 47 (H) 03/16/2023   ,   Magnesium: No components found for: MAG,  Phosphorous: No results found for: PHOS    Microbiology:  @LABRCNTIP,(urinecx:7)@        Luz Elena Vasquez,     Portions of the record may have been created with voice recognition software   Occasional wrong word or "sound a like" substitutions may have occurred due to the inherent limitations of voice recognition software  Read the chart carefully and recognize, using context, where substitutions have occurred

## 2023-03-17 NOTE — PLAN OF CARE
Problem: SAFETY ADULT  Goal: Patient will remain free of falls  Description: INTERVENTIONS:  - Educate patient/family on patient safety including physical limitations  - Instruct patient to call for assistance with activity   - Consult OT/PT to assist with strengthening/mobility   - Keep Call bell within reach  - Keep bed low and locked with side rails adjusted as appropriate  - Keep care items and personal belongings within reach  - Initiate and maintain comfort rounds  - Make Fall Risk Sign visible to staff  - Offer Toileting every 2 Hours, in advance of need  - Initiate/Maintain bedalarm  - Obtain necessary fall risk management equipment:   Problem: CARDIOVASCULAR - ADULT  Goal: Maintains optimal cardiac output and hemodynamic stability  Description: INTERVENTIONS:  - Monitor I/O, vital signs and rhythm  - Monitor for S/S and trends of decreased cardiac output  - Administer and titrate ordered vasoactive medications to optimize hemodynamic stability  - Assess quality of pulses, skin color and temperature  - Assess for signs of decreased coronary artery perfusion  - Instruct patient to report change in severity of symptoms  Outcome: Progressing     Problem: GENITOURINARY - ADULT  Goal: Maintains or returns to baseline urinary function  Description: INTERVENTIONS:  - Assess urinary function  - Encourage oral fluids to ensure adequate hydration if ordered  - Administer IV fluids as ordered to ensure adequate hydration  - Administer ordered medications as needed  - Offer frequent toileting  - Follow urinary retention protocol if ordered  Outcome: Progressing     Problem: METABOLIC, FLUID AND ELECTROLYTES - ADULT  Goal: Electrolytes maintained within normal limits  Description: INTERVENTIONS:  - Monitor labs and assess patient for signs and symptoms of electrolyte imbalances  - Administer electrolyte replacement as ordered  - Monitor response to electrolyte replacements, including repeat lab results as appropriate  - Instruct patient on fluid and nutrition as appropriate  Outcome: Progressing     Problem: METABOLIC, FLUID AND ELECTROLYTES - ADULT  Goal: Fluid balance maintained  Description: INTERVENTIONS:  - Monitor labs   - Monitor I/O and WT  - Instruct patient on fluid and nutrition as appropriate  - Assess for signs & symptoms of volume excess or deficit  Outcome: Progressing     - Apply yellow socks and bracelet for high fall risk patients  - Consider moving patient to room near nurses station  Outcome: Progressing

## 2023-03-17 NOTE — CASE MANAGEMENT
Case Management Assessment & Discharge Planning Note    Patient name Gladis End  Location [address] 224/-76 MRN [id number]  : 1935 Date 3/17/2023       Current Admission Date: 3/16/2023  Current Admission Diagnosis:Acute on chronic systolic congestive heart failure [facility])   Patient Active Problem List    Diagnosis Date Noted   • Pulmonary embolism ([facility] [address] ) 2023   • Encephalopathy 2023   • Cellulitis of lower extremity 2023   • Pleural effusion on right 2023   • Syncope 2023   • Gastroesophageal reflux disease without esophagitis 2022   • Anxiety and depression 2022   • Ambulatory dysfunction 2022   • Generalized weakness 2022   • Stage 3a chronic kidney disease ([facility] [address] ) 10/25/2022   • Goals of care, counseling/discussion 10/17/2022   • Bacteremia 10/16/2022   • Acute renal failure superimposed on stage 3a chronic kidney disease ([facility] [address] ) 10/14/2022   • Atrial fibrillation with RVR ([facility] [address] ) 10/13/2022   • Acute on chronic systolic congestive heart failure ([facility] [address] ) 10/13/2022   • CAD (coronary artery disease), native coronary artery 10/13/2022   • History of CVA (cerebrovascular accident) 10/13/2022   • Benign essential HTN 10/13/2022   • Hallucinations 10/13/2022   • Chronic respiratory failure with hypoxia ([facility] [address] ) 10/13/2022      LOS (days): 1  Geometric Mean LOS (GMLOS) (days): 3 90  Days to GMLOS:2 6     OBJECTIVE:  PATIENT READMITTED TO HOSPITAL  Risk of Unplanned Readmission Score: 17 72         Current admission status: Inpatient  Referral Reason: Other (d/c planning)    Preferred Pharmacy:   [address] [facility] #[id number] Jerome [address] - [place] [address]  [address] [facility] [address]  Phone: [phone] Fax: [phone]    Primary Care Provider: Jagruti Nguyen DO    Primary Insurance: MEDICARE  Secondary Insurance: [address] [place] [address]    ASSESSMENT:  [address] [facility], [address] [place] [address] Representative - Son   Primary Phone: [phone] (Mobile)                         Readmission Root Cause  30 Day Readmission: Yes  Who directed you to return to the hospital?: Other (comment) [facility])  During previous admission, was a post-acute recommendation made?: Yes  What post-acute resources were offered?: STR  Action Plan: pt will be placed on hospice care    Patient Information  Admitted from[de-identified] Facility  Mental Status: Other (Comment) (lethargic)  During Assessment patient was accompanied by: Not accompanied during assessment  Assessment information provided by<ROLA> Andrew  [place] of Residence: [address] [place] [address] Avenue do you live in?: pt was admitted from [facility] entry access options   Select all that apply : No steps to enter home  Type of Current Residence: Facility [facility])              Patient Information Continued  Income Source: Pension/group home                  DISCHARGE DETAILS:    Discharge planning discussed with<ROLA> son was called at 12:57pm  Freedom of Choice: Yes  Comments - [place] of Choice: hospice consult was received- hopsice agencies were reviewed with the son- he requested [facility]  CM contacted family/caregiver?: Yes             Contacts  Patient Contacts: Jeronimo Spring  Relationship to Patient[de-identified] Family (son)  Contact Method: Phone  Phone Number: [phone]  Reason/Outcome: Discharge [address]         Is the patient interested in [facility] at discharge?: No    DME Referral Provided  Referral made for DME?: No    Other Referral/Resources/Interventions Provided:  Interventions: SNF, Hospice  Referral Comments: referrals sent via [facility] and cm called Tami Anton - she does not use [facility] - she stated the pt is able to return on Sunday on hospice care with Wes Fernandez instructed cm to sent up transport for Sunday and there is a rn that can do the admission- cm sent for transport via roundtrip    Would you like to participate in our [address] service program?  : No - Declined    Treatment Team Recommendation: SNF, Hospice (return to [facility] on [address] [facility] hospice 3/19/2023- bls)

## 2023-03-18 NOTE — ASSESSMENT & PLAN NOTE
· Heart rate slightly elevated in the low 100s  · Continue home regimen of Toprol with hold parameters given low normal blood pressures  · Cardiology consulted; no longer needed, OFF BB, transition to hospice

## 2023-03-18 NOTE — CASE MANAGEMENT
Case Management Discharge Planning Note    Patient name Desmond Baldwin  Location [address] 224/-29 MRN [id number]  : 1935 Date 3/18/2023       Current Admission Date: 3/16/2023  Current Admission Diagnosis:Acute on chronic systolic congestive heart failure [facility])   Patient Active Problem List    Diagnosis Date Noted   • Pulmonary embolism ([facility] [address] ) 2023   • Encephalopathy 2023   • Cellulitis of lower extremity 2023   • Pleural effusion on right 2023   • Syncope 2023   • Gastroesophageal reflux disease without esophagitis 2022   • Anxiety and depression 2022   • Ambulatory dysfunction 2022   • Generalized weakness 2022   • Stage 3a chronic kidney disease ([facility] [address] ) 10/25/2022   • Goals of care, counseling/discussion 10/17/2022   • Bacteremia 10/16/2022   • Acute renal failure superimposed on stage 3a chronic kidney disease ([facility] [address] ) 10/14/2022   • Atrial fibrillation with RVR ([facility] [address] ) 10/13/2022   • Acute on chronic systolic congestive heart failure ([facility] [address] ) 10/13/2022   • CAD (coronary artery disease), native coronary artery 10/13/2022   • History of CVA (cerebrovascular accident) 10/13/2022   • Benign essential HTN 10/13/2022   • Hallucinations 10/13/2022   • Chronic respiratory failure with hypoxia ([facility] [address] ) 10/13/2022      LOS (days): 2  Geometric Mean LOS (GMLOS) (days): 3 90  Days to GMLOS:1 9     OBJECTIVE:  Risk of Unplanned Readmission Score: 17 89      Current admission status: Inpatient   Preferred Pharmacy:   [address] [place] [address] #[id number] [facility] [address]  [address] [place] [id number]  Phone: [phone] Fax: [phone]    Primary Care Provider: Alicia Valerio DO    Primary Insurance: MEDICARE  Secondary Insurance: [facility]    DISCHARGE DETAILS:    Discharge planning discussed with[de-identified] Son Domi Marie Close from [address] of Choice: Yes     Contacts  Patient Contacts: Polo Ferguson  Relationship to Patient[de-identified] Family  Contact Method: Phone  Phone Number: [phone]  Reason/Outcome: Discharge Planning    Treatment Team Recommendation: SNF, Hospice  Discharge Destination Plan[de-identified] SNF, Hospice     Additional Comments: Discharge finalized with MD DR Robert Mckinley for tomorrow  Discussed with Tenisha Quan at [facility]    Transport 10 am   Updated Melany from hospice, nurse Kathleen Ross and son Fernando Service Name, [address] : [address] [facility]  Receiving Facility/Agency Phone Number: [address] [facility] [phone]

## 2023-03-18 NOTE — PROGRESS NOTES
Luis Enrique 45  Progress Note Monse Elena 1935, 80 y o  male MRN: [id number]  Unit/Bed#: -01 Encounter: [id number]  Primary Care Provider: Shakira Santos DO   Date and time admitted to hospital: 3/16/2023  9:04 AM    Encephalopathy  Assessment & Plan  · Nursing staff at [facility] [place] reported the patient to be confused and not his self  · Patient currently appears to be at baseline per the patient's family at the bedside however they do state the patient has been declining over the last few months  · CT head negative  · Suspect patient has baseline dementia    Pulmonary embolism ([facility] [address] )  Assessment & Plan  · Patient with history of afib on eliquis  Family reports patient was previously on Coumadin but was switched to Eliquis  · CTA chest PE study: Nonocclusive the segmental pulmonary embolism in the right lower lobe  · Had been on Eliquis  · Family agreeable for hospice    Ambulatory dysfunction  Assessment & Plan  · Patient currently residing at [facility] skilled nursing [facility] for short-term rehab after a fall  · PT/OT    Goals of care, counseling/discussion  Assessment & Plan  Patient with history of systolic heart failure, prior EF showing 25%, presented with worsening altered mental status  Found to have new acute right lobe PE but has been on anticoagulation  Significant pleural effusions noted on CT imaging, attempted diuresis with Lasix drip with no significant output    Further goals of care discussion had with son at bedside, agreeable for hospice  Hospice consulted    Acute renal failure superimposed on stage 3a chronic kidney disease [facility])  Assessment & Plan  Lab Results   Component Value Date    EGFR 32 03/17/2023    EGFR 33 03/16/2023    EGFR 56 02/20/2023    CREATININE 1 82 (H) 03/17/2023    CREATININE 1 77 (H) 03/16/2023    CREATININE 1 16 02/20/2023     · Creatinine of 1 77 on admission, this is above patient's baseline  · Slightly worsening creatinine today compared to previous  · Appreciate nephrology input    Atrial fibrillation with RVR (HCC)  Assessment & Plan  · Heart rate slightly elevated in the low 100s  · Continue home regimen of Toprol with hold parameters given low normal blood pressures  · Cardiology consulted; no longer needed, OFF BB, transition to hospice    * Acute on chronic systolic congestive heart failure (HCC)  Assessment & Plan  Wt Readings from Last 3 Encounters:   23 113 kg (249 lb 5 4 oz)   23 97 1 kg (214 lb)   22 107 kg (236 lb 12 4 oz)     · Patient presents from [facility] with reports of hypoxia and shortness of breath  · CT chest PE study: Moderate to large right effusion  Moderate left effusion with some presacral edema and anterior abdominal wall edema, evaluate for heart failure  · Trial Lasix drip with nephrology, no significant urine output over the last 12 hours  · Currently requiring 2 to 3 L nasal cannula, sats in upper 90s  · Discussed with son at bedside, agreeable for hospice  · Hospice consulted          VTE Pharmacologic Prophylaxis:   Pharmacologic: Heparin Drip  Mechanical VTE Prophylaxis in Place: Yes    Patient Centered Rounds: I have performed bedside rounds with nursing staff today  Discussions with Specialists or Other Care Team Provider: Nephrology    Education and Discussions with Family / Patient: son at bedside    Current Length of Stay: 2 day(s)    Current Patient Status: Inpatient   Certification Statement: The patient will continue to require additional inpatient hospital stay due to pending hospice consult    Discharge Plan: pending    Code Status: Level 4 - Comfort Care      Subjective:   No events overnight  Currently on 2-3L NC  Does not appear in distress  Alert and oriented to self  No significant output  Son at bedside and discussed hospice and agreeable      Objective:     Vitals:   Temp (24hrs), Av 8 °F (36 6 °C), Min:97 7 °F (36 5 °C), Max:97 8 °F (36 6 °C)    Temp:  [97 7 °F (36 5 °C)-97 8 °F (36 6 °C)] 97 7 °F (36 5 °C)  HR:  [] 94  Resp:  [20] 20  BP: (101-117)/(68-81) 117/81  SpO2:  [100 %] 100 %  Body mass index is 33 82 kg/m²  Input and Output Summary (last 24 hours): Intake/Output Summary (Last 24 hours) at 3/18/2023 1329  Last data filed at 3/18/2023 1300  Gross per 24 hour   Intake 85 ml   Output 600 ml   Net -515 ml       Physical Exam:     Physical Exam  Vitals and nursing note reviewed  Constitutional:       Appearance: He is obese  He is ill-appearing  HENT:      Head: Normocephalic and atraumatic  Eyes:      General: No scleral icterus  Conjunctiva/sclera: Conjunctivae normal    Cardiovascular:      Rate and Rhythm: Tachycardia present  Rhythm irregular  Pulmonary:      Breath sounds: Rales present  No wheezing  Abdominal:      General: Bowel sounds are normal  There is no distension  Palpations: Abdomen is soft  Musculoskeletal:         General: No swelling  Right lower leg: Edema present  Left lower leg: Edema present  Skin:     General: Skin is warm and dry  Neurological:      Mental Status: He is disoriented         Additional Data:     Labs:    Results from last 7 days   Lab Units 03/17/23  0439 03/16/23  0928   WBC Thousand/uL 6 31 6 43   HEMOGLOBIN g/dL 16 3 16 4   HEMATOCRIT % 51 9* 52 2*   PLATELETS Thousands/uL 172 180   NEUTROS PCT %  --  66   LYMPHS PCT %  --  21   MONOS PCT %  --  11   EOS PCT %  --  2     Results from last 7 days   Lab Units 03/17/23  0439   SODIUM mmol/L 146   POTASSIUM mmol/L 4 2   CHLORIDE mmol/L 104   CO2 mmol/L 33*   BUN mg/dL 30*   CREATININE mg/dL 1 82*   ANION GAP mmol/L 9   CALCIUM mg/dL 9 2   ALBUMIN g/dL 2 7*   TOTAL BILIRUBIN mg/dL 2 83*   ALK PHOS U/L 80   ALT U/L 37   AST U/L 33   GLUCOSE RANDOM mg/dL 68     Results from last 7 days   Lab Units 03/16/23  0928   INR  2 47*     Results from last 7 days   Lab Units 03/16/23  1032 03/16/23  0948 03/16/23  0107 POC GLUCOSE mg/dl 116 66 62*         Results from last 7 days   Lab Units 03/16/23  0931 03/16/23  0928   LACTIC ACID mmol/L  --  1 7   PROCALCITONIN ng/ml 0 05  --            * I Have Reviewed All Lab Data Listed Above  * Additional Pertinent Lab Tests Reviewed: All Labs For Current Hospital Admission Reviewed    Imaging:    XR chest portable    Result Date: 3/16/2023  Impression: Mild cardiomegaly  Mild vascular congestion  Large right, small left pleural effusions Right middle lobe atelectasis  Workstation performed: [id number]     CT head without contrast    Result Date: 3/16/2023  Impression: No acute intracranial abnormality  Workstation performed: [id number]     PE Study with CT Abdomen and Pelvis with contrast    Result Date: 3/16/2023  Impression: No large central pulmonary embolism seen Nonocclusive the segmental pulmonary embolism in the right lower lobe segmental pulmonary artery branch, image 152 series 2 Measured RV/LV ratio is within normal limits at less than 0 9  Moderate to large right effusion Moderate left effusion with some presacral edema and anterior abdominal wall edema, evaluate for heart failure Cholelithiasis Enhancing area seen within the liver parenchyma, incompletely characterized probably due to vascular shunt however nonemergent evaluation with the MRI suggested for definite extension to exclude any underlying lesion  Incidentally noted is a 1 5 cm cystic pancreatic lesion which can also be evaluated at the time of the MRI  I personally discussed this study with Carlos Harry on 3/16/2023 at 11:10 AM  Workstation performed: [id number]       Recent Cultures (last 7 days):     Results from last 7 days   Lab Units 03/17/23  1643 03/16/23  0939 03/16/23  0928   BLOOD CULTURE   --  No Growth at 24 hrs  No Growth at 24 hrs     GRAM STAIN RESULT  2+ Mononuclear Cells  No organisms seen  --   --    BODY FLUID CULTURE, STERILE  No growth  --   --        Last 24 Hours Medication List:   Current Facility-Administered Medications   Medication Dose Route Frequency Provider Last Rate   • glycopyrrolate  0 1 mg Intravenous Q4H PRN Nga Urena MD     • haloperidol lactate  0 5 mg Intravenous Q2H PRN Maritza Mcfarland MD     • LORazepam  1 mg Intravenous Q10 Min PRN Nga Urena MD     • morphine injection  2 mg Intravenous Q1H PRN Nga Urena MD          Today, Patient Was Seen By: Perla Colon MD    ** Please Note: Dictation voice to text software may have been used in the creation of this document   ** Call bell/Explanation of exam/test/Side rails

## 2023-03-18 NOTE — ASSESSMENT & PLAN NOTE
· Nursing staff at [facility] reported the patient to be confused and not his self  · Patient currently appears to be at baseline per the patient's family at the bedside however they do state the patient has been declining over the last few months    · CT head negative  · Suspect patient has baseline dementia

## 2023-03-18 NOTE — ASSESSMENT & PLAN NOTE
· Patient currently residing at [address] [place] [address] for short-term rehab after a fall    · PT/OT

## 2023-03-18 NOTE — ASSESSMENT & PLAN NOTE
Wt Readings from Last 3 Encounters:   03/17/23 113 kg (249 lb 5 4 oz)   02/20/23 97 1 kg (214 lb)   12/02/22 107 kg (236 lb 12 4 oz)     · Patient presents from [facility] with reports of hypoxia and shortness of breath  · CT chest PE study: Moderate to large right effusion   Moderate left effusion with some presacral edema and anterior abdominal wall edema, evaluate for heart failure  · Trial Lasix drip with nephrology, no significant urine output over the last 12 hours  · Currently requiring 2 to 3 L nasal cannula, sats in upper 90s  · Discussed with son at bedside, agreeable for hospice  · Hospice consulted

## 2023-03-19 NOTE — DISCHARGE SUMMARY
Franny 89  Discharge- Doc Lopez 1935, 80 y o  male MRN: [id number]  Unit/Bed#: -01 Encounter: [id number]  Primary Care Provider: Carly Caceres DO   Date and time admitted to hospital: 3/16/2023  9:04 AM    * Goals of care, counseling/discussion  Assessment & Plan  Patient with history of systolic heart failure, prior EF showing 25%, presented with worsening altered mental status  Found to have new acute right lobe PE but has been on anticoagulation  Significant pleural effusions noted on CT imaging, attempted diuresis with Lasix drip with no significant output  Further goals of care discussion had with son at bedside, agreeable for hospice  Hospice consulted    Acute on chronic systolic congestive heart failure [facility])  Assessment & Plan  Wt Readings from Last 3 Encounters:   03/17/23 113 kg (249 lb 5 4 oz)   02/20/23 97 1 kg (214 lb)   12/02/22 107 kg (236 lb 12 4 oz)     · Patient presents from [facility] with reports of hypoxia and shortness of breath  · CT chest PE study: Moderate to large right effusion  Moderate left effusion with some presacral edema and anterior abdominal wall edema, evaluate for heart failure  · Trial Lasix drip with nephrology, no significant urine output over the last 12 hours  · Currently requiring 2 to 3 L nasal cannula, sats in upper 90s  · Discussed with son at bedside, agreeable for hospice  · Hospice consulted          Encephalopathy  Assessment & Plan  · Nursing staff at [facility] reported the patient to be confused and not his self  · Patient currently appears to be at baseline per the patient's family at the bedside however they do state the patient has been declining over the last few months  · CT head negative  · Suspect patient has baseline dementia    Pulmonary embolism ([facility] [address] )  Assessment & Plan  · Patient with history of afib on eliquis   Family reports patient was previously on Coumadin but was switched to Eliquis  · CTA chest PE study: Nonocclusive the segmental pulmonary embolism in the right lower lobe  · Had been on Eliquis  · Family agreeable for hospice    Ambulatory dysfunction  Assessment & Plan  · Patient currently residing at [facility] nursing [facility] for short-term rehab after a fall  · PT/OT    Acute renal failure superimposed on stage 3a chronic kidney disease [facility])  Assessment & Plan  Lab Results   Component Value Date    EGFR 32 03/17/2023    EGFR 33 03/16/2023    EGFR 56 02/20/2023    CREATININE 1 82 (H) 03/17/2023    CREATININE 1 77 (H) 03/16/2023    CREATININE 1 16 02/20/2023     · Creatinine of 1 77 on admission, this is above patient's baseline  · Slightly worsening creatinine today compared to previous  · Appreciate nephrology input    Atrial fibrillation with RVR (HCC)  Assessment & Plan  · Heart rate slightly elevated in the low 100s  · Continue home regimen of Toprol with hold parameters given low normal blood pressures  · Cardiology consulted; no longer needed, OFF BB, transition to hospice        Medical Problems     Resolved Problems  Date Reviewed: 3/19/2023   None       Discharging Physician / Practitioner: Nora Sanchez DO  PCP: Alejandro Ontiveros DO  Admission Date:   Admission Orders (From admission, onward)     Ordered        03/16/23 1145  [facility]  Once                      Discharge Date: 03/19/23    Consultations During Hospital Stay:  Hospice, wound care, nephrology, hematology, cardiology, nutrition services    Procedures Performed:   Acute, critical care evaluation    Significant Findings / Test Results:    pleural effusions, nonocclusive PE right lower lobe    Incidental Findings:      · I reviewed the above mentioned incidental findings with the patient and/or family and they expressed understanding  Test Results Pending at Discharge (will require follow up):    · None     Outpatient Tests Requested:  · None    Complications:  None     Reason for Admission: Acute Resp Failure    Hospital Course:   Desmond Baldwin is a 80 y o  male patient who originally presented to the hospital on 3/16/2023 due to hypoxia and shortness of breath, work-up revealed pleural effusions and nonocclusive PE right lower lobe, started on treatment appropriately including oxygen and diuresis, with relevant services following, for further discussion with family, it was agreed upon hospice care, and patient to return back to his facility [facility] 3/19 as arranged  3/19 morning patient seen and examined, comfortable in the bed, sleeping, minimally responding to verbal stimuli but not communicating  In no acute pain or distress   Called and spoke with the patient's son updated on the plan of hospice and transferred to the facility today he confirmed understanding and denies any concerns or question  Please see above list of diagnoses and related plan for additional information  Condition at Discharge: poor    Discharge Day Visit / Exam:   Subjective: Limited due to patient's altered mental status and confusion; minimal communication  Vitals: Blood Pressure: 136/71 (03/19/23 0745)  Pulse: 90 (03/18/23 2231)  Temperature: 97 7 °F (36 5 °C) (03/18/23 0700)  Temp Source: Axillary (03/16/23 2106)  Respirations: 16 (03/19/23 0745)  Height: 6' (182 9 cm) (03/16/23 0926)  Weight - Scale: 113 kg (249 lb 5 4 oz) (03/17/23 0545)  SpO2: 94 % (03/18/23 2231)  Exam:   Physical Exam  Constitutional:       Appearance: He is obese  He is ill-appearing  Comments: Comfortable in bed in no apparent pain or distress   HENT:      Head: Normocephalic and atraumatic  Right Ear: External ear normal       Left Ear: External ear normal       Nose: Nose normal  No congestion or rhinorrhea  Mouth/Throat:      Mouth: Mucous membranes are dry  Pharynx: Oropharynx is clear  Eyes:      Pupils: Pupils are equal, round, and reactive to light        Comments: Eyes closed Cardiovascular:      Rate and Rhythm: Normal rate and regular rhythm  Heart sounds: Murmur heard  Pulmonary:      Breath sounds: Rhonchi and rales present  No wheezing  Abdominal:      General: There is no distension  Palpations: There is no mass  Tenderness: There is no abdominal tenderness  Musculoskeletal:      Right lower leg: Edema present  Left lower leg: Edema present  Skin:     Capillary Refill: Capillary refill takes less than 2 seconds  Neurological:      Mental Status: He is disoriented  Cranial Nerves: No cranial nerve deficit  Discussion with Family: Updated  (son) via phone  Discharge instructions/Information to patient and family:   See after visit summary for information provided to patient and family  Provisions for Follow-Up Care:  See after visit summary for information related to follow-up care and any pertinent home health orders  Disposition:   [facility]     Planned Readmission: No     Discharge Statement:  I spent 30 minutes discharging the patient  This time was spent on the day of discharge  I had direct contact with the patient on the day of discharge  Greater than 50% of the total time was spent examining patient, answering all patient questions, arranging and discussing plan of care with patient as well as directly providing post-discharge instructions  Additional time then spent on discharge activities  Discharge Medications:  See after visit summary for reconciled discharge medications provided to patient and/or family        **Please Note: This note may have been constructed using a voice recognition system**

## 2023-03-19 NOTE — PLAN OF CARE
Problem: Potential for Falls  Goal: Patient will remain free of falls  Description: INTERVENTIONS:  - Educate patient/family on patient safety including physical limitations  - Instruct patient to call for assistance with activity   - Consult OT/PT to assist with strengthening/mobility   - Keep Call bell within reach  - Keep bed low and locked with side rails adjusted as appropriate  - Keep care items and personal belongings within reach  - Initiate and maintain comfort rounds  - Make Fall Risk Sign visible to staff  - Offer Toileting every 2 Hours, in advance of need  - Initiate/Maintain bed alarm  - Obtain necessary fall risk management equipment  - Apply yellow socks and bracelet for high fall risk patients  - Consider moving patient to room near nurses station  Outcome: Adequate for Discharge     Problem: MOBILITY - ADULT  Goal: Maintain or return to baseline ADL function  Description: INTERVENTIONS:  -  Assess patient's ability to carry out ADLs; assess patient's baseline for ADL function and identify physical deficits which impact ability to perform ADLs (bathing, care of mouth/teeth, toileting, grooming, dressing, etc )  - Assess/evaluate cause of self-care deficits   - Assess range of motion  - Assess patient's mobility; develop plan if impaired  - Assess patient's need for assistive devices and provide as appropriate  - Encourage maximum independence but intervene and supervise when necessary  - Involve family in performance of ADLs  - Assess for home care needs following discharge   - Consider OT consult to assist with ADL evaluation and planning for discharge  - Provide patient education as appropriate  Outcome: Adequate for Discharge  Goal: Maintains/Returns to pre admission functional level  Description: INTERVENTIONS:  - Perform BMAT or MOVE assessment daily    - Set and communicate daily mobility goal to care team and patient/family/caregiver     - Collaborate with rehabilitation services on mobility goals if consulted  - Perform Range of Motion 2 times a day  - Reposition patient every 2 hours    - Dangle patient 2 times a day  - Stand patient 2 times a day  - Ambulate patient 2 times a day  - Out of bed to chair 3 times a day   - Out of bed for meals 3 times a day  - Out of bed for toileting  - Record patient progress and toleration of activity level   Outcome: Adequate for Discharge     Problem: PAIN - ADULT  Goal: Verbalizes/displays adequate comfort level or baseline comfort level  Description: Interventions:  - Encourage patient to monitor pain and request assistance  - Assess pain using appropriate pain scale  - Administer analgesics based on type and severity of pain and evaluate response  - Implement non-pharmacological measures as appropriate and evaluate response  - Consider cultural and social influences on pain and pain management  - Notify physician/advanced practitioner if interventions unsuccessful or patient reports new pain  Outcome: Adequate for Discharge     Problem: INFECTION - ADULT  Goal: Absence or prevention of progression during hospitalization  Description: INTERVENTIONS:  - Assess and monitor for signs and symptoms of infection  - Monitor lab/diagnostic results  - Monitor all insertion sites, i e  indwelling lines, tubes, and drains  - Monitor endotracheal if appropriate and nasal secretions for changes in amount and color  - Chapman appropriate cooling/warming therapies per order  - Administer medications as ordered  - Instruct and encourage patient and family to use good hand hygiene technique  - Identify and instruct in appropriate isolation precautions for identified infection/condition  Outcome: Adequate for Discharge     Problem: SAFETY ADULT  Goal: Patient will remain free of falls  Description: INTERVENTIONS:  - Educate patient/family on patient safety including physical limitations  - Instruct patient to call for assistance with activity   - Consult OT/PT to assist with strengthening/mobility   - Keep Call bell within reach  - Keep bed low and locked with side rails adjusted as appropriate  - Keep care items and personal belongings within reach  - Initiate and maintain comfort rounds  - Make Fall Risk Sign visible to staff  - Offer Toileting every 2 Hours, in advance of need  - Initiate/Maintain bed alarm  - Obtain necessary fall risk management equipment  - Apply yellow socks and bracelet for high fall risk patients  - Consider moving patient to room near nurses station  Outcome: Adequate for Discharge  Goal: Maintain or return to baseline ADL function  Description: INTERVENTIONS:  -  Assess patient's ability to carry out ADLs; assess patient's baseline for ADL function and identify physical deficits which impact ability to perform ADLs (bathing, care of mouth/teeth, toileting, grooming, dressing, etc )  - Assess/evaluate cause of self-care deficits   - Assess range of motion  - Assess patient's mobility; develop plan if impaired  - Assess patient's need for assistive devices and provide as appropriate  - Encourage maximum independence but intervene and supervise when necessary  - Involve family in performance of ADLs  - Assess for home care needs following discharge   - Consider OT consult to assist with ADL evaluation and planning for discharge  - Provide patient education as appropriate  Outcome: Adequate for Discharge  Goal: Maintains/Returns to pre admission functional level  Description: INTERVENTIONS:  - Perform BMAT or MOVE assessment daily    - Set and communicate daily mobility goal to care team and patient/family/caregiver  - Collaborate with rehabilitation services on mobility goals if consulted  - Perform Range of Motion 2 times a day  - Reposition patient every 2 hours    - Dangle patient 2 times a day  - Stand patient 2 times a day  - Ambulate patient 2 times a day  - Out of bed to chair 3 times a day   - Out of bed for meals 3 times a day  - Out of bed for toileting  - Record patient progress and toleration of activity level   Outcome: Adequate for Discharge     Problem: DISCHARGE PLANNING  Goal: Discharge to home or other facility with appropriate resources  Description: INTERVENTIONS:  - Identify barriers to discharge w/patient and caregiver  - Arrange for needed discharge resources and transportation as appropriate  - Identify discharge learning needs (meds, wound care, etc )  - Arrange for interpretive services to assist at discharge as needed  - Refer to Case Management Department for coordinating discharge planning if the patient needs post-hospital services based on physician/advanced practitioner order or complex needs related to functional status, cognitive ability, or social support system  Outcome: Adequate for Discharge     Problem: Knowledge Deficit  Goal: Patient/family/caregiver demonstrates understanding of disease process, treatment plan, medications, and discharge instructions  Description: Complete learning assessment and assess knowledge base    Interventions:  - Provide teaching at level of understanding  - Provide teaching via preferred learning methods  Outcome: Adequate for Discharge     Problem: Prexisting or High Potential for Compromised Skin Integrity  Goal: Skin integrity is maintained or improved  Description: INTERVENTIONS:  - Identify patients at risk for skin breakdown  - Assess and monitor skin integrity  - Assess and monitor nutrition and hydration status  - Monitor labs   - Assess for incontinence   - Turn and reposition patient  - Assist with mobility/ambulation  - Relieve pressure over bony prominences  - Avoid friction and shearing  - Provide appropriate hygiene as needed including keeping skin clean and dry  - Evaluate need for skin moisturizer/barrier cream  - Collaborate with interdisciplinary team   - Patient/family teaching  - Consider wound care consult   Outcome: Adequate for Discharge     Problem: NEUROSENSORY - ADULT  Goal: Achieves stable or improved neurological status  Description: INTERVENTIONS  - Monitor and report changes in neurological status  - Monitor vital signs such as temperature, blood pressure, glucose, and any other labs ordered   - Initiate measures to prevent increased intracranial pressure  - Monitor for seizure activity and implement precautions if appropriate      Outcome: Adequate for Discharge     Problem: CARDIOVASCULAR - ADULT  Goal: Maintains optimal cardiac output and hemodynamic stability  Description: INTERVENTIONS:  - Monitor I/O, vital signs and rhythm  - Monitor for S/S and trends of decreased cardiac output  - Administer and titrate ordered vasoactive medications to optimize hemodynamic stability  - Assess quality of pulses, skin color and temperature  - Assess for signs of decreased coronary artery perfusion  - Instruct patient to report change in severity of symptoms  Outcome: Adequate for Discharge     Problem: GENITOURINARY - ADULT  Goal: Maintains or returns to baseline urinary function  Description: INTERVENTIONS:  - Assess urinary function  - Encourage oral fluids to ensure adequate hydration if ordered  - Administer IV fluids as ordered to ensure adequate hydration  - Administer ordered medications as needed  - Offer frequent toileting  - Follow urinary retention protocol if ordered  Outcome: Adequate for Discharge  Goal: Absence of urinary retention  Description: INTERVENTIONS:  - Assess patient’s ability to void and empty bladder  - Monitor I/O  - Bladder scan as needed  - Discuss with physician/AP medications to alleviate retention as needed  - Discuss catheterization for long term situations as appropriate  Outcome: Adequate for Discharge     Problem: METABOLIC, FLUID AND ELECTROLYTES - ADULT  Goal: Electrolytes maintained within normal limits  Description: INTERVENTIONS:  - Monitor labs and assess patient for signs and symptoms of electrolyte imbalances  - Administer electrolyte replacement as ordered  - Monitor response to electrolyte replacements, including repeat lab results as appropriate  - Instruct patient on fluid and nutrition as appropriate  Outcome: Adequate for Discharge  Goal: Fluid balance maintained  Description: INTERVENTIONS:  - Monitor labs   - Monitor I/O and WT  - Instruct patient on fluid and nutrition as appropriate  - Assess for signs & symptoms of volume excess or deficit  Outcome: Adequate for Discharge     Problem: SKIN/TISSUE INTEGRITY - ADULT  Goal: Skin Integrity remains intact(Skin Breakdown Prevention)  Description: Assess:  -Skin Care:  -Avoid use of baby powder, tape, friction and shearing, hot water or constrictive clothing  -Relieve pressure over bony prominences using pillows  -Do not massage red bony areas

## 2023-03-19 NOTE — INCIDENTAL FINDINGS
The following findings require follow up:  Radiographic finding   Finding:     CTA PE      Nonocclusive the segmental pulmonary embolism in the right lower lobe segmental pulmonary artery branch, image 152 series 2  Measured RV/LV ratio is within normal limits at less than 0 9      Moderate to large right effusion  Moderate left effusion with some presacral edema and anterior abdominal wall edema, evaluate for heart failure     Cholelithiasis     Enhancing area seen within the liver parenchyma, incompletely characterized probably due to vascular shunt however nonemergent evaluation with the MRI suggested for definite extension to exclude any underlying lesion      Incidentally noted is a 1 5 cm cystic pancreatic lesion which can also be evaluated at the time of the MRI          Follow up required: no ; Hospice    Follow up should be done within  N/A

## 2023-03-19 NOTE — NURSING NOTE
IV site removed  Discharge instructions given to transport team, report called to [facility]    Patient discharged via ambulance

## 2023-03-29 ENCOUNTER — HOME CARE VISIT (OUTPATIENT)
Dept: HOME HOSPICE | Facility: HOSPICE | Age: 88
End: 2023-03-29